# Patient Record
Sex: FEMALE | Race: WHITE | Employment: UNEMPLOYED | URBAN - METROPOLITAN AREA
[De-identification: names, ages, dates, MRNs, and addresses within clinical notes are randomized per-mention and may not be internally consistent; named-entity substitution may affect disease eponyms.]

---

## 2017-05-15 RX ORDER — INSULIN GLARGINE 100 [IU]/ML
INJECTION, SOLUTION SUBCUTANEOUS
Qty: 15 ML | Refills: 4 | Status: SHIPPED | OUTPATIENT
Start: 2017-05-15 | End: 2017-12-01 | Stop reason: SDUPTHER

## 2017-05-15 RX ORDER — CLOBETASOL PROPIONATE 0.5 MG/G
OINTMENT TOPICAL
Qty: 15 G | Refills: 0 | Status: SHIPPED | OUTPATIENT
Start: 2017-05-15 | End: 2017-06-16 | Stop reason: SDUPTHER

## 2017-06-02 ENCOUNTER — OFFICE VISIT (OUTPATIENT)
Dept: ENDOCRINOLOGY | Age: 47
End: 2017-06-02

## 2017-06-02 VITALS
RESPIRATION RATE: 18 BRPM | HEART RATE: 94 BPM | WEIGHT: 264.9 LBS | OXYGEN SATURATION: 96 % | SYSTOLIC BLOOD PRESSURE: 118 MMHG | HEIGHT: 64 IN | BODY MASS INDEX: 45.23 KG/M2 | TEMPERATURE: 98.8 F | DIASTOLIC BLOOD PRESSURE: 92 MMHG

## 2017-06-02 DIAGNOSIS — Z79.4 TYPE 2 DIABETES MELLITUS WITH HYPERGLYCEMIA, WITH LONG-TERM CURRENT USE OF INSULIN (HCC): Primary | ICD-10-CM

## 2017-06-02 DIAGNOSIS — E78.2 MIXED HYPERLIPIDEMIA: ICD-10-CM

## 2017-06-02 DIAGNOSIS — E66.01 MORBID OBESITY DUE TO EXCESS CALORIES (HCC): ICD-10-CM

## 2017-06-02 DIAGNOSIS — E11.65 TYPE 2 DIABETES MELLITUS WITH HYPERGLYCEMIA, WITH LONG-TERM CURRENT USE OF INSULIN (HCC): Primary | ICD-10-CM

## 2017-06-02 NOTE — PROGRESS NOTES
[facility]               Avis Arvizu MD        [address] 78 [phone] Fax [phone]           Patient Information  Date:6/2/2017  Name : Jeff Gil 48 y.o.     YOB: 1970         Referred by: Self referred        Chief Complaint   Patient presents with    Diabetes       History of Present Illness: Jeff Gil is a 48 y.o. female here for follow up     She is eating healthy now as she is fasting during Ramadan. Until then, her diet was not very healthy. No recent labs. She did not bring the log and she says she is very busy and has no time to check blood glucose. She is sleeping better. She is on Metformin and self stopped Lantus as she is fasting. Self stopped [facility]       She was diagnosed with type 2 diabetes several years ago  She is a hospice nurse by profession, knows about all complications and also the nutritional facts but it is just that she has difficulty following it. She has a family history of type 2 diabetes. Wt Readings from Last 3 Encounters:   06/02/17 264 lb 14.4 oz (120.2 kg)   12/21/16 264 lb (119.7 kg)   09/14/16 268 lb (121.6 kg)       BP Readings from Last 3 Encounters:   06/02/17 (!) 118/92   12/21/16 120/77   09/14/16 138/84           Past Medical History:   Diagnosis Date    Hyperlipidemia     Obesity     Type II or unspecified type diabetes mellitus without mention of complication, uncontrolled      Current Outpatient Prescriptions   Medication Sig    APPLE CIDER VINEGAR PO Take  by mouth two (2) times a day.  TURMERIC, BULK, by Does Not Apply route.  LANTUS SOLOSTAR 100 unit/mL (3 mL) pen INJECT 25 UNITS EVERY DAY AT 9PM AS DIRECTED    clobetasol (TEMOVATE) 0.05 % ointment APPLY TO AFFECTED AREA AS DIRECTED    hydrOXYzine HCl (ATARAX) 25 mg tablet TAKE 1 TABLET BY MOUTH EVERY EIGHT (8) HOURS AS NEEDED FOR ITCHING.     metFORMIN (GLUCOPHAGE) 1,000 mg tablet TAKE 1 TABLET BY MOUTH TWICE A DAY WITH MEALS    Blood-Glucose Meter (TRUE METRIX AIR GLUCOSE METER) monitoring kit Test blood glucose once daily Dx Code: E11.65    glucose blood VI test strips (TRUE METRIX GLUCOSE TEST STRIP) strip TEST BLOOD GLUCOSE ONCE DAILY DX CODE: E11.65    lancets (TRUEPLUS LANCETS) 33 gauge misc Test blood glucose once daily Dx Code: E11.65    lovastatin (MEVACOR) 20 mg tablet     Insulin Needles, Disposable, (ESVIN PEN NEEDLE) 32 gauge x 5/32\" ndle USE TO INJECT LANTUS    co-enzyme Q-10 (CO Q-10) 100 mg capsule Take 100 mg by mouth daily.  OTHER Take 4 Tabs by mouth daily. Bio Strath-     GREEN TEA LEAF EXTRACT (GREEN TEA PO) Take  by mouth daily.  GARLIC PO Take  by mouth daily.  cinnamon bark (CINNAMON) 500 mg cap Take  by mouth.  cholecalciferol, VITAMIN D3, (VITAMIN D3) 5,000 unit tab tablet Take  by mouth daily.  omega-3 fatty acids-vitamin e (FISH OIL) 1,000 mg cap Take 1 Cap by mouth. No current facility-administered medications for this visit. Allergies   Allergen Reactions    Other Medication Swelling     Antibiotics         Review of Systems:  - Constitutional Symptoms: no fevers, no chills   - Eyes: no blurry vision no double vision  - Cardiovascular: no chest pain ,no palpitations  - Respiratory: no cough no shortness of breath  - Integumentary: no rashes  - Neurological: no numbness, tingling, no  headaches  -     Physical Examination:   Blood pressure (!) 118/92, pulse 94, temperature 98.8 °F (37.1 °C), temperature source Oral, resp. rate 18, height 5' 4\" (1.626 m), weight 264 lb 14.4 oz (120.2 kg), SpO2 96 %. Estimated body mass index is 45.47 kg/(m^2) as calculated from the following:    Height as of this encounter: 5' 4\" (1.626 m). -   Weight as of this encounter: 264 lb 14.4 oz (120.2 kg).   - General: pleasant, no distress, good eye contact  - HEENT: no pallor, no periorbital edema, EOMI  - Neck: supple, no thyromegaly  - Cardiovascular: regular, normal rate, normal S1 and S2  - Respiratory: clear to auscultation bilaterally  - Gastrointestinal: soft, nontender, nondistended,  BS +  - Musculoskeletal: no edema, no foot ulcers  Diabetic feet exam ;     H/o partial or complete amputation of foot : No  H/o previous foot ulceration : No  H/o pre - ulcerative callus : No  H/o peripheral neuropathy and callus : No  H/o poor circulation  : No  Foot deformity : No  -   - Neurological:alert and oriented  - Psychiatric: normal mood and affect  - Skin: color, texture, turgor normal.       Data Reviewed:     [] Glucose records reviewed. [] See glucose records for details (to be scanned). [] A1C  [x] Reviewed labs      Lab Results   Component Value Date/Time    Hemoglobin A1c 10.3 08/31/2016 01:53 PM    Hemoglobin A1c 8.8 06/17/2015 04:00 PM    Glucose 350 08/31/2016 01:53 PM    Glucose  08/10/2015 01:03 PM    Microalb/Creat ratio (ug/mg creat.) 7.3 08/31/2016 01:53 PM    LDL, calculated 92 08/31/2016 01:53 PM    Creatinine 0.65 08/31/2016 01:53 PM      Lab Results   Component Value Date/Time    Cholesterol, total 177 08/31/2016 01:53 PM    HDL Cholesterol 56 08/31/2016 01:53 PM    LDL, calculated 92 08/31/2016 01:53 PM    Triglyceride 147 08/31/2016 01:53 PM     Lab Results   Component Value Date/Time    ALT (SGPT) 33 08/31/2016 01:53 PM    AST (SGOT) 61 08/31/2016 01:53 PM    Alk. phosphatase 85 08/31/2016 01:53 PM    Bilirubin, total <0.2 08/31/2016 01:53 PM     Lab Results   Component Value Date/Time    GFR est  08/31/2016 01:53 PM    GFR est non- 08/31/2016 01:53 PM    Creatinine 0.65 08/31/2016 01:53 PM    BUN 10 08/31/2016 01:53 PM    Sodium 134 08/31/2016 01:53 PM    Potassium 4.2 08/31/2016 01:53 PM    Chloride 95 08/31/2016 01:53 PM    CO2 25 08/31/2016 01:53 PM      Lab Results   Component Value Date/Time    TSH 2.460 08/31/2016 01:53 PM           Assessment/Plan:     1. Type 2 Diabetes Mellitus , uncontrolled.   Lab Results Component Value Date/Time    Hemoglobin A1c 10.3 08/31/2016 01:53 PM    Hemoglobin A1c (POC) 7.8 08/10/2015 01:05 PM    Need data  Lantus 26 units Continue metformin 1000 mg BID  Januvia 100 mg in AM - self stopped   Discussed the importance of checking home glucose regularly and taking all of their scheduled medications in order to have the best possible outcome. Reviewed the complications and importance of diet. Discussed about the diet, carbohydrate portion control and increase activity as tolerated. FLU annually ,Pneumovax ,aspirin daily,annual eye exam,microalbumin    2. Obesity:Body mass index is 45.47 kg/(m^2). BMI is out of normal parameters and plan is as follows: I have counseled this patient on diet and exercise regimens. 3. Hyperlipidemia: statin             There are no Patient Instructions on file for this visit. Follow-up Disposition: Not on File    Thank you for allowing me to participate in the care of this patient.     Rajan Lugo MD

## 2017-06-02 NOTE — PROGRESS NOTES
Nathanael Haynes is a 48 y.o. female here for   Chief Complaint   Patient presents with    Diabetes       Functional glucose monitor and record keeping system? - yes  Eye exam within last year? - yes Mar 2017  Foot exam within last year? - no    Lab Results   Component Value Date/Time    Hemoglobin A1c 10.3 08/31/2016 01:53 PM    Hemoglobin A1c (POC) 7.8 08/10/2015 01:05 PM       Wt Readings from Last 3 Encounters:   12/21/16 264 lb (119.7 kg)   09/14/16 268 lb (121.6 kg)   04/06/16 259 lb (117.5 kg)     Temp Readings from Last 3 Encounters:   12/21/16 97.7 °F (36.5 °C) (Oral)   09/14/16 98.3 °F (36.8 °C)   04/06/16 97.3 °F (36.3 °C) (Oral)     BP Readings from Last 3 Encounters:   12/21/16 120/77   09/14/16 138/84   04/06/16 127/74     Pulse Readings from Last 3 Encounters:   12/21/16 85   09/14/16 92   04/06/16 91

## 2017-06-02 NOTE — MR AVS SNAPSHOT
Visit Information Date & Time Provider Department Dept. Phone Encounter #  
 6/2/2017  3:45 PM Haja Gross MD [facility] Diabetes [facility] Endocr[facility]ology [phone] [id number] Follow-up Instructions Return in about 3 months (around 9/2/2017). Upcoming Health Maintenance Date Due  
 FOOT EXAM Q1 10/16/1978 EYE EXAM RETINAL OR DILATED Q1 10/16/1978 Pneumococcal 19-64 Medium Risk (1 of 1 - PPSV23) 10/16/1987 DTaP/Tdap/Td series (1 - Tdap) 10/16/1989 PAP AKA CERVICAL CYTOLOGY 10/16/1989 HEMOGLOBIN A1C Q6M 2/28/2017 INFLUENZA AGE 9 TO ADULT 8/1/2017 MICROALBUMIN Q1 8/31/2017 LIPID PANEL Q1 8/31/2017 Allergies as of 6/2/2017  Review Complete On: 6/2/2017 By: Haja Gross MD  
  
 Severity Noted Reaction Type Reactions Other Medication  12/08/2014    Swelling Antibiotics Current Immunizations  Never Reviewed No immunizations on file. Not reviewed this visit You Were Diagnosed With   
  
 Codes Comments Type 2 diabetes mellitus with hyperglycemia, with long-term current use of insulin (HCC)    -  Primary ICD-10-CM: E11.65, Z79.4 ICD-9-CM: 250.00, 790.29, V58.67 Mixed hyperlipidemia     ICD-10-CM: E78.2 ICD-9-CM: 272.2 Vitals BP Pulse Temp Resp Height(growth percentile) Weight(growth percentile) (!) 118/92 (BP 1 Location: Left arm, BP Patient Position: Sitting) 94 98.8 °F (37.1 °C) (Oral) 18 5' 4\" (1.626 m) 264 lb 14.4 oz (120.2 kg) SpO2 BMI OB Status Smoking Status 96% 45.47 kg/m2 Having regular periods Never Smoker Vitals History BMI and BSA Data Body Mass Index Body Surface Area 45.47 kg/m 2 2.33 m 2 Preferred Pharmacy Pharmacy Name Phone [id number], [address] [facility] [place] [phone] Your Updated Medication List  
  
   
This list is accurate as of: 6/2/17  4:41 PM.  Always use your most recent med list.  
  
  
  
  
 APPLE CIDER VINEGAR PO  
 Take  by mouth two (2) times a day. Blood-Glucose Meter monitoring kit Commonly known as:  TRUE METRIX AIR GLUCOSE METER Test blood glucose once daily Dx Code: E11.65  
  
 cholecalciferol (VITAMIN D3) 5,000 unit Tab tablet Commonly known as:  VITAMIN D3 Take  by mouth daily. CINNAMON 500 mg Cap Generic drug:  cinnamon bark Take  by mouth.  
  
 clobetasol 0.05 % ointment Commonly known as:  TEMOVATE  
APPLY TO AFFECTED AREA AS DIRECTED  
  
 CO Q-10 100 mg capsule Generic drug:  co-enzyme Q-10 Take 100 mg by mouth daily. FISH OIL 1,000 mg Cap Generic drug:  omega-3 fatty acids-vitamin e Take 1 Cap by mouth. GARLIC PO Take  by mouth daily. glucose blood VI test strips strip Commonly known as:  TRUE METRIX GLUCOSE TEST STRIP  
TEST BLOOD GLUCOSE ONCE DAILY DX CODE: E11.65 GREEN TEA PO Take  by mouth daily. hydrOXYzine HCl 25 mg tablet Commonly known as:  ATARAX TAKE 1 TABLET BY MOUTH EVERY EIGHT (8) HOURS AS NEEDED FOR ITCHING. Insulin Needles (Disposable) 32 gauge x 5/32\" Ndle Commonly known as:  Zenaida Pen Needle USE TO INJECT LANTUS  
  
 lancets 33 gauge Misc Commonly known as:  Pari Tang Test blood glucose once daily Dx Code: E11.65  
  
 LANTUS SOLOSTAR 100 unit/mL (3 mL) Inpn Generic drug:  insulin glargine INJECT 25 UNITS EVERY DAY AT 9PM AS DIRECTED  
  
 lovastatin 20 mg tablet Commonly known as:  MEVACOR  
  
 metFORMIN 1,000 mg tablet Commonly known as:  GLUCOPHAGE  
TAKE 1 TABLET BY MOUTH TWICE A DAY WITH MEALS  
  
 OTHER Take 4 Tabs by mouth daily. Bio Strath-  
  
 TURMERIC (BULK)  
by Does Not Apply route. Follow-up Instructions Return in about 3 months (around 9/2/2017). Introducing [facility]! Sonido Reece introduces [employer] patient portal. Now you can access parts of your medical record, email your doctor's office, and request medication refills online. 1. In your internet browser, go to [email or web address] 2. Click on the First Time User? Click Here link in the Sign In box. You will see the New Member Sign Up page. 3. Enter your [employer] Access Code exactly as it appears below. You will not need to use this code after youve completed the sign-up process. If you do not sign up before the expiration date, you must request a new code. · [employer] Access Code: [email or web address] Expires: 8/31/2017  4:41 PM 
 
4. Enter the last four digits of your Social Security Number (xxxx) and Date of Birth (mm/dd/yyyy) as indicated and click Submit. You will be taken to the next sign-up page. 5. Create a [employer] ID. This will be your [employer] login ID and cannot be changed, so think of one that is secure and easy to remember. 6. Create a [employer] password. You can change your password at any time. 7. Enter your Password Reset Question and Answer. This can be used at a later time if you forget your password. 8. Enter your e-mail address. You will receive e-mail notification when new information is available in [address] 9. Click Sign Up. You can now view and download portions of your medical record. 10. Click the Download Summary menu link to download a portable copy of your medical information. If you have questions, please visit the Frequently Asked Questions section of the [employer] website. Remember, [employer] is NOT to be used for urgent needs. For medical emergencies, dial 911. Now available from your iPhone and Android! Please provide this summary of care documentation to your next provider. Your primary care clinician is listed as Cozetta Barthel. If you have any questions after today's visit, please call [phone].

## 2017-06-15 ENCOUNTER — LAB ONLY (OUTPATIENT)
Dept: ENDOCRINOLOGY | Age: 47
End: 2017-06-15

## 2017-06-15 DIAGNOSIS — Z79.4 TYPE 2 DIABETES MELLITUS WITH HYPERGLYCEMIA, WITH LONG-TERM CURRENT USE OF INSULIN (HCC): ICD-10-CM

## 2017-06-15 DIAGNOSIS — E11.65 TYPE 2 DIABETES MELLITUS WITH HYPERGLYCEMIA, WITH LONG-TERM CURRENT USE OF INSULIN (HCC): ICD-10-CM

## 2017-06-15 DIAGNOSIS — E78.2 MIXED HYPERLIPIDEMIA: ICD-10-CM

## 2017-06-16 LAB
ALBUMIN SERPL-MCNC: 4.1 G/DL (ref 3.5–5.5)
ALBUMIN/CREAT UR: 11.7 MG/G CREAT (ref 0–30)
ALBUMIN/GLOB SERPL: 1.3 {RATIO} (ref 1.2–2.2)
ALP SERPL-CCNC: 70 IU/L (ref 39–117)
ALT SERPL-CCNC: 24 IU/L (ref 0–32)
AST SERPL-CCNC: 36 IU/L (ref 0–40)
BILIRUB SERPL-MCNC: 0.3 MG/DL (ref 0–1.2)
BUN SERPL-MCNC: 9 MG/DL (ref 6–24)
BUN/CREAT SERPL: 16 (ref 9–23)
CALCIUM SERPL-MCNC: 9.1 MG/DL (ref 8.7–10.2)
CHLORIDE SERPL-SCNC: 98 MMOL/L (ref 96–106)
CHOLEST SERPL-MCNC: 188 MG/DL (ref 100–199)
CO2 SERPL-SCNC: 22 MMOL/L (ref 18–29)
CREAT SERPL-MCNC: 0.55 MG/DL (ref 0.57–1)
CREAT UR-MCNC: 185.1 MG/DL
EST. AVERAGE GLUCOSE BLD GHB EST-MCNC: 237 MG/DL
GLOBULIN SER CALC-MCNC: 3.1 G/DL (ref 1.5–4.5)
GLUCOSE SERPL-MCNC: 214 MG/DL (ref 65–99)
HBA1C MFR BLD: 9.9 % (ref 4.8–5.6)
HDLC SERPL-MCNC: 60 MG/DL
INTERPRETATION, 910389: NORMAL
LDLC SERPL CALC-MCNC: 103 MG/DL (ref 0–99)
Lab: NORMAL
MICROALBUMIN UR-MCNC: 21.7 UG/ML
POTASSIUM SERPL-SCNC: 4.2 MMOL/L (ref 3.5–5.2)
PROT SERPL-MCNC: 7.2 G/DL (ref 6–8.5)
SODIUM SERPL-SCNC: 137 MMOL/L (ref 134–144)
TRIGL SERPL-MCNC: 126 MG/DL (ref 0–149)
VLDLC SERPL CALC-MCNC: 25 MG/DL (ref 5–40)

## 2017-06-19 RX ORDER — CLOBETASOL PROPIONATE 0.5 MG/G
OINTMENT TOPICAL
Qty: 15 G | Refills: 0 | Status: SHIPPED | OUTPATIENT
Start: 2017-06-19 | End: 2017-10-16 | Stop reason: SDUPTHER

## 2017-09-17 DIAGNOSIS — Z79.4 UNCONTROLLED TYPE 2 DIABETES MELLITUS WITH HYPERGLYCEMIA, WITH LONG-TERM CURRENT USE OF INSULIN (HCC): ICD-10-CM

## 2017-09-17 DIAGNOSIS — E11.65 UNCONTROLLED TYPE 2 DIABETES MELLITUS WITH HYPERGLYCEMIA, WITH LONG-TERM CURRENT USE OF INSULIN (HCC): ICD-10-CM

## 2017-09-17 RX ORDER — METFORMIN HYDROCHLORIDE 1000 MG/1
TABLET ORAL
Qty: 60 TAB | Refills: 8 | Status: SHIPPED | OUTPATIENT
Start: 2017-09-17 | End: 2017-12-01 | Stop reason: SDUPTHER

## 2017-10-16 DIAGNOSIS — E11.65 UNCONTROLLED TYPE 2 DIABETES MELLITUS WITH HYPERGLYCEMIA, WITH LONG-TERM CURRENT USE OF INSULIN (HCC): ICD-10-CM

## 2017-10-16 DIAGNOSIS — Z79.4 UNCONTROLLED TYPE 2 DIABETES MELLITUS WITH HYPERGLYCEMIA, WITH LONG-TERM CURRENT USE OF INSULIN (HCC): ICD-10-CM

## 2017-10-16 RX ORDER — METFORMIN HYDROCHLORIDE 1000 MG/1
TABLET ORAL
Qty: 60 TAB | Refills: 8 | Status: SHIPPED | OUTPATIENT
Start: 2017-10-16 | End: 2017-12-01 | Stop reason: SDUPTHER

## 2017-10-16 RX ORDER — CLOBETASOL PROPIONATE 0.5 MG/G
OINTMENT TOPICAL
Qty: 15 G | Refills: 0 | Status: SHIPPED | OUTPATIENT
Start: 2017-10-16 | End: 2017-12-01 | Stop reason: SDUPTHER

## 2017-12-01 ENCOUNTER — OFFICE VISIT (OUTPATIENT)
Dept: ENDOCRINOLOGY | Age: 47
End: 2017-12-01

## 2017-12-01 VITALS
HEART RATE: 85 BPM | DIASTOLIC BLOOD PRESSURE: 74 MMHG | OXYGEN SATURATION: 98 % | RESPIRATION RATE: 16 BRPM | BODY MASS INDEX: 43.5 KG/M2 | HEIGHT: 64 IN | SYSTOLIC BLOOD PRESSURE: 123 MMHG | TEMPERATURE: 98.5 F | WEIGHT: 254.8 LBS

## 2017-12-01 DIAGNOSIS — E66.01 MORBID OBESITY (HCC): ICD-10-CM

## 2017-12-01 DIAGNOSIS — E11.65 TYPE 2 DIABETES MELLITUS WITH HYPERGLYCEMIA, WITHOUT LONG-TERM CURRENT USE OF INSULIN (HCC): Primary | ICD-10-CM

## 2017-12-01 DIAGNOSIS — Z79.4 UNCONTROLLED TYPE 2 DIABETES MELLITUS WITH HYPERGLYCEMIA, WITH LONG-TERM CURRENT USE OF INSULIN (HCC): ICD-10-CM

## 2017-12-01 DIAGNOSIS — E11.65 UNCONTROLLED TYPE 2 DIABETES MELLITUS WITH HYPERGLYCEMIA, WITH LONG-TERM CURRENT USE OF INSULIN (HCC): ICD-10-CM

## 2017-12-01 DIAGNOSIS — E78.2 MIXED HYPERLIPIDEMIA: ICD-10-CM

## 2017-12-01 LAB
GLUCOSE POC: 280 MG/DL
HBA1C MFR BLD HPLC: 11.3 %

## 2017-12-01 RX ORDER — METFORMIN HYDROCHLORIDE 1000 MG/1
TABLET ORAL
Qty: 180 TAB | Refills: 3 | Status: SHIPPED | OUTPATIENT
Start: 2017-12-01 | End: 2018-10-19 | Stop reason: SDUPTHER

## 2017-12-01 RX ORDER — INSULIN GLARGINE 100 [IU]/ML
INJECTION, SOLUTION SUBCUTANEOUS
Qty: 30 ML | Refills: 3 | Status: SHIPPED | OUTPATIENT
Start: 2017-12-01 | End: 2017-12-20 | Stop reason: ALTCHOICE

## 2017-12-01 RX ORDER — HYDROXYZINE 25 MG/1
TABLET, FILM COATED ORAL
Qty: 90 TAB | Refills: 3 | Status: SHIPPED | OUTPATIENT
Start: 2017-12-01 | End: 2018-04-10 | Stop reason: SDUPTHER

## 2017-12-01 RX ORDER — CLOBETASOL PROPIONATE 0.5 MG/G
OINTMENT TOPICAL
Qty: 15 G | Refills: 0 | Status: SHIPPED | OUTPATIENT
Start: 2017-12-01 | End: 2018-01-14 | Stop reason: SDUPTHER

## 2017-12-01 NOTE — PROGRESS NOTES
João Huerta is a 50 y.o. female here for   Chief Complaint   Patient presents with    Diabetes       Functional glucose monitor and record keeping system? - yes  Eye exam within last year? - march 2017  Foot exam within last year? - last visit    1. Have you been to the ER, urgent care clinic since your last visit? Hospitalized since your last visit? -no    2. Have you seen or consulted any other health care providers outside of the [address] [place] [address] since your last visit? Include any pap smears or colon screening. -PCP      Lab Results   Component Value Date/Time    Hemoglobin A1c 9.9 06/15/2017 03:35 PM    Hemoglobin A1c (POC) 7.8 08/10/2015 01:05 PM       Wt Readings from Last 3 Encounters:   06/02/17 264 lb 14.4 oz (120.2 kg)   12/21/16 264 lb (119.7 kg)   09/14/16 268 lb (121.6 kg)     Temp Readings from Last 3 Encounters:   06/02/17 98.8 °F (37.1 °C) (Oral)   12/21/16 97.7 °F (36.5 °C) (Oral)   09/14/16 98.3 °F (36.8 °C)     BP Readings from Last 3 Encounters:   06/02/17 (!) 118/92   12/21/16 120/77   09/14/16 138/84     Pulse Readings from Last 3 Encounters:   06/02/17 94   12/21/16 85   09/14/16 92

## 2017-12-01 NOTE — MR AVS SNAPSHOT
Visit Information Date & Time Provider Department Dept. Phone Encounter #  
 12/1/2017  3:45 PM Rafael Loomis MD [facility] Diabetes [facility] Endocrinology [phone] [id number] Follow-up Instructions Return in about 4 months (around 4/1/2018). Upcoming Health Maintenance Date Due  
 FOOT EXAM Q1 10/16/1978 EYE EXAM RETINAL OR DILATED Q1 10/16/1978 Pneumococcal 19-64 Medium Risk (1 of 1 - PPSV23) 10/16/1987 DTaP/Tdap/Td series (1 - Tdap) 10/16/1989 PAP AKA CERVICAL CYTOLOGY 10/16/1989 Influenza Age 5 to Adult 8/1/2017 HEMOGLOBIN A1C Q6M 12/15/2017 MICROALBUMIN Q1 6/15/2018 LIPID PANEL Q1 6/15/2018 Allergies as of 12/1/2017  Review Complete On: 12/1/2017 By: Rafael Loomis MD  
  
 Severity Noted Reaction Type Reactions Other Medication  12/08/2014    Swelling Antibiotics Current Immunizations  Never Reviewed No immunizations on file. Not reviewed this visit You Were Diagnosed With   
  
 Codes Comments Type 2 diabetes mellitus with hyperglycemia, without long-term current use of insulin (HCC)    -  Primary ICD-10-CM: E11.65 ICD-9-CM: 250.00, 790.29 Mixed hyperlipidemia     ICD-10-CM: E78.2 ICD-9-CM: 272.2 Vitals BP Pulse Temp Resp Height(growth percentile) Weight(growth percentile) 123/74 (BP 1 Location: Left arm, BP Patient Position: Sitting) 85 98.5 °F (36.9 °C) (Oral) 16 5' 4\" (1.626 m) 254 lb 12.8 oz (115.6 kg) SpO2 BMI OB Status Smoking Status 98% 43.74 kg/m2 Having regular periods Never Smoker Vitals History BMI and BSA Data Body Mass Index Body Surface Area 43.74 kg/m 2 2.28 m 2 Preferred Pharmacy Pharmacy Name Phone [id number], [address] [phone] Your Updated Medication List  
  
   
This list is accurate as of: 12/1/17  4:31 PM.  Always use your most recent med list.  
  
  
  
  
 APPLE CIDER VINEGAR PO  
 Take  by mouth two (2) times a day. Blood-Glucose Meter monitoring kit Commonly known as:  TRUE METRIX AIR GLUCOSE METER Test blood glucose once daily Dx Code: E11.65  
  
 cholecalciferol (VITAMIN D3) 5,000 unit Tab tablet Commonly known as:  VITAMIN D3 Take  by mouth daily. CINNAMON 500 mg Cap Generic drug:  cinnamon bark Take  by mouth.  
  
 clobetasol 0.05 % ointment Commonly known as:  TEMOVATE  
APPLY TO THE AFFECTED AREAS AS DIRECTED  
  
 CO Q-10 100 mg capsule Generic drug:  co-enzyme Q-10 Take 100 mg by mouth daily. FISH OIL 1,000 mg Cap Generic drug:  omega-3 fatty acids-vitamin e Take 1 Cap by mouth. GARLIC PO Take  by mouth daily. glucose blood VI test strips strip Commonly known as:  TRUE METRIX GLUCOSE TEST STRIP  
TEST BLOOD GLUCOSE ONCE DAILY DX CODE: E11.65 GREEN TEA PO Take  by mouth daily. hydrOXYzine HCl 25 mg tablet Commonly known as:  ATARAX TAKE 1 TABLET BY MOUTH EVERY EIGHT (8) HOURS AS NEEDED FOR ITCHING. Insulin Needles (Disposable) 32 gauge x 5/32\" Ndle Commonly known as:  Zenaida Pen Needle USE TO INJECT LANTUS  
  
 lancets 33 gauge Misc Commonly known as:  Jakub Hutching Test blood glucose once daily Dx Code: E11.65  
  
 LANTUS SOLOSTAR 100 unit/mL (3 mL) Inpn Generic drug:  insulin glargine INJECT 25 UNITS EVERY DAY AT 9PM AS DIRECTED  
  
 lovastatin 20 mg tablet Commonly known as:  MEVACOR  
  
 metFORMIN 1,000 mg tablet Commonly known as:  GLUCOPHAGE  
TAKE 1 TABLET BY MOUTH TWICE A DAY WITH MEALS  
  
 OTHER Take 4 Tabs by mouth daily. Bio Strath-  
  
 TURMERIC (BULK)  
by Does Not Apply route. We Performed the Following AMB POC GLUCOSE, QUANTITATIVE, BLOOD [13593 CPT(R)] AMB POC HEMOGLOBIN A1C [39264 CPT(R)] Follow-up Instructions Return in about 4 months (around 4/1/2018). Patient Instructions Lantus 26 units at night Introducing [facility]! Jose Alfredo New introduces [facility] patient portal. Now you can access parts of your medical record, email your doctor's office, and request medication refills online. 1. In your internet browser, go to [email or web address] 2. Click on the First Time User? Click Here link in the Sign In box. You will see the New Member Sign Up page. 3. Enter your [facility] Access Code exactly as it appears below. You will not need to use this code after youve completed the sign-up process. If you do not sign up before the expiration date, you must request a new code. · [facility] Access Code: [email or web address] Expires: 3/1/2018  4:31 PM 
 
4. Enter the last four digits of your Social Security Number (xxxx) and Date of Birth (mm/dd/yyyy) as indicated and click Submit. You will be taken to the next sign-up page. 5. Create a [facility] ID. This will be your [facility] login ID and cannot be changed, so think of one that is secure and easy to remember. 6. Create a [facility] password. You can change your password at any time. 7. Enter your Password Reset Question and Answer. This can be used at a later time if you forget your password. 8. Enter your e-mail address. You will receive e-mail notification when new information is available in [address] 9. Click Sign Up. You can now view and download portions of your medical record. 10. Click the Download Summary menu link to download a portable copy of your medical information. If you have questions, please visit the Frequently Asked Questions section of the [facility] website. Remember, [facility] is NOT to be used for urgent needs. For medical emergencies, dial 911. Now available from your iPhone and Android! Please provide this summary of care documentation to your next provider. Your primary care clinician is listed as Elizabeth Dunaway. If you have any questions after today's visit, please call [phone].

## 2017-12-01 NOTE — PROGRESS NOTES
[facility]               Nidia Joseph MD        [address] 78 [phone] Fax [phone]           Patient Information  Date:12/1/2017  Name : Krista Up 50 y.o.     YOB: 1970         Referred by: Self referred        Chief Complaint   Patient presents with    Diabetes       History of Present Illness: Krista Up is a 50 y.o. female here for follow up      She did not bring the log ,not checking blood glucose. She is sleeping better. She is on Metformin and self stopped Lantus as she was feeling better . A1C is high and BG here was > 250             Self stopped [place] and [place]       She was diagnosed with type 2 diabetes several years ago  She is a hospice nurse by profession, knows about all complications and also the nutritional facts but it is just that she has difficulty following it. She has a family history of type 2 diabetes. Wt Readings from Last 3 Encounters:   12/01/17 254 lb 12.8 oz (115.6 kg)   06/02/17 264 lb 14.4 oz (120.2 kg)   12/21/16 264 lb (119.7 kg)       BP Readings from Last 3 Encounters:   12/01/17 123/74   06/02/17 (!) 118/92   12/21/16 120/77           Past Medical History:   Diagnosis Date    Hyperlipidemia     Obesity     Type II or unspecified type diabetes mellitus without mention of complication, uncontrolled      Current Outpatient Prescriptions   Medication Sig    clobetasol (TEMOVATE) 0.05 % ointment APPLY TO THE AFFECTED AREAS AS DIRECTED    metFORMIN (GLUCOPHAGE) 1,000 mg tablet TAKE 1 TABLET BY MOUTH TWICE A DAY WITH MEALS    APPLE CIDER VINEGAR PO Take  by mouth two (2) times a day.  TURMERIC, BULK, by Does Not Apply route.  hydrOXYzine HCl (ATARAX) 25 mg tablet TAKE 1 TABLET BY MOUTH EVERY EIGHT (8) HOURS AS NEEDED FOR ITCHING.     Blood-Glucose Meter (TRUE METRIX AIR GLUCOSE METER) monitoring kit Test blood glucose once daily Dx Code: E11.65    glucose blood VI test strips (TRUE METRIX GLUCOSE TEST STRIP) strip TEST BLOOD GLUCOSE ONCE DAILY DX CODE: E11.65    lancets (TRUEPLUS LANCETS) 33 gauge misc Test blood glucose once daily Dx Code: E11.65    Insulin Needles, Disposable, (ESVIN PEN NEEDLE) 32 gauge x 5/32\" ndle USE TO INJECT LANTUS    co-enzyme Q-10 (CO Q-10) 100 mg capsule Take 100 mg by mouth daily.  OTHER Take 4 Tabs by mouth daily. Bio Strath-     GREEN TEA LEAF EXTRACT (GREEN TEA PO) Take  by mouth daily.  GARLIC PO Take  by mouth daily.  cinnamon bark (CINNAMON) 500 mg cap Take  by mouth.  cholecalciferol, VITAMIN D3, (VITAMIN D3) 5,000 unit tab tablet Take  by mouth daily.  omega-3 fatty acids-vitamin e (FISH OIL) 1,000 mg cap Take 1 Cap by mouth.  LANTUS SOLOSTAR 100 unit/mL (3 mL) pen INJECT 25 UNITS EVERY DAY AT 9PM AS DIRECTED    lovastatin (MEVACOR) 20 mg tablet      No current facility-administered medications for this visit. Allergies   Allergen Reactions    Other Medication Swelling     Antibiotics         Review of Systems:  - Constitutional Symptoms: no fevers, no chills   - Eyes: no blurry vision no double vision  - Cardiovascular: no chest pain ,no palpitations  - Respiratory: no cough no shortness of breath  - Integumentary: no rashes  - Neurological: no numbness, tingling, no  headaches  -     Physical Examination:   Blood pressure 123/74, pulse 85, temperature 98.5 °F (36.9 °C), temperature source Oral, resp. rate 16, height 5' 4\" (1.626 m), weight 254 lb 12.8 oz (115.6 kg), SpO2 98 %. Estimated body mass index is 43.74 kg/(m^2) as calculated from the following:    Height as of this encounter: 5' 4\" (1.626 m). -   Weight as of this encounter: 254 lb 12.8 oz (115.6 kg).   - General: pleasant, no distress, good eye contact  - HEENT: no pallor, no periorbital edema, EOMI  - Neck: supple, no thyromegaly  - Cardiovascular: regular, normal rate, normal S1 and S2  - Respiratory: clear to auscultation bilaterally  - Gastrointestinal: soft, nontender, nondistended,  BS +  - Musculoskeletal: no edema, no foot ulcers  Diabetic feet exam ;     H/o partial or complete amputation of foot : No  H/o previous foot ulceration : No  H/o pre - ulcerative callus : No  H/o peripheral neuropathy and callus : No  H/o poor circulation  : No  Foot deformity : No  -   - Neurological:alert and oriented  - Psychiatric: normal mood and affect  - Skin: color, texture, turgor normal.       Data Reviewed:     [] Glucose records reviewed. [] See glucose records for details (to be scanned). [] A1C  [x] Reviewed labs      Lab Results   Component Value Date/Time    Hemoglobin A1c 9.9 06/15/2017 03:35 PM    Hemoglobin A1c 10.3 08/31/2016 01:53 PM    Hemoglobin A1c 8.8 06/17/2015 04:00 PM    Glucose 214 06/15/2017 03:35 PM    Glucose  12/01/2017 04:01 PM    Microalb/Creat ratio (ug/mg creat.) 11.7 06/15/2017 03:35 PM    LDL, calculated 103 06/15/2017 03:35 PM    Creatinine 0.55 06/15/2017 03:35 PM      Lab Results   Component Value Date/Time    Cholesterol, total 188 06/15/2017 03:35 PM    HDL Cholesterol 60 06/15/2017 03:35 PM    LDL, calculated 103 06/15/2017 03:35 PM    Triglyceride 126 06/15/2017 03:35 PM     Lab Results   Component Value Date/Time    ALT (SGPT) 24 06/15/2017 03:35 PM    AST (SGOT) 36 06/15/2017 03:35 PM    Alk. phosphatase 70 06/15/2017 03:35 PM    Bilirubin, total 0.3 06/15/2017 03:35 PM     Lab Results   Component Value Date/Time    GFR est  06/15/2017 03:35 PM    GFR est non- 06/15/2017 03:35 PM    Creatinine 0.55 06/15/2017 03:35 PM    BUN 9 06/15/2017 03:35 PM    Sodium 137 06/15/2017 03:35 PM    Potassium 4.2 06/15/2017 03:35 PM    Chloride 98 06/15/2017 03:35 PM    CO2 22 06/15/2017 03:35 PM      Lab Results   Component Value Date/Time    TSH 2.460 08/31/2016 01:53 PM           Assessment/Plan:     1. Type 2 Diabetes Mellitus , uncontrolled.   Lab Results   Component Value Date/Time    Hemoglobin A1c 9.9 06/15/2017 03:35 PM Hemoglobin A1c (POC) 11.3 12/01/2017 04:01 PM    Need data  Lantus 26 units Continue metformin 1000 mg BID  Januvia 100 mg in AM - self stopped   Discussed the importance of checking home glucose regularly and taking all of their scheduled medications in order to have the best possible outcome. Reviewed the complications and importance of diet. Discussed about the diet, carbohydrate portion control and increase activity as tolerated. FLU annually ,Pneumovax ,aspirin daily,annual eye exam,microalbumin    2. Obesity:Body mass index is 43.74 kg/(m^2). BMI is out of normal parameters and plan is as follows: I have counseled this patient on diet and exercise regimens. 3. Hyperlipidemia: statin             There are no Patient Instructions on file for this visit. Follow-up Disposition: Not on File    Thank you for allowing me to participate in the care of this patient.     Rhonda Jo MD

## 2017-12-02 PROBLEM — E66.01 MORBID OBESITY (HCC): Status: ACTIVE | Noted: 2017-12-02

## 2017-12-20 RX ORDER — INSULIN GLARGINE 100 [IU]/ML
INJECTION, SOLUTION SUBCUTANEOUS
Qty: 15 ML | Refills: 4 | Status: SHIPPED | OUTPATIENT
Start: 2017-12-20 | End: 2018-06-12 | Stop reason: SDUPTHER

## 2018-01-14 DIAGNOSIS — Z79.4 UNCONTROLLED TYPE 2 DIABETES MELLITUS WITH HYPERGLYCEMIA, WITH LONG-TERM CURRENT USE OF INSULIN (HCC): ICD-10-CM

## 2018-01-14 DIAGNOSIS — E11.65 UNCONTROLLED TYPE 2 DIABETES MELLITUS WITH HYPERGLYCEMIA, WITH LONG-TERM CURRENT USE OF INSULIN (HCC): ICD-10-CM

## 2018-01-14 RX ORDER — CLOBETASOL PROPIONATE 0.5 MG/G
OINTMENT TOPICAL
Qty: 15 G | Refills: 0 | Status: SHIPPED | OUTPATIENT
Start: 2018-01-14 | End: 2020-05-13 | Stop reason: SDUPTHER

## 2018-04-06 ENCOUNTER — TELEPHONE (OUTPATIENT)
Dept: ENDOCRINOLOGY | Age: 48
End: 2018-04-06

## 2018-04-06 DIAGNOSIS — E11.65 TYPE 2 DIABETES MELLITUS WITH HYPERGLYCEMIA, WITHOUT LONG-TERM CURRENT USE OF INSULIN (HCC): Primary | ICD-10-CM

## 2018-04-06 NOTE — TELEPHONE ENCOUNTER
Pt wants to check for UTI    Order placed for pt per verbal order with read back from Dr. Isabelle Parra 04/06/18

## 2018-04-07 LAB
ALBUMIN SERPL-MCNC: 4 G/DL (ref 3.5–5.5)
ALBUMIN/CREAT UR: 28.8 MG/G CREAT (ref 0–30)
ALBUMIN/GLOB SERPL: 1.3 {RATIO} (ref 1.2–2.2)
ALP SERPL-CCNC: 79 IU/L (ref 39–117)
ALT SERPL-CCNC: 12 IU/L (ref 0–32)
APPEARANCE UR: CLEAR
AST SERPL-CCNC: 21 IU/L (ref 0–40)
BACTERIA #/AREA URNS HPF: NORMAL /[HPF]
BILIRUB SERPL-MCNC: 0.2 MG/DL (ref 0–1.2)
BILIRUB UR QL STRIP: NEGATIVE
BUN SERPL-MCNC: 10 MG/DL (ref 6–24)
BUN/CREAT SERPL: 16 (ref 9–23)
CALCIUM SERPL-MCNC: 9.4 MG/DL (ref 8.7–10.2)
CASTS URNS QL MICRO: NORMAL /LPF
CHLORIDE SERPL-SCNC: 94 MMOL/L (ref 96–106)
CHOLEST SERPL-MCNC: 217 MG/DL (ref 100–199)
CO2 SERPL-SCNC: 24 MMOL/L (ref 18–29)
COLOR UR: YELLOW
CREAT SERPL-MCNC: 0.62 MG/DL (ref 0.57–1)
CREAT UR-MCNC: 39.2 MG/DL
EPI CELLS #/AREA URNS HPF: NORMAL /HPF
EST. AVERAGE GLUCOSE BLD GHB EST-MCNC: 321 MG/DL
GFR SERPLBLD CREATININE-BSD FMLA CKD-EPI: 106 ML/MIN/1.73
GFR SERPLBLD CREATININE-BSD FMLA CKD-EPI: 122 ML/MIN/1.73
GLOBULIN SER CALC-MCNC: 3 G/DL (ref 1.5–4.5)
GLUCOSE SERPL-MCNC: 337 MG/DL (ref 65–99)
GLUCOSE UR QL: ABNORMAL
HBA1C MFR BLD: 12.8 % (ref 4.8–5.6)
HDLC SERPL-MCNC: 68 MG/DL
HGB UR QL STRIP: NEGATIVE
INTERPRETATION, 910389: NORMAL
KETONES UR QL STRIP: ABNORMAL
LDLC SERPL CALC-MCNC: 109 MG/DL (ref 0–99)
LEUKOCYTE ESTERASE UR QL STRIP: NEGATIVE
Lab: NORMAL
MICRO URNS: ABNORMAL
MICRO URNS: ABNORMAL
MICROALBUMIN UR-MCNC: 11.3 UG/ML
NITRITE UR QL STRIP: NEGATIVE
PH UR STRIP: 6 [PH] (ref 5–7.5)
POTASSIUM SERPL-SCNC: 4.3 MMOL/L (ref 3.5–5.2)
PROT SERPL-MCNC: 7 G/DL (ref 6–8.5)
PROT UR QL STRIP: NEGATIVE
RBC #/AREA URNS HPF: NORMAL /HPF
SODIUM SERPL-SCNC: 133 MMOL/L (ref 134–144)
SP GR UR: >=1.03 (ref 1–1.03)
TRIGL SERPL-MCNC: 198 MG/DL (ref 0–149)
URINALYSIS REFLEX , 377201: ABNORMAL
UROBILINOGEN UR STRIP-MCNC: 0.2 MG/DL (ref 0.2–1)
VLDLC SERPL CALC-MCNC: 40 MG/DL (ref 5–40)
WBC #/AREA URNS HPF: NORMAL /HPF

## 2018-04-10 DIAGNOSIS — E11.65 UNCONTROLLED TYPE 2 DIABETES MELLITUS WITH HYPERGLYCEMIA, WITH LONG-TERM CURRENT USE OF INSULIN (HCC): ICD-10-CM

## 2018-04-10 DIAGNOSIS — Z79.4 UNCONTROLLED TYPE 2 DIABETES MELLITUS WITH HYPERGLYCEMIA, WITH LONG-TERM CURRENT USE OF INSULIN (HCC): ICD-10-CM

## 2018-04-10 RX ORDER — HYDROXYZINE 25 MG/1
TABLET, FILM COATED ORAL
Qty: 90 TAB | Refills: 3 | Status: SHIPPED | OUTPATIENT
Start: 2018-04-10 | End: 2018-05-11 | Stop reason: SDUPTHER

## 2018-04-18 DIAGNOSIS — Z79.4 UNCONTROLLED TYPE 2 DIABETES MELLITUS WITH HYPERGLYCEMIA, WITH LONG-TERM CURRENT USE OF INSULIN (HCC): ICD-10-CM

## 2018-04-18 DIAGNOSIS — E11.65 UNCONTROLLED TYPE 2 DIABETES MELLITUS WITH HYPERGLYCEMIA, WITH LONG-TERM CURRENT USE OF INSULIN (HCC): ICD-10-CM

## 2018-04-18 RX ORDER — HYDROXYZINE 25 MG/1
TABLET, FILM COATED ORAL
Qty: 90 TAB | Refills: 3 | Status: SHIPPED | OUTPATIENT
Start: 2018-04-18 | End: 2018-05-11 | Stop reason: SDUPTHER

## 2018-05-11 ENCOUNTER — OFFICE VISIT (OUTPATIENT)
Dept: ENDOCRINOLOGY | Age: 48
End: 2018-05-11

## 2018-05-11 VITALS
BODY MASS INDEX: 38.82 KG/M2 | DIASTOLIC BLOOD PRESSURE: 84 MMHG | OXYGEN SATURATION: 97 % | HEART RATE: 83 BPM | TEMPERATURE: 97.9 F | HEIGHT: 64 IN | WEIGHT: 227.4 LBS | RESPIRATION RATE: 14 BRPM | SYSTOLIC BLOOD PRESSURE: 131 MMHG

## 2018-05-11 DIAGNOSIS — E11.65 UNCONTROLLED TYPE 2 DIABETES MELLITUS WITH HYPERGLYCEMIA, UNSPECIFIED WHETHER LONG TERM INSULIN USE: ICD-10-CM

## 2018-05-11 DIAGNOSIS — E78.2 MIXED HYPERLIPIDEMIA: ICD-10-CM

## 2018-05-11 DIAGNOSIS — E11.65 UNCONTROLLED TYPE 2 DIABETES MELLITUS WITH HYPERGLYCEMIA, WITH LONG-TERM CURRENT USE OF INSULIN (HCC): ICD-10-CM

## 2018-05-11 DIAGNOSIS — Z79.4 UNCONTROLLED TYPE 2 DIABETES MELLITUS WITH HYPERGLYCEMIA, WITH LONG-TERM CURRENT USE OF INSULIN (HCC): ICD-10-CM

## 2018-05-11 DIAGNOSIS — Z79.4 TYPE 2 DIABETES MELLITUS WITH HYPERGLYCEMIA, WITH LONG-TERM CURRENT USE OF INSULIN (HCC): Primary | ICD-10-CM

## 2018-05-11 DIAGNOSIS — E66.01 MORBID OBESITY (HCC): ICD-10-CM

## 2018-05-11 DIAGNOSIS — E11.65 TYPE 2 DIABETES MELLITUS WITH HYPERGLYCEMIA, WITH LONG-TERM CURRENT USE OF INSULIN (HCC): Primary | ICD-10-CM

## 2018-05-11 RX ORDER — HYDROXYZINE 25 MG/1
TABLET, FILM COATED ORAL
Qty: 90 TAB | Refills: 3 | Status: SHIPPED | OUTPATIENT
Start: 2018-05-11 | End: 2018-10-19 | Stop reason: SDUPTHER

## 2018-05-11 RX ORDER — INSULIN PUMP SYRINGE, 3 ML
EACH MISCELLANEOUS
Qty: 1 KIT | Refills: 0 | Status: SHIPPED | OUTPATIENT
Start: 2018-05-11

## 2018-05-11 RX ORDER — LANCETS 33 GAUGE
EACH MISCELLANEOUS
Qty: 1 PACKAGE | Refills: 11 | Status: SHIPPED | OUTPATIENT
Start: 2018-05-11 | End: 2019-02-11 | Stop reason: SDUPTHER

## 2018-05-11 NOTE — PROGRESS NOTES
Mahendra Cid is a 50 y.o. female here for   Chief Complaint   Patient presents with    Diabetes    Cholesterol Problem       Functional glucose monitor and record keeping system? -   Eye exam within last year? -   Foot exam within last year? - due    1. Have you been to the ER, urgent care clinic since your last visit? Hospitalized since your last visit? -    2. Have you seen or consulted any other health care providers outside of the [address] [place] [address] since your last visit?   Include any pap smears or colon screening.-      Lab Results   Component Value Date/Time    Hemoglobin A1c 12.8 (H) 04/06/2018 01:36 PM    Hemoglobin A1c (POC) 11.3 12/01/2017 04:01 PM       Wt Readings from Last 3 Encounters:   12/01/17 254 lb 12.8 oz (115.6 kg)   06/02/17 264 lb 14.4 oz (120.2 kg)   12/21/16 264 lb (119.7 kg)     Temp Readings from Last 3 Encounters:   12/01/17 98.5 °F (36.9 °C) (Oral)   06/02/17 98.8 °F (37.1 °C) (Oral)   12/21/16 97.7 °F (36.5 °C) (Oral)     BP Readings from Last 3 Encounters:   12/01/17 123/74   06/02/17 (!) 118/92   12/21/16 120/77     Pulse Readings from Last 3 Encounters:   12/01/17 85   06/02/17 94   12/21/16 85 Statement Selected

## 2018-05-11 NOTE — LETTER
5/13/2018 3:10 PM 
 
Patient:  Roberta Nathan YOB: 1970 Date of Visit: 5/11/2018 Dear Ignacia Hudson MD 
[place] [facility] [address] VIA Facsimile: [phone] 
 : Thank you for referring Ms. Roberta Nathan to me for evaluation/treatment. Below are the relevant portions of my assessment and plan of care. If you have questions, please do not hesitate to call me. I look forward to following Ms. Romaine Waite along with you. Sincerely, Erika Pettit MD

## 2018-05-11 NOTE — MR AVS SNAPSHOT
[address] [address] 
[phone] Patient: Susan Chatterjee MRN: [id number] :1970 Visit Information Date & Time Provider Department Dept. Phone Encounter #  
 2018 10:15 AM Vicente Amaya MD [facility] [phone] [id number] Follow-up Instructions Return in about 4 months (around 2018). Upcoming Health Maintenance Date Due  
 FOOT EXAM Q1 10/16/1978 EYE EXAM RETINAL OR DILATED Q1 10/16/1978 Pneumococcal 19-64 Medium Risk (1 of 1 - PPSV23) 10/16/1987 DTaP/Tdap/Td series (1 - Tdap) 10/16/1989 PAP AKA CERVICAL CYTOLOGY 10/16/1989 Influenza Age 5 to Adult 2018 HEMOGLOBIN A1C Q6M 10/6/2018 MICROALBUMIN Q1 2019 LIPID PANEL Q1 2019 Allergies as of 2018  Review Complete On: 2018 By: Vicente Amaya MD  
  
 Severity Noted Reaction Type Reactions Other Medication  2014    Swelling Antibiotics Current Immunizations  Never Reviewed No immunizations on file. Not reviewed this visit You Were Diagnosed With   
  
 Codes Comments Type 2 diabetes mellitus with hyperglycemia, with long-term current use of insulin (HCC)    -  Primary ICD-10-CM: E11.65, Z79.4 ICD-9-CM: 250.00, 790.29, V58.67 Morbid obesity ([address]     ICD-10-CM: E66.01 
ICD-9-CM: 278.01 Mixed hyperlipidemia     ICD-10-CM: E78.2 ICD-9-CM: 272.2 Vitals BP Pulse Temp Resp Height(growth percentile) Weight(growth percentile) 131/84 (BP 1 Location: Left arm, BP Patient Position: Sitting) 83 97.9 °F (36.6 °C) (Oral) 14 5' 4\" (1.626 m) 227 lb 6.4 oz (103.1 kg) SpO2 BMI OB Status Smoking Status 97% 39.03 kg/m2 Having regular periods Never Smoker Vitals History BMI and BSA Data Body Mass Index Body Surface Area 39.03 kg/m 2 2.16 m 2 Preferred Pharmacy Pharmacy Name Phone [id number], [address] [facility] [phone] Your Updated Medication List  
  
   
This list is accurate as of 5/11/18 10:21 AM.  Always use your most recent med list.  
  
  
  
  
 APPLE CIDER VINEGAR PO Take  by mouth two (2) times a day. BASAGLAR KWIKPEN U-100 INSULIN 100 unit/mL (3 mL) Inpn Generic drug:  insulin glargine INJECT 25 UNITS EVERY DAY AT 9PM AS DIRECTED Blood-Glucose Meter monitoring kit Commonly known as:  TRUE METRIX AIR GLUCOSE METER Test blood glucose once daily Dx Code: E11.65  
  
 cholecalciferol (VITAMIN D3) 5,000 unit Tab tablet Commonly known as:  VITAMIN D3 Take  by mouth daily. CINNAMON 500 mg Cap Generic drug:  cinnamon bark Take  by mouth.  
  
 clobetasol 0.05 % ointment Commonly known as:  TEMOVATE  
APPLY TO THE AFFECTED AREAS AS DIRECTED  
  
 CO Q-10 100 mg capsule Generic drug:  co-enzyme Q-10 Take 100 mg by mouth daily. FISH OIL 1,000 mg Cap Generic drug:  omega-3 fatty acids-vitamin e Take 1 Cap by mouth. GARLIC PO Take  by mouth daily. GREEN TEA PO Take  by mouth daily. hydrOXYzine HCl 25 mg tablet Commonly known as:  ATARAX TAKE 1 TABLET BY MOUTH EVERY EIGHT (8) HOURS AS NEEDED FOR ITCHING. Insulin Needles (Disposable) 32 gauge x 5/32\" Ndle Commonly known as:  [place] Pen Needle USE TO INJECT LANTUS  
  
 lancets 33 gauge Misc Commonly known as:  Rashard Meiers Test blood glucose once daily Dx Code: E11.65  
  
 lovastatin 20 mg tablet Commonly known as:  MEVACOR  
  
 metFORMIN 1,000 mg tablet Commonly known as:  GLUCOPHAGE  
TAKE 1 TABLET BY MOUTH TWICE A DAY WITH MEALS  
  
 OTHER Take 4 Tabs by mouth daily. Bio Strath-  
  
 TRUE METRIX GLUCOSE TEST STRIP strip Generic drug:  glucose blood VI test strips TEST BLOOD GLUCOSE ONCE DAILY TURMERIC (BULK)  
by Does Not Apply route. Follow-up Instructions Return in about 4 months (around 9/11/2018). Patient Instructions Please remember to bring your meter and/or log to every visit. Also, be sure to have a dilated diabetic eye exam done annually. Refills -Please call your pharmacy and have them send us a refill request. 
Results - Allow up to a week for lab results to be processed and reviewed. Phone calls - Allow up to 24 hours for non-urgent calls to be returned. Prior Authorization - May take up to 2 weeks to process depending on your insurance. Forms - FMLA, DMV, Patient Assistance, etc. will take up to 2 weeks to process. Cancellations - Please notify the office in advance if you cannot keep your appointment. Samples - Will only be dispensed at visits as supplies are limited. If you are having a medical emergency, call 911. Introducing [facility] SERVICES! Nancy Strauss introduces [employer] patient portal. Now you can access parts of your medical record, email your doctor's office, and request medication refills online. 1. In your internet browser, go to [email or web address] 2. Click on the First Time User? Click Here link in the Sign In box. You will see the New Member Sign Up page. 3. Enter your [employer] Access Code exactly as it appears below. You will not need to use this code after youve completed the sign-up process. If you do not sign up before the expiration date, you must request a new code. · [employer] Access Code: [email or web address] Expires: 8/9/2018 10:21 AM 
 
4. Enter the last four digits of your Social Security Number (xxxx) and Date of Birth (mm/dd/yyyy) as indicated and click Submit. You will be taken to the next sign-up page. 5. Create a [employer] ID. This will be your [employer] login ID and cannot be changed, so think of one that is secure and easy to remember. 6. Create a [facility] password. You can change your password at any time. 7. Enter your Password Reset Question and Answer. This can be used at a later time if you forget your password. 8. Enter your e-mail address. You will receive e-mail notification when new information is available in [address] 9. Click Sign Up. You can now view and download portions of your medical record. 10. Click the Download Summary menu link to download a portable copy of your medical information. If you have questions, please visit the Frequently Asked Questions section of the [employer] website. Remember, [employer] is NOT to be used for urgent needs. For medical emergencies, dial 911. Now available from your iPhone and Android! Please provide this summary of care documentation to your next provider. Your primary care clinician is listed as Leatrice Cushing. If you have any questions after today's visit, please call [phone].

## 2018-05-11 NOTE — PROGRESS NOTES
[facility]               Dony Quan MD        [address] 78 [phone] Fax [phone]           Patient Information  Date:5/11/2018  Name : Khalida Michelle 50 y.o.     YOB: 1970         Referred by: Self referred        Chief Complaint   Patient presents with    Diabetes    Cholesterol Problem       History of Present Illness: Khalida Michelle is a 50 y.o. female here for follow up    She has not been checking the blood glucose . she is sleeping better. She is on Metformin and self stopped Lantus as she was feeling better . She was asked to resume insulin during last visit which she has not done. She has lost weight which could be due to uncontrolled diabetes mellitus. She has cut down the starches. 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 She has a family history of type 2 diabetes. Wt Readings from Last 3 Encounters:   05/11/18 227 lb 6.4 oz (103.1 kg)   12/01/17 254 lb 12.8 oz (115.6 kg)   06/02/17 264 lb 14.4 oz (120.2 kg)       BP Readings from Last 3 Encounters:   05/11/18 131/84   12/01/17 123/74   06/02/17 (!) 118/92           Past Medical History:   Diagnosis Date    Hyperlipidemia     Obesity     Type II or unspecified type diabetes mellitus without mention of complication, uncontrolled      Current Outpatient Prescriptions   Medication Sig    hydrOXYzine HCl (ATARAX) 25 mg tablet TAKE 1 TABLET BY MOUTH EVERY EIGHT (8) HOURS AS NEEDED FOR ITCHING.     TRUE METRIX GLUCOSE TEST STRIP strip TEST BLOOD GLUCOSE ONCE DAILY    clobetasol (TEMOVATE) 0.05 % ointment APPLY TO THE AFFECTED AREAS AS DIRECTED    BASAGLAR KWIKPEN 100 unit/mL (3 mL) inpn INJECT 25 UNITS EVERY DAY AT 9PM AS DIRECTED    metFORMIN (GLUCOPHAGE) 1,000 mg tablet TAKE 1 TABLET BY MOUTH TWICE A DAY WITH MEALS    APPLE CIDER VINEGAR PO Take  by mouth two (2) times a day.  TURMERIC, BULK, by Does Not Apply route.  Blood-Glucose Meter (TRUE METRIX AIR GLUCOSE METER) monitoring kit Test blood glucose once daily Dx Code: E11.65    lancets (TRUEPLUS LANCETS) 33 gauge misc Test blood glucose once daily Dx Code: E11.65    Insulin Needles, Disposable, (ESVIN PEN NEEDLE) 32 gauge x 5/32\" ndle USE TO INJECT LANTUS    co-enzyme Q-10 (CO Q-10) 100 mg capsule Take 100 mg by mouth daily.  OTHER Take 4 Tabs by mouth daily. Bio Strath-     GREEN TEA LEAF EXTRACT (GREEN TEA PO) Take  by mouth daily.  GARLIC PO Take  by mouth daily.  cinnamon bark (CINNAMON) 500 mg cap Take  by mouth.  cholecalciferol, VITAMIN D3, (VITAMIN D3) 5,000 unit tab tablet Take  by mouth daily.  omega-3 fatty acids-vitamin e (FISH OIL) 1,000 mg cap Take 1 Cap by mouth.  lovastatin (MEVACOR) 20 mg tablet      No current facility-administered medications for this visit. Allergies   Allergen Reactions    Other Medication Swelling     Antibiotics         Review of Systems:  - Constitutional Symptoms: no fevers, no chills   - Eyes: no blurry vision no double vision  - Cardiovascular: no chest pain ,no palpitations  - Respiratory: no cough no shortness of breath  - Integumentary: no rashes  - Neurological: no numbness, tingling, no  headaches  -     Physical Examination:   Blood pressure 131/84, pulse 83, temperature 97.9 °F (36.6 °C), temperature source Oral, resp. rate 14, height 5' 4\" (1.626 m), weight 227 lb 6.4 oz (103.1 kg), SpO2 97 %. Estimated body mass index is 39.03 kg/(m^2) as calculated from the following:    Height as of this encounter: 5' 4\" (1.626 m). -   Weight as of this encounter: 227 lb 6.4 oz (103.1 kg).   - General: pleasant, no distress, good eye contact  - HEENT: no pallor, no periorbital edema, EOMI  - Neck: supple, no thyromegaly  - Cardiovascular: regular, normal rate, normal S1 and S2  - Respiratory: clear to auscultation bilaterally  - Gastrointestinal: soft, nontender, nondistended,  BS +  - Musculoskeletal: no edema, no foot ulcers  Diabetic feet exam ; May 2018    H/o partial or complete amputation of foot : No  H/o previous foot ulceration : No  H/o pre - ulcerative callus : No  H/o peripheral neuropathy and callus : No  H/o poor circulation  : No  Foot deformity : No  -   - Neurological:alert and oriented  - Psychiatric: normal mood and affect  - Skin: color, texture, turgor normal.       Data Reviewed:     [] Glucose records reviewed. [] See glucose records for details (to be scanned). [] A1C  [x] Reviewed labs      Lab Results   Component Value Date/Time    Hemoglobin A1c 12.8 (H) 04/06/2018 01:36 PM    Hemoglobin A1c 9.9 (H) 06/15/2017 03:35 PM    Hemoglobin A1c 10.3 (H) 08/31/2016 01:53 PM    Glucose 337 (H) 04/06/2018 01:36 PM    Glucose  12/01/2017 04:01 PM    Microalb/Creat ratio (ug/mg creat.) 28.8 04/06/2018 01:36 PM    LDL, calculated 109 (H) 04/06/2018 01:36 PM    Creatinine 0.62 04/06/2018 01:36 PM      Lab Results   Component Value Date/Time    Cholesterol, total 217 (H) 04/06/2018 01:36 PM    HDL Cholesterol 68 04/06/2018 01:36 PM    LDL, calculated 109 (H) 04/06/2018 01:36 PM    Triglyceride 198 (H) 04/06/2018 01:36 PM     Lab Results   Component Value Date/Time    ALT (SGPT) 12 04/06/2018 01:36 PM    AST (SGOT) 21 04/06/2018 01:36 PM    Alk.  phosphatase 79 04/06/2018 01:36 PM    Bilirubin, total 0.2 04/06/2018 01:36 PM     Lab Results   Component Value Date/Time    GFR est  04/06/2018 01:36 PM    GFR est non- 04/06/2018 01:36 PM    Creatinine 0.62 04/06/2018 01:36 PM    BUN 10 04/06/2018 01:36 PM    Sodium 133 (L) 04/06/2018 01:36 PM    Potassium 4.3 04/06/2018 01:36 PM    Chloride 94 (L) 04/06/2018 01:36 PM    CO2 24 04/06/2018 01:36 PM      Lab Results   Component Value Date/Time TSH 2.460 08/31/2016 01:53 PM           Assessment/Plan:     1. Type 2 Diabetes Mellitus , uncontrolled. Lab Results   Component Value Date/Time    Hemoglobin A1c 12.8 (H) 04/06/2018 01:36 PM    Hemoglobin A1c (POC) 11.3 12/01/2017 04:01 PM     Poorly controlled diabetes due to noncompliance with the medications  She has no blurred vision polyuria, hence assuming that the blood glucose is better. Checked the blood glucose in the office and it was 290 which were showed to the patient agreed to take the insulin she is out going out of [place] 2 [place] and hence can not come for the follow-up appointment. Lantus 26 units Continue metformin 1000 mg BID  Januvia 100 mg in AM - self stopped   Discussed the importance of checking home glucose regularly and taking all of their scheduled medications in order to have the best possible outcome. Reviewed the complications and importance of diet. Discussed about the diet, carbohydrate portion control and increase activity as tolerated. FLU annually ,Pneumovax ,aspirin daily,annual eye exam,microalbumin    2. Obesity:Body mass index is 39.03 kg/(m^2). BMI is out of normal parameters and plan is as follows: I have counseled this patient on diet and exercise regimens. 3. Hyperlipidemia: statin       Counseled extensively, discussed the long-term complications, she understands the risk she was offered sooner appointment to monitor the blood glucose control however due to out of country plans she cannot come. She is at a very high risk for complications including blindness, renal failure, strokes and CAD      Follow-up Disposition: Not on File    Thank you for allowing me to participate in the care of this patient.     Sammie Cormier MD    Patient verbalized understanding

## 2018-06-12 RX ORDER — INSULIN GLARGINE 100 [IU]/ML
INJECTION, SOLUTION SUBCUTANEOUS
Qty: 15 ML | Refills: 4 | Status: SHIPPED | OUTPATIENT
Start: 2018-06-12 | End: 2019-02-11 | Stop reason: SDUPTHER

## 2018-10-19 DIAGNOSIS — E11.65 UNCONTROLLED TYPE 2 DIABETES MELLITUS WITH HYPERGLYCEMIA (HCC): ICD-10-CM

## 2018-10-19 DIAGNOSIS — E11.65 UNCONTROLLED TYPE 2 DIABETES MELLITUS WITH HYPERGLYCEMIA, WITH LONG-TERM CURRENT USE OF INSULIN (HCC): ICD-10-CM

## 2018-10-19 DIAGNOSIS — Z79.4 UNCONTROLLED TYPE 2 DIABETES MELLITUS WITH HYPERGLYCEMIA, WITH LONG-TERM CURRENT USE OF INSULIN (HCC): ICD-10-CM

## 2018-10-19 RX ORDER — HYDROXYZINE 25 MG/1
TABLET, FILM COATED ORAL
Qty: 90 TAB | Refills: 3 | Status: SHIPPED | OUTPATIENT
Start: 2018-10-19 | End: 2018-12-19 | Stop reason: SDUPTHER

## 2018-10-19 RX ORDER — METFORMIN HYDROCHLORIDE 1000 MG/1
TABLET ORAL
Qty: 180 TAB | Refills: 3 | Status: SHIPPED | OUTPATIENT
Start: 2018-10-19 | End: 2019-02-11 | Stop reason: SDUPTHER

## 2018-12-19 DIAGNOSIS — Z79.4 UNCONTROLLED TYPE 2 DIABETES MELLITUS WITH HYPERGLYCEMIA, WITH LONG-TERM CURRENT USE OF INSULIN (HCC): ICD-10-CM

## 2018-12-19 DIAGNOSIS — E11.65 UNCONTROLLED TYPE 2 DIABETES MELLITUS WITH HYPERGLYCEMIA (HCC): ICD-10-CM

## 2018-12-19 DIAGNOSIS — E11.65 UNCONTROLLED TYPE 2 DIABETES MELLITUS WITH HYPERGLYCEMIA, WITH LONG-TERM CURRENT USE OF INSULIN (HCC): ICD-10-CM

## 2018-12-20 RX ORDER — HYDROXYZINE 25 MG/1
TABLET, FILM COATED ORAL
Qty: 90 TAB | Refills: 3 | Status: SHIPPED | OUTPATIENT
Start: 2018-12-20 | End: 2020-01-13 | Stop reason: SDUPTHER

## 2019-02-11 ENCOUNTER — OFFICE VISIT (OUTPATIENT)
Dept: ENDOCRINOLOGY | Age: 49
End: 2019-02-11

## 2019-02-11 VITALS
BODY MASS INDEX: 39.78 KG/M2 | TEMPERATURE: 96.4 F | SYSTOLIC BLOOD PRESSURE: 127 MMHG | HEIGHT: 64 IN | WEIGHT: 233 LBS | OXYGEN SATURATION: 100 % | RESPIRATION RATE: 14 BRPM | DIASTOLIC BLOOD PRESSURE: 74 MMHG | HEART RATE: 89 BPM

## 2019-02-11 DIAGNOSIS — Z79.4 TYPE 2 DIABETES MELLITUS WITH HYPERGLYCEMIA, WITH LONG-TERM CURRENT USE OF INSULIN (HCC): Primary | ICD-10-CM

## 2019-02-11 DIAGNOSIS — E78.2 MIXED HYPERLIPIDEMIA: ICD-10-CM

## 2019-02-11 DIAGNOSIS — Z79.4 TYPE 2 DIABETES MELLITUS WITH HYPERGLYCEMIA, WITH LONG-TERM CURRENT USE OF INSULIN (HCC): ICD-10-CM

## 2019-02-11 DIAGNOSIS — E11.65 TYPE 2 DIABETES MELLITUS WITH HYPERGLYCEMIA, WITH LONG-TERM CURRENT USE OF INSULIN (HCC): ICD-10-CM

## 2019-02-11 DIAGNOSIS — Z79.4 UNCONTROLLED TYPE 2 DIABETES MELLITUS WITH HYPERGLYCEMIA, WITH LONG-TERM CURRENT USE OF INSULIN (HCC): ICD-10-CM

## 2019-02-11 DIAGNOSIS — E11.65 UNCONTROLLED TYPE 2 DIABETES MELLITUS WITH HYPERGLYCEMIA, WITH LONG-TERM CURRENT USE OF INSULIN (HCC): ICD-10-CM

## 2019-02-11 DIAGNOSIS — E11.65 TYPE 2 DIABETES MELLITUS WITH HYPERGLYCEMIA, WITH LONG-TERM CURRENT USE OF INSULIN (HCC): Primary | ICD-10-CM

## 2019-02-11 RX ORDER — PEN NEEDLE, DIABETIC 31 GX3/16"
NEEDLE, DISPOSABLE MISCELLANEOUS
Qty: 100 PEN NEEDLE | Refills: 3 | Status: SHIPPED | OUTPATIENT
Start: 2019-02-11 | End: 2022-04-22 | Stop reason: SDUPTHER

## 2019-02-11 RX ORDER — METFORMIN HYDROCHLORIDE 1000 MG/1
TABLET ORAL
Qty: 180 TAB | Refills: 3 | Status: SHIPPED | OUTPATIENT
Start: 2019-02-11 | End: 2020-08-17 | Stop reason: SDUPTHER

## 2019-02-11 RX ORDER — INSULIN GLARGINE 100 [IU]/ML
INJECTION, SOLUTION SUBCUTANEOUS
Qty: 30 ML | Refills: 3 | Status: SHIPPED | OUTPATIENT
Start: 2019-02-11 | End: 2019-05-03 | Stop reason: ALTCHOICE

## 2019-02-11 RX ORDER — LANCETS 33 GAUGE
EACH MISCELLANEOUS
Qty: 100 PACKAGE | Refills: 3 | Status: SHIPPED | OUTPATIENT
Start: 2019-02-11 | End: 2022-04-22 | Stop reason: SDUPTHER

## 2019-02-11 RX ORDER — LOVASTATIN 20 MG/1
40 TABLET ORAL DAILY
Qty: 180 TAB | Refills: 3 | Status: SHIPPED | OUTPATIENT
Start: 2019-02-11 | End: 2020-08-15

## 2019-02-11 NOTE — PROGRESS NOTES
Susu Cardoza is a 46 y.o. female here for Chief Complaint Patient presents with  Diabetes Functional glucose monitor and record keeping system? -yes Eye exam within last year? - due Foot exam within last year? - on file 1. Have you been to the ER, urgent care clinic since your last visit? Hospitalized since your last visit? -no 
 
2. Have you seen or consulted any other health care providers outside of the [address] [place] [address] since your last visit?   Include any pap smears or colon screening.-no

## 2019-02-11 NOTE — PROGRESS NOTES
[facility] DIABETES AND ENDOCRINOLOGY Prasanna Isabel MD 
 
    [address] [phone] Fax [phone] Patient Information Date:2/11/2019 Name : Zuleyka Peres 46 y.o.    
YOB: 1970 Referred by: Self referred Chief Complaint Patient presents with  Diabetes History of Present Illness: Zuleyka Peres is a 46 y.o. female here for follow up She has not been checking the blood glucose . she is sleeping better. She is on Metformin and self stopped Lantus . I have asked her to resume Lantus several times, she is going by the symptoms and since she feels better she is not taking insulin. She is taking only metformin She is not checking the blood glucose She has been gaining weight She was diagnosed with type 2 diabetes several years ago She is a hospice nurse by profession, knows about all complications and also the nutritional facts but it is just that she has difficulty following it. She has a family history of type 2 diabetes. Wt Readings from Last 3 Encounters:  
02/11/19 233 lb (105.7 kg) 05/11/18 227 lb 6.4 oz (103.1 kg) 12/01/17 254 lb 12.8 oz (115.6 kg) BP Readings from Last 3 Encounters:  
02/11/19 127/74  
05/11/18 131/84  
12/01/17 123/74 Past Medical History:  
Diagnosis Date  Hyperlipidemia  Obesity  Type II or unspecified type diabetes mellitus without mention of complication, uncontrolled Current Outpatient Medications Medication Sig  
 hydrOXYzine HCl (ATARAX) 25 mg tablet TAKE 1 TABLET BY MOUTH EVERY night  metFORMIN (GLUCOPHAGE) 1,000 mg tablet TAKE 1 TABLET BY MOUTH TWICE A DAY WITH MEALS  
 BASAGLAR KWIKPEN U-100 INSULIN 100 unit/mL (3 mL) inpn INJECT 25 UNITS EVERY DAY AT 9PM AS DIRECTED  Blood-Glucose Meter (TRUE METRIX AIR GLUCOSE METER) monitoring kit Test blood glucose once daily Dx Code: E11.65  
  lancets (TRUEPLUS LANCETS) 33 gauge misc Test blood glucose once daily Dx Code: E11.65  
 glucose blood VI test strips (TRUE METRIX GLUCOSE TEST STRIP) strip TEST BLOOD GLUCOSE ONCE DAILY Dx Code: E11.65  
 clobetasol (TEMOVATE) 0.05 % ointment APPLY TO THE AFFECTED AREAS AS DIRECTED  APPLE CIDER VINEGAR PO Take  by mouth two (2) times a day.  TURMERIC, BULK, by Does Not Apply route.  Insulin Needles, Disposable, (ESVIN PEN NEEDLE) 32 gauge x 5/32\" ndle USE TO INJECT LANTUS  co-enzyme Q-10 (CO Q-10) 100 mg capsule Take 100 mg by mouth daily.  OTHER Take 4 Tabs by mouth daily. Bio Strath-   
 GREEN TEA LEAF EXTRACT (GREEN TEA PO) Take  by mouth daily.  GARLIC PO Take  by mouth daily.  cinnamon bark (CINNAMON) 500 mg cap Take  by mouth.  cholecalciferol, VITAMIN D3, (VITAMIN D3) 5,000 unit tab tablet Take  by mouth daily.  omega-3 fatty acids-vitamin e (FISH OIL) 1,000 mg cap Take 1 Cap by mouth.  lovastatin (MEVACOR) 20 mg tablet No current facility-administered medications for this visit. Allergies Allergen Reactions  Other Medication Swelling Antibiotics Review of Systems: 
- Constitutional Symptoms: no fevers, no chills - Eyes: no blurry vision no double vision - Cardiovascular: no chest pain ,no palpitations - Respiratory: no cough no shortness of breath - Integumentary: no rashes - Neurological: no numbness, tingling, no  headaches - Physical Examination: 
 Blood pressure 127/74, pulse 89, temperature 96.4 °F (35.8 °C), temperature source Oral, resp. rate 14, height 5' 4\" (1.626 m), weight 233 lb (105.7 kg), SpO2 100 %. Estimated body mass index is 39.99 kg/m² as calculated from the following: 
  Height as of this encounter: 5' 4\" (1.626 m). -   Weight as of this encounter: 233 lb (105.7 kg). - General: pleasant, no distress, good eye contact 
- HEENT: no pallor, no periorbital edema, EOMI 
- Neck: supple, no thyromegaly - Cardiovascular: regular, normal rate, normal S1 and S2 
- Respiratory: clear to auscultation bilaterally - Gastrointestinal: soft, nontender, nondistended,  BS + 
- Musculoskeletal: no edema, no foot ulcers Diabetic feet exam ; May 2018 H/o partial or complete amputation of foot : No 
H/o previous foot ulceration : No 
H/o pre - ulcerative callus : No 
H/o peripheral neuropathy and callus : No 
H/o poor circulation  : No 
Foot deformity : No 
-  
- Neurological:alert and oriented - Psychiatric: normal mood and affect 
- Skin: color, texture, turgor normal.  
 
 
Data Reviewed:  
 
[] Glucose records reviewed. [] See glucose records for details (to be scanned). [] A1C [x] Reviewed labs Lab Results Component Value Date/Time Hemoglobin A1c 13.0 (H) 02/04/2019 03:15 PM  
 Hemoglobin A1c 12.8 (H) 04/06/2018 01:36 PM  
 Hemoglobin A1c 9.9 (H) 06/15/2017 03:35 PM  
 Glucose 366 (H) 02/04/2019 03:15 PM  
 Glucose  12/01/2017 04:01 PM  
 Microalb/Creat ratio (ug/mg creat.) 54.5 (H) 02/04/2019 03:15 PM  
 LDL, calculated 100 (H) 02/04/2019 03:15 PM  
 Creatinine 0.65 02/04/2019 03:15 PM  
  
Lab Results Component Value Date/Time Cholesterol, total 208 (H) 02/04/2019 03:15 PM  
 HDL Cholesterol 76 02/04/2019 03:15 PM  
 LDL, calculated 100 (H) 02/04/2019 03:15 PM  
 Triglyceride 161 (H) 02/04/2019 03:15 PM  
 
Lab Results Component Value Date/Time ALT (SGPT) 17 02/04/2019 03:15 PM  
 AST (SGOT) 27 02/04/2019 03:15 PM  
 Alk. phosphatase 88 02/04/2019 03:15 PM  
 Bilirubin, total <0.2 02/04/2019 03:15 PM  
 
Lab Results Component Value Date/Time GFR est  02/04/2019 03:15 PM  
 GFR est non- 02/04/2019 03:15 PM  
 Creatinine 0.65 02/04/2019 03:15 PM  
 BUN 7 02/04/2019 03:15 PM  
 Sodium 137 02/04/2019 03:15 PM  
 Potassium 4.3 02/04/2019 03:15 PM  
 Chloride 94 (L) 02/04/2019 03:15 PM  
 CO2 23 02/04/2019 03:15 PM  
  
Lab Results Component Value Date/Time TSH 2.460 08/31/2016 01:53 PM  
  
  
 
Assessment/Plan: 1. Type 2 Diabetes Mellitus , uncontrolled. Lab Results Component Value Date/Time Hemoglobin A1c 13.0 (H) 02/04/2019 03:15 PM  
 Hemoglobin A1c (POC) 11.3 12/01/2017 04:01 PM  
Severe hyperglycemia, blood glucose more than 350 Poorly controlled diabetes due to noncompliance with the medications Counseled multiple times in the past ,again long-term comp occasions discussed Lantus 26 units Continue metformin 1000 mg BID Januvia 100 mg in AM - self stopped Discussed the importance of checking home glucose regularly and taking all of their scheduled medications in order to have the best possible outcome. Reviewed the complications and importance of diet. Discussed about the diet, carbohydrate portion control and increase activity as tolerated. FLU annually ,Pneumovax ,aspirin daily,annual eye exam,microalbumin 2. Obesity:Body mass index is 39.99 kg/m². BMI is out of normal parameters and plan is as follows: I have counseled this patient on diet and exercise regimens. 3. Hyperlipidemia: statin Counseled extensively, discussed the long-term complications, she understands the risk she was offered sooner appointment to monitor the blood glucose control however [place] come. She is at a very high risk for complications including blindness, renal failure, strokes and CAD Follow-up Disposition: Not on File Thank you for allowing me to participate in the care of this patient. Adeline Jiménez MD 
 
Patient verbalized understanding

## 2019-02-14 DIAGNOSIS — L29.9 ITCHING: Primary | ICD-10-CM

## 2019-02-14 RX ORDER — NYSTATIN 100000 U/G
OINTMENT TOPICAL 2 TIMES DAILY
Qty: 15 G | Refills: 0 | Status: CANCELLED | OUTPATIENT
Start: 2019-02-14

## 2019-02-14 NOTE — TELEPHONE ENCOUNTER
Patient says Nyestatin 60 gram ointment should have been sent to [facility]. Patient says ointment is used for itching and Dr. Madalyn Arriaga agreed to order.

## 2019-02-15 NOTE — TELEPHONE ENCOUNTER
Informed pt that Dr Kavon Mokn does not remember discussing with pt. Pt states it is for a yeast infection. Asked pt to be evaluated by PCP as she may need a pap smear to determine other causes. Pt verbalized understanding.

## 2019-02-15 NOTE — TELEPHONE ENCOUNTER
I dont remember discussing about the cream - is it for yeast infection ? If she is having yeast infection need to be seen by PCP for eval to look for other causes and pap smears .     If it for generalized itching cetrizine 10 mg OTC , again these need to be addressed by PCP

## 2019-05-02 DIAGNOSIS — Z79.4 TYPE 2 DIABETES MELLITUS WITH HYPERGLYCEMIA, WITH LONG-TERM CURRENT USE OF INSULIN (HCC): Primary | ICD-10-CM

## 2019-05-02 DIAGNOSIS — E11.65 TYPE 2 DIABETES MELLITUS WITH HYPERGLYCEMIA, WITH LONG-TERM CURRENT USE OF INSULIN (HCC): Primary | ICD-10-CM

## 2019-05-03 RX ORDER — INSULIN GLARGINE 100 [IU]/ML
INJECTION, SOLUTION SUBCUTANEOUS
Qty: 30 ML | Refills: 3 | Status: SHIPPED | OUTPATIENT
Start: 2019-05-03 | End: 2019-05-24 | Stop reason: SDUPTHER

## 2019-05-19 DIAGNOSIS — E11.65 UNCONTROLLED TYPE 2 DIABETES MELLITUS WITH HYPERGLYCEMIA, WITH LONG-TERM CURRENT USE OF INSULIN (HCC): ICD-10-CM

## 2019-05-19 DIAGNOSIS — E11.65 UNCONTROLLED TYPE 2 DIABETES MELLITUS WITH HYPERGLYCEMIA (HCC): ICD-10-CM

## 2019-05-19 DIAGNOSIS — Z79.4 UNCONTROLLED TYPE 2 DIABETES MELLITUS WITH HYPERGLYCEMIA, WITH LONG-TERM CURRENT USE OF INSULIN (HCC): ICD-10-CM

## 2019-05-21 NOTE — PROGRESS NOTES
Ariel Lanza is a 46 y.o. female here for   Chief Complaint   Patient presents with    Diabetes    Diabetic Foot Exam       Functional glucose monitor and record keeping system? - yes  Eye exam within last year? -  Going soon  Foot exam within last year? - due    1. Have you been to the ER, urgent care clinic since your last visit? Hospitalized since your last visit? -[facility] for allergic reaction a month ago    2. Have you seen or consulted any other health care providers outside of the [address] [place] [address] since your last visit?   Include any pap smears or colon screening.-no

## 2019-05-22 ENCOUNTER — OFFICE VISIT (OUTPATIENT)
Dept: ENDOCRINOLOGY | Age: 49
End: 2019-05-22

## 2019-05-22 VITALS
SYSTOLIC BLOOD PRESSURE: 124 MMHG | HEIGHT: 64 IN | BODY MASS INDEX: 38.58 KG/M2 | OXYGEN SATURATION: 98 % | WEIGHT: 226 LBS | HEART RATE: 106 BPM | RESPIRATION RATE: 14 BRPM | DIASTOLIC BLOOD PRESSURE: 76 MMHG | TEMPERATURE: 97 F

## 2019-05-22 DIAGNOSIS — E78.2 MIXED HYPERLIPIDEMIA: ICD-10-CM

## 2019-05-22 DIAGNOSIS — E11.65 TYPE 2 DIABETES MELLITUS WITH HYPERGLYCEMIA, WITH LONG-TERM CURRENT USE OF INSULIN (HCC): Primary | ICD-10-CM

## 2019-05-22 DIAGNOSIS — E66.9 NON MORBID OBESITY: ICD-10-CM

## 2019-05-22 DIAGNOSIS — Z79.4 TYPE 2 DIABETES MELLITUS WITH HYPERGLYCEMIA, WITH LONG-TERM CURRENT USE OF INSULIN (HCC): Primary | ICD-10-CM

## 2019-05-22 DIAGNOSIS — E11.21 TYPE 2 DIABETES WITH NEPHROPATHY (HCC): ICD-10-CM

## 2019-05-22 NOTE — PROGRESS NOTES
[facility]               Austin Covington MD        [address] [phone] Fax [phone]           Patient Information  Date:5/22/2019  Name : Collette Spar 46 y.o.     YOB: 1970         Referred by: Self referred        Chief Complaint   Patient presents with    Diabetes    Diabetic Foot Exam       History of Present Illness: Collette Spar is a 46 y.o. female here for follow up  May 2019  She did not bring the meter  Reportedly taking both Lantus and metformin. She has lost weight, checking blood glucose at home, mostly fasting, less than 130. Need labs. Prior history February 2019  She has not been checking the blood glucose . she is sleeping better. She is on Metformin and self stopped Lantus . I have asked her to resume Lantus several times, she is going by the symptoms and since she feels better she is not taking insulin. 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 She has a family history of type 2 diabetes.         Wt Readings from Last 3 Encounters:   05/22/19 226 lb (102.5 kg)   02/11/19 233 lb (105.7 kg)   05/11/18 227 lb 6.4 oz (103.1 kg)       BP Readings from Last 3 Encounters:   05/22/19 124/76   02/11/19 127/74   05/11/18 131/84           Past Medical History:   Diagnosis Date    Hyperlipidemia     Obesity     Type II or unspecified type diabetes mellitus without mention of complication, uncontrolled      Current Outpatient Medications   Medication Sig    glucose blood VI test strips (TRUE METRIX GLUCOSE TEST STRIP) strip TEST ONCE A DAY    insulin glargine (LANTUS SOLOSTAR U-100 INSULIN) 100 unit/mL (3 mL) inpn INJECT 25 UNITS EVERY DAY AT 9PM AS DIRECTED. Stop Basaglar    glucose blood VI test strips (TRUE METRIX GLUCOSE TEST STRIP) strip TEST BLOOD GLUCOSE ONCE DAILY Dx Code: E11.65    metFORMIN (GLUCOPHAGE) 1,000 mg tablet TAKE 1 TABLET BY MOUTH TWICE A DAY WITH MEALS    lovastatin (MEVACOR) 20 mg tablet Take 2 Tabs by mouth daily.  lancets (TRUEPLUS LANCETS) 33 gauge misc Test blood glucose once daily Dx Code: E11.65    Insulin Needles, Disposable, (ESVIN PEN NEEDLE) 32 gauge x 5/32\" ndle USE TO INJECT LANTUS    hydrOXYzine HCl (ATARAX) 25 mg tablet TAKE 1 TABLET BY MOUTH EVERY night    Blood-Glucose Meter (TRUE METRIX AIR GLUCOSE METER) monitoring kit Test blood glucose once daily Dx Code: E11.65    clobetasol (TEMOVATE) 0.05 % ointment APPLY TO THE AFFECTED AREAS AS DIRECTED    APPLE CIDER VINEGAR PO Take  by mouth two (2) times a day.  TURMERIC, BULK, by Does Not Apply route.  co-enzyme Q-10 (CO Q-10) 100 mg capsule Take 100 mg by mouth daily.  OTHER Take 4 Tabs by mouth daily. Bio Strath-     GREEN TEA LEAF EXTRACT (GREEN TEA PO) Take  by mouth daily.  cholecalciferol, VITAMIN D3, (VITAMIN D3) 5,000 unit tab tablet Take  by mouth daily.  GARLIC PO Take  by mouth daily.  cinnamon bark (CINNAMON) 500 mg cap Take  by mouth.  omega-3 fatty acids-vitamin e (FISH OIL) 1,000 mg cap Take 1 Cap by mouth. No current facility-administered medications for this visit. Allergies   Allergen Reactions    Other Medication Swelling     Antibiotics         Review of Systems:  - Constitutional Symptoms: no fevers, no chills   - Eyes: no blurry vision no double vision  - Cardiovascular: no chest pain ,no palpitations  - Respiratory: no cough no shortness of breath  - Integumentary: no rashes  - Neurological: no numbness, tingling, no  headaches  -     Physical Examination:   Blood pressure 124/76, pulse (!) 106, temperature 97 °F (36.1 °C), temperature source Oral, resp.  rate 14, height 5' 4\" (1.626 m), weight 226 lb (102.5 kg), SpO2 98 %. Estimated body mass index is 38.79 kg/m² as calculated from the following:    Height as of this encounter: 5' 4\" (1.626 m). -   Weight as of this encounter: 226 lb (102.5 kg). - General: pleasant, no distress, good eye contact  - HEENT: no pallor, no periorbital edema, EOMI  - Neck: supple, no thyromegaly  - Cardiovascular: regular, normal rate, normal S1 and S2  - Respiratory: clear to auscultation bilaterally  - Gastrointestinal: soft, nontender, nondistended,  BS +  - Musculoskeletal: no edema, no foot ulcers  Diabetic feet exam ; May 2019    H/o partial or complete amputation of foot : No  H/o previous foot ulceration : No  H/o pre - ulcerative callus : No  H/o peripheral neuropathy and callus : No  H/o poor circulation  : No  Foot deformity : No  -   - Neurological:alert and oriented  - Psychiatric: normal mood and affect  - Skin: color, texture, turgor normal.       Data Reviewed:     [] Glucose records reviewed. [] See glucose records for details (to be scanned). [] A1C  [x] Reviewed labs      Lab Results   Component Value Date/Time    Hemoglobin A1c 13.0 (H) 02/04/2019 03:15 PM    Hemoglobin A1c 12.8 (H) 04/06/2018 01:36 PM    Hemoglobin A1c 9.9 (H) 06/15/2017 03:35 PM    Glucose 366 (H) 02/04/2019 03:15 PM    Glucose  12/01/2017 04:01 PM    Microalb/Creat ratio (ug/mg creat.) 54.5 (H) 02/04/2019 03:15 PM    LDL, calculated 100 (H) 02/04/2019 03:15 PM    Creatinine 0.65 02/04/2019 03:15 PM      Lab Results   Component Value Date/Time    Cholesterol, total 208 (H) 02/04/2019 03:15 PM    HDL Cholesterol 76 02/04/2019 03:15 PM    LDL, calculated 100 (H) 02/04/2019 03:15 PM    Triglyceride 161 (H) 02/04/2019 03:15 PM     Lab Results   Component Value Date/Time    ALT (SGPT) 17 02/04/2019 03:15 PM    AST (SGOT) 27 02/04/2019 03:15 PM    Alk.  phosphatase 88 02/04/2019 03:15 PM    Bilirubin, total <0.2 02/04/2019 03:15 PM     Lab Results   Component Value Date/Time    GFR est AA 120 02/04/2019 03:15 PM    GFR est non- 02/04/2019 03:15 PM    Creatinine 0.65 02/04/2019 03:15 PM    BUN 7 02/04/2019 03:15 PM    Sodium 137 02/04/2019 03:15 PM    Potassium 4.3 02/04/2019 03:15 PM    Chloride 94 (L) 02/04/2019 03:15 PM    CO2 23 02/04/2019 03:15 PM      Lab Results   Component Value Date/Time    TSH 2.460 08/31/2016 01:53 PM           Assessment/Plan:     1. Type 2 Diabetes Mellitus , uncontrolled. Lab Results   Component Value Date/Time    Hemoglobin A1c 13.0 (H) 02/04/2019 03:15 PM    Hemoglobin A1c (POC) 11.3 12/01/2017 04:01 PM   Need to check labs  Reportedly blood glucose is improved    Poorly controlled diabetes due to noncompliance with the medications  Counseled multiple times  Lantus 26 units Continue metformin 1000 mg BID  Januvia 100 mg in AM - self stopped   Discussed the importance of checking home glucose regularly and taking all of their scheduled medications in order to have the best possible outcome. Reviewed the complications and importance of diet. Discussed about the diet, carbohydrate portion control and increase activity as tolerated. FLU annually ,Pneumovax ,aspirin daily,annual eye exam,microalbumin    2. Obesity:Body mass index is 38.79 kg/m². BMI is out of normal parameters and plan is as follows: I have counseled this patient on diet and exercise regimens. 3. Hyperlipidemia: statin       Counseled extensively, discussed the long-term complications, she understands the risk she was offered sooner appointment to monitor the blood glucose control however due to out of country plans she cannot come. She is at a very high risk for complications including blindness, renal failure, strokes and CAD          Thank you for allowing me to participate in the care of this patient.     Paul Rushing MD    Patient verbalized understanding

## 2019-05-22 NOTE — LETTER
5/22/19 Patient: Jerel Aguirre YOB: 1970 Date of Visit: 5/22/2019 Amy Munson MD 
[address] [place] [address]. [address] [facility] [address] VIA Facsimile: [phone] Dear Amy uMnson MD, Thank you for referring Ms. Jerel Aguirre to [address] [place] [address] for evaluation. My notes for this consultation are attached. If you have questions, please do not hesitate to call me. I look forward to following your patient along with you. Sincerely, Sherren Fischer, MD

## 2019-05-23 LAB
EST. AVERAGE GLUCOSE BLD GHB EST-MCNC: 315 MG/DL
HBA1C MFR BLD: 12.6 % (ref 4.8–5.6)

## 2019-05-24 ENCOUNTER — TELEPHONE (OUTPATIENT)
Dept: ENDOCRINOLOGY | Age: 49
End: 2019-05-24

## 2019-05-24 DIAGNOSIS — E11.65 TYPE 2 DIABETES MELLITUS WITH HYPERGLYCEMIA, WITH LONG-TERM CURRENT USE OF INSULIN (HCC): ICD-10-CM

## 2019-05-24 DIAGNOSIS — Z79.4 TYPE 2 DIABETES MELLITUS WITH HYPERGLYCEMIA, WITH LONG-TERM CURRENT USE OF INSULIN (HCC): ICD-10-CM

## 2019-05-24 RX ORDER — INSULIN GLARGINE 100 [IU]/ML
INJECTION, SOLUTION SUBCUTANEOUS
Qty: 40 ML | Refills: 3 | Status: SHIPPED | OUTPATIENT
Start: 2019-05-24 | End: 2022-04-22 | Stop reason: SDUPTHER

## 2019-05-24 NOTE — TELEPHONE ENCOUNTER
Attempted to call. Unsuccessful. Left [facility] for Eguenie Arora to give us a call back at the office. A callback number was left.

## 2019-05-24 NOTE — TELEPHONE ENCOUNTER
Per Dr. Boubacar Ramos, informed pt of result note, as noted above. Pt verbalized understanding with no further questions or concerns at this time.

## 2019-05-24 NOTE — TELEPHONE ENCOUNTER
----- Message from Tayler Rm MD sent at 5/23/2019  5:17 PM EDT -----  Her sugars are still high based on A1C of 12.6  , Increase insulin to 35 units

## 2020-01-09 ENCOUNTER — TELEPHONE (OUTPATIENT)
Dept: ENDOCRINOLOGY | Age: 50
End: 2020-01-09

## 2020-01-09 DIAGNOSIS — Z79.4 UNCONTROLLED TYPE 2 DIABETES MELLITUS WITH HYPERGLYCEMIA, WITH LONG-TERM CURRENT USE OF INSULIN (HCC): Primary | ICD-10-CM

## 2020-01-09 DIAGNOSIS — E78.2 MIXED HYPERLIPIDEMIA: ICD-10-CM

## 2020-01-09 DIAGNOSIS — E11.65 UNCONTROLLED TYPE 2 DIABETES MELLITUS WITH HYPERGLYCEMIA, WITH LONG-TERM CURRENT USE OF INSULIN (HCC): Primary | ICD-10-CM

## 2020-01-13 DIAGNOSIS — E11.65 UNCONTROLLED TYPE 2 DIABETES MELLITUS WITH HYPERGLYCEMIA (HCC): ICD-10-CM

## 2020-01-13 DIAGNOSIS — E11.65 UNCONTROLLED TYPE 2 DIABETES MELLITUS WITH HYPERGLYCEMIA, WITH LONG-TERM CURRENT USE OF INSULIN (HCC): ICD-10-CM

## 2020-01-13 DIAGNOSIS — Z79.4 UNCONTROLLED TYPE 2 DIABETES MELLITUS WITH HYPERGLYCEMIA, WITH LONG-TERM CURRENT USE OF INSULIN (HCC): ICD-10-CM

## 2020-01-13 RX ORDER — HYDROXYZINE 25 MG/1
TABLET, FILM COATED ORAL
Qty: 90 TAB | Refills: 3 | Status: SHIPPED | OUTPATIENT
Start: 2020-01-13 | End: 2022-04-25 | Stop reason: SDUPTHER

## 2020-01-13 NOTE — TELEPHONE ENCOUNTER
PCP: Pablo Rouse MD    Last appt: 5/22/2019  Future Appointments   Date Time Provider Nayan Bey   1/20/2020  2:45 PM Little Turner MD CDE [address]       Requested Prescriptions     Pending Prescriptions Disp Refills    hydrOXYzine HCl (ATARAX) 25 mg tablet 90 Tab 3     Sig: TAKE 1 TABLET BY MOUTH EVERY night

## 2020-05-13 ENCOUNTER — VIRTUAL VISIT (OUTPATIENT)
Dept: ENDOCRINOLOGY | Age: 50
End: 2020-05-13

## 2020-05-13 DIAGNOSIS — E11.65 TYPE 2 DIABETES MELLITUS WITH HYPERGLYCEMIA, WITH LONG-TERM CURRENT USE OF INSULIN (HCC): Primary | ICD-10-CM

## 2020-05-13 DIAGNOSIS — Z79.4 TYPE 2 DIABETES MELLITUS WITH HYPERGLYCEMIA, WITH LONG-TERM CURRENT USE OF INSULIN (HCC): Primary | ICD-10-CM

## 2020-05-13 DIAGNOSIS — E66.9 NON MORBID OBESITY: ICD-10-CM

## 2020-05-13 DIAGNOSIS — E11.65 TYPE 2 DIABETES MELLITUS WITH HYPERGLYCEMIA, WITH LONG-TERM CURRENT USE OF INSULIN (HCC): ICD-10-CM

## 2020-05-13 DIAGNOSIS — Z79.4 TYPE 2 DIABETES MELLITUS WITH HYPERGLYCEMIA, WITH LONG-TERM CURRENT USE OF INSULIN (HCC): ICD-10-CM

## 2020-05-13 DIAGNOSIS — E78.2 MIXED HYPERLIPIDEMIA: ICD-10-CM

## 2020-05-13 DIAGNOSIS — Z79.4 UNCONTROLLED TYPE 2 DIABETES MELLITUS WITH HYPERGLYCEMIA, WITH LONG-TERM CURRENT USE OF INSULIN (HCC): ICD-10-CM

## 2020-05-13 DIAGNOSIS — E11.65 UNCONTROLLED TYPE 2 DIABETES MELLITUS WITH HYPERGLYCEMIA, WITH LONG-TERM CURRENT USE OF INSULIN (HCC): ICD-10-CM

## 2020-05-13 RX ORDER — GABAPENTIN 100 MG/1
100 CAPSULE ORAL 3 TIMES DAILY
Qty: 270 CAP | Refills: 3 | Status: SHIPPED | OUTPATIENT
Start: 2020-05-13 | End: 2022-04-22

## 2020-05-13 RX ORDER — BISMUTH SUBSALICYLATE 262 MG
1 TABLET,CHEWABLE ORAL DAILY
COMMUNITY

## 2020-05-13 RX ORDER — CLOBETASOL PROPIONATE 0.5 MG/G
OINTMENT TOPICAL
Qty: 15 G | Refills: 3 | Status: SHIPPED | OUTPATIENT
Start: 2020-05-13

## 2020-05-13 NOTE — PROGRESS NOTES
Rosario Granda is a 44 y.o. female here for   Chief Complaint   Patient presents with    Diabetes     LV 5/2019     Pt consented to virtual visit. 1. Have you been to the ER, urgent care clinic since your last visit? Hospitalized since your last visit? -[facility] ER in Jan for skin allergy    2. Have you seen or consulted any other health care providers outside of the [address] [place] [address] since your last visit?   Include any pap smears or colon screening.-no    Order placed for pt per verbal order with read back from Dr. Saintclair Griffith 05/13/20

## 2020-05-13 NOTE — PROGRESS NOTES
Mehrdad Rogel MD      Patient Information  Date:5/13/2020  Name : Trae Estevez 44 y.o.     YOB: 1970         Referred by: Self referred        Chief Complaint   Patient presents with    Diabetes     LV 5/2019   Pursuant to the emergency declaration under the [facility] [place] [address] [facility] authority and the [employer] Act, this Virtual  Visit was conducted, with patient's consent, to reduce the patient's risk of exposure to COVID-19 . Patient  is aware that this is a billable encounter and is responsible for copays/deductibles       Services were provided through a video synchronous discussion virtually to substitute for in-person clinic visit. Place of service: Provider : Office  Patient: Home    History of Present Illness: Trae Estevez is a 44 y.o. female here for follow up  Last seen almost a year ago  Not checking the blood glucose  Last A1c was very high  Complains of more pain in the feet, requesting gabapentin  Working nights now  She has lost weight, has some polyuria  Taking the medication consistently  No fever, cough, chest pain          Prior history February 2019  She has not been checking the blood glucose . she is sleeping better. She is on Metformin and self stopped Lantus . I have asked her to resume Lantus several times, she is going by the symptoms and since she feels better she is not taking insulin. 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 She has a family history of type 2 diabetes.         Wt Readings from Last 3 Encounters:   05/22/19 226 lb (102.5 kg)   02/11/19 233 lb (105.7 kg)   05/11/18 227 lb 6.4 oz (103.1 kg)       BP Readings from Last 3 Encounters:   05/22/19 124/76   02/11/19 127/74   05/11/18 131/84           Past Medical History:   Diagnosis Date    Hyperlipidemia     Obesity     Type II or unspecified type diabetes mellitus without mention of complication, uncontrolled      Current Outpatient Medications   Medication Sig    multivitamin (ONE A DAY) tablet Take 1 Tab by mouth daily.  hydrOXYzine HCl (ATARAX) 25 mg tablet TAKE 1 TABLET BY MOUTH EVERY night    insulin glargine (LANTUS SOLOSTAR U-100 INSULIN) 100 unit/mL (3 mL) inpn INJECT 35 UNITS EVERY DAY AT 9PM AS DIRECTED. Stop Basaglar    glucose blood VI test strips (TRUE METRIX GLUCOSE TEST STRIP) strip TEST ONCE A DAY    glucose blood VI test strips (TRUE METRIX GLUCOSE TEST STRIP) strip TEST BLOOD GLUCOSE ONCE DAILY Dx Code: E11.65    metFORMIN (GLUCOPHAGE) 1,000 mg tablet TAKE 1 TABLET BY MOUTH TWICE A DAY WITH MEALS    lovastatin (MEVACOR) 20 mg tablet Take 2 Tabs by mouth daily.  lancets (TRUEPLUS LANCETS) 33 gauge misc Test blood glucose once daily Dx Code: E11.65    Insulin Needles, Disposable, (ESVIN PEN NEEDLE) 32 gauge x 5/32\" ndle USE TO INJECT LANTUS    Blood-Glucose Meter (TRUE METRIX AIR GLUCOSE METER) monitoring kit Test blood glucose once daily Dx Code: E11.65    clobetasol (TEMOVATE) 0.05 % ointment APPLY TO THE AFFECTED AREAS AS DIRECTED    APPLE CIDER VINEGAR PO Take  by mouth three (3) times daily.  OTHER Take 4 Tabs by mouth daily. Bio Strath-     cholecalciferol, VITAMIN D3, (VITAMIN D3) 5,000 unit tab tablet Take  by mouth daily.  TURMERIC, BULK, by Does Not Apply route.  co-enzyme Q-10 (CO Q-10) 100 mg capsule Take 100 mg by mouth daily.  GREEN TEA LEAF EXTRACT (GREEN TEA PO) Take  by mouth daily.  GARLIC PO Take  by mouth daily.  cinnamon bark (CINNAMON) 500 mg cap Take  by mouth.     omega-3 fatty acids-vitamin e (FISH OIL) 1,000 mg cap Take 1 Cap by mouth.     No current facility-administered medications for this visit. Allergies   Allergen Reactions    Other Medication Swelling     Antibiotics         Review of Systems:  - Per HPI    Physical Examination:   There were no vitals taken for this visit. Estimated body mass index is 38.79 kg/m² as calculated from the following:    Height as of 5/22/19: 5' 4\" (1.626 m). -   Weight as of 5/22/19: 226 lb (102.5 kg). - General: pleasant, no distress, good eye contact  - HEENT: no exophthalmos, no periorbital edema, EOMI  - Neck: No visible thyromegaly  - RS: Normal respiratory effort  - Musculoskeletal: no tremors  - Neurological: alert and oriented  - Psychiatric: normal mood and affect  - Skin: Normal color  Diabetic feet exam ; May 2019    H/o partial or complete amputation of foot : No  H/o previous foot ulceration : No  H/o pre - ulcerative callus : No  H/o peripheral neuropathy and callus : No  H/o poor circulation  : No  Foot deformity : No  -           Lab Results   Component Value Date/Time    Hemoglobin A1c 12.6 (H) 05/22/2019 11:34 AM    Hemoglobin A1c 13.0 (H) 02/04/2019 03:15 PM    Hemoglobin A1c 12.8 (H) 04/06/2018 01:36 PM    Glucose 366 (H) 02/04/2019 03:15 PM    Glucose  12/01/2017 04:01 PM    Microalb/Creat ratio (ug/mg creat.) 54.5 (H) 02/04/2019 03:15 PM    LDL, calculated 100 (H) 02/04/2019 03:15 PM    Creatinine 0.65 02/04/2019 03:15 PM      Lab Results   Component Value Date/Time    Cholesterol, total 208 (H) 02/04/2019 03:15 PM    HDL Cholesterol 76 02/04/2019 03:15 PM    LDL, calculated 100 (H) 02/04/2019 03:15 PM    Triglyceride 161 (H) 02/04/2019 03:15 PM     Lab Results   Component Value Date/Time    ALT (SGPT) 17 02/04/2019 03:15 PM    AST (SGOT) 27 02/04/2019 03:15 PM    Alk.  phosphatase 88 02/04/2019 03:15 PM    Bilirubin, total <0.2 02/04/2019 03:15 PM     Lab Results   Component Value Date/Time    GFR est  02/04/2019 03:15 PM    GFR est non- 02/04/2019 03:15 PM    Creatinine 0.65 02/04/2019 03:15 PM    BUN 7 02/04/2019 03:15 PM    Sodium 137 02/04/2019 03:15 PM    Potassium 4.3 02/04/2019 03:15 PM    Chloride 94 (L) 02/04/2019 03:15 PM    CO2 23 02/04/2019 03:15 PM      Lab Results   Component Value Date/Time    TSH 2.460 08/31/2016 01:53 PM           Assessment/Plan:     1. Type 2 Diabetes Mellitus , uncontrolled. Lab Results   Component Value Date/Time    Hemoglobin A1c 12.6 (H) 05/22/2019 11:34 AM    Hemoglobin A1c (POC) 11.3 12/01/2017 04:01 PM     Prior history of poorly controlled diabetes mellitus  Noncompliant with checking the blood glucose  Not sure the weight loss is due to hypperglycemia and glycosuria, need to check the labs    Counseled again about the complications  Lantus 26 units Continue metformin 1000 mg BID  Januvia 100 mg in AM - self stopped   Discussed the importance of checking home glucose regularly and taking all of their scheduled medications in order to have the best possible outcome. Reviewed the complications and importance of diet. Discussed about the diet, carbohydrate portion control and increase activity as tolerated. FLU annually ,Pneumovax ,aspirin daily,annual eye exam,microalbumin    2. Obesity:There is no height or weight on file to calculate BMI. BMI is out of normal parameters and plan is as follows: I have counseled this patient on diet and exercise regimens. 3. Hyperlipidemia: statin     4. Peripheral neuropathy: Stressed importance of good glycemic control  Gabapentin, side effects discussed    Counseled extensively, discussed the long-term complications, she understands the risk. She is at a very high risk for complications including blindness, renal failure, strokes and CAD          Thank you for allowing me to participate in the care of this patient.     Yaw Patrick MD    Patient verbalized understanding      Voice-recognition software was used to generate this report, which may result in some phonetic-based errors in the grammar and contents. Even though attempts were made to correct all the mistakes, some may have been missed and remained in the body of the report.

## 2020-06-01 ENCOUNTER — TELEPHONE (OUTPATIENT)
Dept: ENDOCRINOLOGY | Age: 50
End: 2020-06-01

## 2020-06-01 NOTE — TELEPHONE ENCOUNTER
----- Message from Dick Sevilla sent at 5/29/2020 10:33 AM EDT -----  Regarding: Dr. Debra Carson first and last name:  pt    Reason for call:  Requesting to speak with the nurse    Callback required yes/no and why:  yes    Best contact number(s):  [phone]    Details to clarify the request:  Pt still waiting on hard copy of lab orders.      Dick Sevilla

## 2020-06-01 NOTE — TELEPHONE ENCOUNTER
Pt states she has not received orders in the mail. Verified PO Box and explained orders were mailed. Explained once mail leaves office its sent to the hospital for mailing and I am not sure of processing time. Pt verbalized understanding and states if she does not receive by Friday she will contact office.

## 2020-08-14 DIAGNOSIS — Z79.4 TYPE 2 DIABETES MELLITUS WITH HYPERGLYCEMIA, WITH LONG-TERM CURRENT USE OF INSULIN (HCC): ICD-10-CM

## 2020-08-14 DIAGNOSIS — E78.2 MIXED HYPERLIPIDEMIA: ICD-10-CM

## 2020-08-14 DIAGNOSIS — E11.65 TYPE 2 DIABETES MELLITUS WITH HYPERGLYCEMIA, WITH LONG-TERM CURRENT USE OF INSULIN (HCC): ICD-10-CM

## 2020-08-15 RX ORDER — LOVASTATIN 20 MG/1
TABLET ORAL
Qty: 60 TAB | Refills: 11 | Status: SHIPPED | OUTPATIENT
Start: 2020-08-15 | End: 2022-02-27

## 2020-08-17 DIAGNOSIS — Z79.4 TYPE 2 DIABETES MELLITUS WITH HYPERGLYCEMIA, WITH LONG-TERM CURRENT USE OF INSULIN (HCC): ICD-10-CM

## 2020-08-17 DIAGNOSIS — E11.65 TYPE 2 DIABETES MELLITUS WITH HYPERGLYCEMIA, WITH LONG-TERM CURRENT USE OF INSULIN (HCC): ICD-10-CM

## 2020-08-17 RX ORDER — METFORMIN HYDROCHLORIDE 1000 MG/1
TABLET ORAL
Qty: 180 TAB | Refills: 3 | Status: SHIPPED | OUTPATIENT
Start: 2020-08-17 | End: 2021-11-24

## 2020-09-01 DIAGNOSIS — E11.65 TYPE 2 DIABETES MELLITUS WITH HYPERGLYCEMIA, WITH LONG-TERM CURRENT USE OF INSULIN (HCC): ICD-10-CM

## 2020-09-01 DIAGNOSIS — E78.2 MIXED HYPERLIPIDEMIA: ICD-10-CM

## 2020-09-01 DIAGNOSIS — Z79.4 TYPE 2 DIABETES MELLITUS WITH HYPERGLYCEMIA, WITH LONG-TERM CURRENT USE OF INSULIN (HCC): ICD-10-CM

## 2021-12-08 LAB — CREATININE, EXTERNAL: 0.68

## 2022-02-27 DIAGNOSIS — Z79.4 TYPE 2 DIABETES MELLITUS WITH HYPERGLYCEMIA, WITH LONG-TERM CURRENT USE OF INSULIN (HCC): ICD-10-CM

## 2022-02-27 DIAGNOSIS — E78.2 MIXED HYPERLIPIDEMIA: ICD-10-CM

## 2022-02-27 DIAGNOSIS — E11.65 TYPE 2 DIABETES MELLITUS WITH HYPERGLYCEMIA, WITH LONG-TERM CURRENT USE OF INSULIN (HCC): ICD-10-CM

## 2022-02-27 RX ORDER — LOVASTATIN 20 MG/1
TABLET ORAL
Qty: 180 TABLET | Refills: 3 | Status: SHIPPED | OUTPATIENT
Start: 2022-02-27 | End: 2022-04-23 | Stop reason: SDUPTHER

## 2022-03-18 PROBLEM — E11.21 TYPE 2 DIABETES WITH NEPHROPATHY (HCC): Status: ACTIVE | Noted: 2019-05-22

## 2022-03-19 PROBLEM — E66.01 MORBID OBESITY (HCC): Status: ACTIVE | Noted: 2017-12-02

## 2022-04-22 ENCOUNTER — OFFICE VISIT (OUTPATIENT)
Dept: ENDOCRINOLOGY | Age: 52
End: 2022-04-22
Payer: MEDICAID

## 2022-04-22 VITALS
TEMPERATURE: 97.6 F | HEIGHT: 64 IN | HEART RATE: 96 BPM | RESPIRATION RATE: 18 BRPM | WEIGHT: 208 LBS | OXYGEN SATURATION: 100 % | DIASTOLIC BLOOD PRESSURE: 81 MMHG | SYSTOLIC BLOOD PRESSURE: 115 MMHG | BODY MASS INDEX: 35.51 KG/M2

## 2022-04-22 DIAGNOSIS — E11.65 TYPE 2 DIABETES MELLITUS WITH HYPERGLYCEMIA, WITH LONG-TERM CURRENT USE OF INSULIN (HCC): Primary | ICD-10-CM

## 2022-04-22 DIAGNOSIS — Z79.4 UNCONTROLLED TYPE 2 DIABETES MELLITUS WITH HYPERGLYCEMIA, WITH LONG-TERM CURRENT USE OF INSULIN (HCC): ICD-10-CM

## 2022-04-22 DIAGNOSIS — E11.65 TYPE 2 DIABETES MELLITUS WITH HYPERGLYCEMIA, WITH LONG-TERM CURRENT USE OF INSULIN (HCC): ICD-10-CM

## 2022-04-22 DIAGNOSIS — Z79.4 TYPE 2 DIABETES MELLITUS WITH HYPERGLYCEMIA, WITH LONG-TERM CURRENT USE OF INSULIN (HCC): Primary | ICD-10-CM

## 2022-04-22 DIAGNOSIS — E11.65 UNCONTROLLED TYPE 2 DIABETES MELLITUS WITH HYPERGLYCEMIA, WITH LONG-TERM CURRENT USE OF INSULIN (HCC): ICD-10-CM

## 2022-04-22 DIAGNOSIS — Z79.4 TYPE 2 DIABETES MELLITUS WITH HYPERGLYCEMIA, WITH LONG-TERM CURRENT USE OF INSULIN (HCC): ICD-10-CM

## 2022-04-22 DIAGNOSIS — E78.2 MIXED HYPERLIPIDEMIA: ICD-10-CM

## 2022-04-22 DIAGNOSIS — E11.65 UNCONTROLLED TYPE 2 DIABETES MELLITUS WITH HYPERGLYCEMIA (HCC): ICD-10-CM

## 2022-04-22 PROCEDURE — 99215 OFFICE O/P EST HI 40 MIN: CPT | Performed by: INTERNAL MEDICINE

## 2022-04-22 PROCEDURE — 3046F HEMOGLOBIN A1C LEVEL >9.0%: CPT | Performed by: INTERNAL MEDICINE

## 2022-04-22 RX ORDER — HYDROXYZINE 25 MG/1
TABLET, FILM COATED ORAL
Qty: 90 TABLET | Refills: 3 | OUTPATIENT
Start: 2022-04-22

## 2022-04-22 RX ORDER — LOVASTATIN 20 MG/1
40 TABLET ORAL DAILY
Qty: 180 TABLET | Refills: 3 | Status: CANCELLED | OUTPATIENT
Start: 2022-04-22

## 2022-04-22 RX ORDER — CLOBETASOL PROPIONATE 0.5 MG/G
OINTMENT TOPICAL
Qty: 15 G | Refills: 3 | OUTPATIENT
Start: 2022-04-22

## 2022-04-22 NOTE — PROGRESS NOTES
[facility]               Kesha Leal MD      Patient Information  Date:4/23/2022  Name : Diana Catherine 46 y.o.     YOB: 1970         Referred by: Self referred        Chief Complaint   Patient presents with    Diabetes     LV 2020       History of Present Illness: Diana Catherine is a 46 y.o. female here for follow up  Last visit was 2 years ago in 2020  Had abscess due to severe hyperglycemia, s/p surgery  On metformin, she discontinued insulin  Reportedly blood glucose 90s, she has changed the diet was on Lantus before  Has neuropathy  No recent labs  No blood glucose log or meter          Prior history February 2019  She has not been checking the blood glucose . she is sleeping better. She is on Metformin and self stopped Lantus . I have asked her to resume Lantus several times, she is going by the symptoms and since she feels better she is not taking insulin. 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 She has a family history of type 2 diabetes. Wt Readings from Last 3 Encounters:   04/22/22 208 lb (94.3 kg)   05/22/19 226 lb (102.5 kg)   02/11/19 233 lb (105.7 kg)       BP Readings from Last 3 Encounters:   04/22/22 115/81   05/22/19 124/76   02/11/19 127/74           Past Medical History:   Diagnosis Date    Hyperlipidemia     Obesity     Type II or unspecified type diabetes mellitus without mention of complication, uncontrolled      Current Outpatient Medications   Medication Sig    metFORMIN (GLUCOPHAGE) 1,000 mg tablet TAKE 1 TABLET BY MOUTH TWICE A DAY WITH MEALS    multivitamin (ONE A DAY) tablet Take 1 Tab by mouth daily.     clobetasoL (TEMOVATE) 0.05 % ointment APPLY TO THE AFFECTED AREAS AS DIRECTED    hydrOXYzine HCl (ATARAX) 25 mg tablet TAKE 1 TABLET BY MOUTH EVERY night    insulin glargine (LANTUS SOLOSTAR U-100 INSULIN) 100 unit/mL (3 mL) inpn INJECT 35 UNITS EVERY DAY AT 9PM AS DIRECTED. Stop Basaglar    glucose blood VI test strips (TRUE METRIX GLUCOSE TEST STRIP) strip TEST ONCE A DAY    glucose blood VI test strips (TRUE METRIX GLUCOSE TEST STRIP) strip TEST BLOOD GLUCOSE ONCE DAILY Dx Code: E11.65    lancets (TRUEPLUS LANCETS) 33 gauge misc Test blood glucose once daily Dx Code: E11.65    Insulin Needles, Disposable, (ESVIN PEN NEEDLE) 32 gauge x 5/32\" ndle USE TO INJECT LANTUS    Blood-Glucose Meter (TRUE METRIX AIR GLUCOSE METER) monitoring kit Test blood glucose once daily Dx Code: E11.65    OTHER Take 4 Tabs by mouth daily. Bio Strath-     cholecalciferol, VITAMIN D3, (VITAMIN D3) 5,000 unit tab tablet Take  by mouth daily.  lovastatin (MEVACOR) 20 mg tablet TAKE 2 TABLETS BY MOUTH EVERY DAY (Patient not taking: Reported on 4/22/2022)    TURMERIC, BULK, by Does Not Apply route. (Patient not taking: Reported on 4/22/2022)     No current facility-administered medications for this visit. Allergies   Allergen Reactions    Other Medication Swelling     Antibiotics         Review of Systems:  - Per HPI    Physical Examination:   Blood pressure 115/81, pulse 96, temperature 97.6 °F (36.4 °C), temperature source Temporal, resp. rate 18, height 5' 4\" (1.626 m), weight 208 lb (94.3 kg), SpO2 100 %. Estimated body mass index is 35.7 kg/m² as calculated from the following:    Height as of this encounter: 5' 4\" (1.626 m). -   Weight as of this encounter: 208 lb (94.3 kg).   - General: pleasant, no distress, good eye contact  - HEENT: no exophthalmos, no periorbital edema, EOMI  - Neck: No thyromegaly  -   - RS: Normal respiratory effort  - Musculoskeletal: no tremors  - Neurological: alert and oriented  - Psychiatric: normal mood and affect  - Skin: Normal color  Diabetic feet exam ; May 2019    H/o partial or complete amputation of foot : No  H/o previous foot ulceration : No  H/o pre - ulcerative callus : No  H/o peripheral neuropathy and callus : No  H/o poor circulation  : No  Foot deformity : No  -           Lab Results   Component Value Date/Time    Hemoglobin A1c 10.0 (H) 04/22/2022 10:20 AM    Hemoglobin A1c 12.6 (H) 05/22/2019 11:34 AM    Hemoglobin A1c 13.0 (H) 02/04/2019 03:15 PM    Glucose 361 (H) 04/22/2022 10:20 AM    Glucose  12/01/2017 04:01 PM    Microalb/Creat ratio (ug/mg creat.) 54.5 (H) 02/04/2019 03:15 PM    LDL,Direct 147 (H) 04/22/2022 10:20 AM    LDL, calculated 100 (H) 02/04/2019 03:15 PM    Creatinine 0.82 04/22/2022 10:20 AM      Lab Results   Component Value Date/Time    Cholesterol, total 208 (H) 02/04/2019 03:15 PM    HDL Cholesterol 76 02/04/2019 03:15 PM    LDL,Direct 147 (H) 04/22/2022 10:20 AM    LDL, calculated 100 (H) 02/04/2019 03:15 PM    Triglyceride 161 (H) 02/04/2019 03:15 PM     Lab Results   Component Value Date/Time    ALT (SGPT) 17 02/04/2019 03:15 PM    Alk. phosphatase 88 02/04/2019 03:15 PM    Bilirubin, total <0.2 02/04/2019 03:15 PM     Lab Results   Component Value Date/Time    GFR est  02/04/2019 03:15 PM    GFR est non- 02/04/2019 03:15 PM    Creatinine 0.82 04/22/2022 10:20 AM    BUN 12 04/22/2022 10:20 AM    Sodium 134 04/22/2022 10:20 AM    Potassium 5.3 (H) 04/22/2022 10:20 AM    Chloride 94 (L) 04/22/2022 10:20 AM    CO2 23 04/22/2022 10:20 AM      Lab Results   Component Value Date/Time    TSH 2.460 08/31/2016 01:53 PM           Assessment/Plan:     1.  Type 2 Diabetes Mellitus ,  Lab Results   Component Value Date/Time    Hemoglobin A1c 10.0 (H) 04/22/2022 10:20 AM    Hemoglobin A1c (POC) 11.3 12/01/2017 04:01 PM     Uncontrolled diabetes mellitus, severe hyperglycemia  Questionable adherence to medications  She is only on metformin now  Resume insulin  Risk of hypoglycemia is high without monitoring    Counseled again about the complications   metformin 1000 mg BID  Januvia 100 mg in AM - self stopped   Discussed the importance of checking home glucose regularly and taking all of their scheduled medications in order to have the best possible outcome. Reviewed the complications and importance of diet. Discussed about the diet, carbohydrate portion control and increase activity as tolerated. FLU annually ,Pneumovax ,aspirin daily,annual eye exam,microalbumin    2. Obesity:Body mass index is 35.7 kg/m². BMI is out of normal parameters and plan is as follows: I have counseled this patient on diet and exercise regimens. 3. Hyperlipidemia: statin     4. Peripheral neuropathy: Stressed importance of good glycemic control  Gabapentin, side effects discussed    Counseled extensively, discussed the long-term complications, she understands the risk. She is at a very high risk for complications including blindness, renal failure, strokes and CAD      Follow-up and Dispositions    · Return in about 3 months (around 7/22/2022) for labs before next visit and follow up. Thank you for allowing me to participate in the care of this patient. Alondra Sanabria MD    Patient verbalized understanding      Voice-recognition software was used to generate this report, which may result in some phonetic-based errors in the grammar and contents. Even though attempts were made to correct all the mistakes, some may have been missed and remained in the body of the report.

## 2022-04-22 NOTE — PROGRESS NOTES
Dg Middleton is a 46 y.o. female here for   Chief Complaint   Patient presents with    Diabetes     LV 2020       1. Have you been to the ER, urgent care clinic since your last visit? Hospitalized since your last visit? -[facility] for infection in Jan 2022    2. Have you seen or consulted any other health care providers outside of the [address] [place] [address] since your last visit?   Include any pap smears or colon screening.-no

## 2022-04-23 LAB
BUN SERPL-MCNC: 12 MG/DL (ref 6–24)
BUN/CREAT SERPL: 15 (ref 9–23)
CALCIUM SERPL-MCNC: 10.3 MG/DL (ref 8.7–10.2)
CHLORIDE SERPL-SCNC: 94 MMOL/L (ref 96–106)
CO2 SERPL-SCNC: 23 MMOL/L (ref 20–29)
CREAT SERPL-MCNC: 0.82 MG/DL (ref 0.57–1)
EGFR: 85 ML/MIN/1.73
EST. AVERAGE GLUCOSE BLD GHB EST-MCNC: 240 MG/DL
GLUCOSE SERPL-MCNC: 361 MG/DL (ref 65–99)
HBA1C MFR BLD: 10 % (ref 4.8–5.6)
LDLC SERPL DIRECT ASSAY-MCNC: 147 MG/DL (ref 0–99)
POTASSIUM SERPL-SCNC: 5.3 MMOL/L (ref 3.5–5.2)
SODIUM SERPL-SCNC: 134 MMOL/L (ref 134–144)

## 2022-04-23 RX ORDER — LOVASTATIN 20 MG/1
40 TABLET ORAL DAILY
Qty: 180 TABLET | Refills: 3 | Status: SHIPPED | OUTPATIENT
Start: 2022-04-23 | End: 2022-04-25 | Stop reason: SDUPTHER

## 2022-04-25 ENCOUNTER — TELEPHONE (OUTPATIENT)
Dept: ENDOCRINOLOGY | Age: 52
End: 2022-04-25

## 2022-04-25 DIAGNOSIS — E11.65 UNCONTROLLED TYPE 2 DIABETES MELLITUS WITH HYPERGLYCEMIA (HCC): ICD-10-CM

## 2022-04-25 DIAGNOSIS — E11.65 UNCONTROLLED TYPE 2 DIABETES MELLITUS WITH HYPERGLYCEMIA, WITH LONG-TERM CURRENT USE OF INSULIN (HCC): ICD-10-CM

## 2022-04-25 DIAGNOSIS — E11.65 TYPE 2 DIABETES MELLITUS WITH HYPERGLYCEMIA, WITH LONG-TERM CURRENT USE OF INSULIN (HCC): ICD-10-CM

## 2022-04-25 DIAGNOSIS — Z79.4 TYPE 2 DIABETES MELLITUS WITH HYPERGLYCEMIA, WITH LONG-TERM CURRENT USE OF INSULIN (HCC): ICD-10-CM

## 2022-04-25 DIAGNOSIS — Z79.4 UNCONTROLLED TYPE 2 DIABETES MELLITUS WITH HYPERGLYCEMIA, WITH LONG-TERM CURRENT USE OF INSULIN (HCC): ICD-10-CM

## 2022-04-25 DIAGNOSIS — E78.2 MIXED HYPERLIPIDEMIA: ICD-10-CM

## 2022-04-25 RX ORDER — LANCETS 33 GAUGE
EACH MISCELLANEOUS
Qty: 100 LANCET | Refills: 3 | Status: SHIPPED | OUTPATIENT
Start: 2022-04-25

## 2022-04-25 RX ORDER — INSULIN GLARGINE 100 [IU]/ML
INJECTION, SOLUTION SUBCUTANEOUS
Qty: 40 ML | Refills: 3 | Status: SHIPPED | OUTPATIENT
Start: 2022-04-25 | End: 2022-04-25 | Stop reason: SDUPTHER

## 2022-04-25 RX ORDER — METFORMIN HYDROCHLORIDE 1000 MG/1
1000 TABLET ORAL 2 TIMES DAILY WITH MEALS
Qty: 180 TABLET | Refills: 0 | Status: SHIPPED | OUTPATIENT
Start: 2022-04-25 | End: 2022-04-25 | Stop reason: SDUPTHER

## 2022-04-25 RX ORDER — HYDROXYZINE 25 MG/1
TABLET, FILM COATED ORAL
Qty: 90 TABLET | Refills: 3 | Status: SHIPPED | OUTPATIENT
Start: 2022-04-25

## 2022-04-25 RX ORDER — METFORMIN HYDROCHLORIDE 1000 MG/1
1000 TABLET ORAL 2 TIMES DAILY WITH MEALS
Qty: 180 TABLET | Refills: 1 | Status: SHIPPED | OUTPATIENT
Start: 2022-04-25

## 2022-04-25 RX ORDER — PEN NEEDLE, DIABETIC 31 GX3/16"
NEEDLE, DISPOSABLE MISCELLANEOUS
Qty: 100 PEN NEEDLE | Refills: 3 | Status: SHIPPED | OUTPATIENT
Start: 2022-04-25

## 2022-04-25 RX ORDER — INSULIN GLARGINE 100 [IU]/ML
INJECTION, SOLUTION SUBCUTANEOUS
Qty: 40 ML | Refills: 3 | Status: SHIPPED | OUTPATIENT
Start: 2022-04-25

## 2022-04-25 RX ORDER — LOVASTATIN 20 MG/1
40 TABLET ORAL DAILY
Qty: 180 TABLET | Refills: 3 | Status: SHIPPED | OUTPATIENT
Start: 2022-04-25

## 2022-04-25 RX ORDER — CALCIUM CITRATE/VITAMIN D3 200MG-6.25
TABLET ORAL
Qty: 100 STRIP | Refills: 3 | Status: SHIPPED | OUTPATIENT
Start: 2022-04-25

## 2022-04-25 NOTE — TELEPHONE ENCOUNTER
Returned patient call and advised her of Dr. Tyler Fothergill message. Patient verbalized understanding and asked could she get refills on medications.

## 2022-04-25 NOTE — PROGRESS NOTES
Glucose is very high, A1c is 10, glucose was 361 in the office, metformin alone will not help. Resume lantus  Potassium is high . Choose lower potassium foods. High-potassium foods include bananas, oranges, potatoes, spinach and tomatoes. Examples of low-potassium foods include apples, cabbage, carrots, green beans, grapes and strawberries.       Cholesterol is high

## 2022-04-26 ENCOUNTER — TELEPHONE (OUTPATIENT)
Dept: PRIMARY CARE CLINIC | Age: 52
End: 2022-04-26

## 2022-04-26 NOTE — TELEPHONE ENCOUNTER
Gustavo Agrawal [facility] Nurse  Subject: Message to Provider     QUESTIONS   Information for Provider? Patient is requesting ANY CANCELATIONS of ANY   APPTS for her to be called. Patient states she needs to be seen right away   but also stated it was not urgent.   ---------------------------------------------------------------------------   --------------   CALL BACK INFO   What is the best way for the office to contact you? OK to leave message on   voicemail   Preferred Call Back Phone Number? [phone]   ---------------------------------------------------------------------------   --------------   SCRIPT ANSWERS   Relationship to Patient?  Self

## 2022-06-24 ENCOUNTER — HOSPITAL ENCOUNTER (EMERGENCY)
Age: 52
Discharge: HOME | End: 2022-06-24
Attending: EMERGENCY MEDICINE
Payer: MEDICAID

## 2022-06-24 VITALS
SYSTOLIC BLOOD PRESSURE: 117 MMHG | HEIGHT: 64 IN | TEMPERATURE: 97.8 F | BODY MASS INDEX: 36.06 KG/M2 | WEIGHT: 211.2 LBS | DIASTOLIC BLOOD PRESSURE: 76 MMHG | HEART RATE: 86 BPM | OXYGEN SATURATION: 100 % | RESPIRATION RATE: 16 BRPM

## 2022-06-24 DIAGNOSIS — R21 RASH AND OTHER NONSPECIFIC SKIN ERUPTION: Primary | ICD-10-CM

## 2022-06-24 PROCEDURE — 99283 EMERGENCY DEPT VISIT LOW MDM: CPT

## 2022-06-24 RX ORDER — MAG HYDROX/ALUMINUM HYD/SIMETH 200-200-20
SUSPENSION, ORAL (FINAL DOSE FORM) ORAL 2 TIMES DAILY
Qty: 30 G | Refills: 0 | Status: SHIPPED | OUTPATIENT
Start: 2022-06-24

## 2022-06-24 NOTE — ED PROVIDER NOTES
HPI   26-year-old female with a past medical history of poorly controlled diabetes and hyperlipidemia presents to the emergency department due to a rash. Patient developed a rash on her back about 3 weeks ago that started with using an Aveeno soap. She has since stopped using that soap but the rash is continued. She says it is very itchy, especially at night. She has been taking Benadryl which is not helping. She says she has already seen a dermatologist who told her they did not know what the rash was and she was prescribed hydroxyzine which did not help. She says the rash is now going to her arms and up onto the bottom of her scalp. She denies fevers or chills. She denies any oral or vaginal lesions. She denies starting any new medications. She denies any weeping from her wounds. Past Medical History:   Diagnosis Date    Hyperlipidemia     Obesity     Type II or unspecified type diabetes mellitus without mention of complication, uncontrolled        History reviewed. No pertinent surgical history. History reviewed. No pertinent family history. Social History     Socioeconomic History    Marital status:      Spouse name: Not on file    Number of children: Not on file    Years of education: Not on file    Highest education level: Not on file   Occupational History    Not on file   Tobacco Use    Smoking status: Never Smoker    Smokeless tobacco: Never Used   Substance and Sexual Activity    Alcohol use: No    Drug use: Never    Sexual activity: Not on file   Other Topics Concern    Not on file   Social History Narrative    Not on file     Social Determinants of Health     Financial Resource Strain:     Difficulty of Paying Living Expenses: Not on file   Food Insecurity:     Worried About Running Out of Food in the Last Year: Not on file    Chelsie of Food in the Last Year: Not on file   Transportation Needs:     Lack of Transportation (Medical):  Not on file    Lack of Transportation (Non-Medical): Not on file   Physical Activity:     Days of Exercise per Week: Not on file    Minutes of Exercise per Session: Not on file   Stress:     Feeling of Stress : Not on file   Social Connections:     Frequency of Communication with Friends and Family: Not on file    Frequency of Social Gatherings with Friends and Family: Not on file    Attends [employer] Services: Not on file    Active Member of [address] [place] [address] or Organizations: Not on file    Attends Club or Organization Meetings: Not on file    Marital Status: Not on file   Intimate Partner Violence:     Fear of Current or Ex-Partner: Not on file    Emotionally Abused: Not on file    Physically Abused: Not on file    Sexually Abused: Not on file   Housing Stability:     Unable to Pay for Housing in the Last Year: Not on file    Number of [place] in the Last Year: Not on file    Unstable Housing in the Last Year: Not on file         ALLERGIES: Other medication and Sulfa (sulfonamide antibiotics)    Review of Systems  All systems reviewed were negative unless otherwise documented in the HPI. Vitals:    06/24/22 1120 06/24/22 1124 06/24/22 1124   BP:  117/76    Pulse: 86     Resp: 16     Temp: 97.8 °F (36.6 °C)     SpO2: 100%  100%   Weight: 95.8 kg (211 lb 3.2 oz)     Height: 5' 4\" (1.626 m)              Physical Exam  Constitutional:       Comments: Resting comfortably no acute distress   HENT:      Mouth/Throat:      Comments: Moist mucous membranes without any intraoral lesions  Eyes:      General: No scleral icterus. Extraocular Movements: Extraocular movements intact. Neck:      Comments: Trachea midline  Cardiovascular:      Comments: Regular rate and rhythm  Pulmonary:      Effort: Pulmonary effort is normal. No respiratory distress. Musculoskeletal:         General: No deformity. Normal range of motion. Cervical back: Normal range of motion.    Skin:     Comments: Patient has a hyperpigmented nonraised rash on the back and upper extremities. There is some surrounding excoriation but no skin breakdown. No vesicular lesions. No papular lesions. No urticaria. No warmth. Neurological:      Comments: Awake and alert. GCS 15          MDM   61-year-old female who presents with the above chief complaint. Vital signs are stable. She has a hyperpigmented flat rash on her back that is scattered and also involves the upper extremities. There is no erythema. There is no obvious skin breakdown. No vesicular lesions. No evidence of cellulitis. It is unclear to me what the cause of this rash is. It may be tinea versicolor. It does not look like contact dermatitis. Does not appear to be urticaria. Low suspicion for drug reaction. I am going to prescribe the patient some hydrocortisone cream and I have instructed her to get a second opinion from dermatology. Patient understanding and she was discharged in stable condition.     Procedures

## 2022-06-24 NOTE — DISCHARGE INSTRUCTIONS
Return to the emergency department with any worsening symptoms or new concerns. In the meantime you can try the topical steroid and call the number provided to get a second opinion about your rash from dermatology.

## 2022-07-20 ENCOUNTER — HOSPITAL ENCOUNTER (EMERGENCY)
Age: 52
Discharge: HOME | End: 2022-07-20
Attending: STUDENT IN AN ORGANIZED HEALTH CARE EDUCATION/TRAINING PROGRAM
Payer: MEDICAID

## 2022-07-20 VITALS
SYSTOLIC BLOOD PRESSURE: 114 MMHG | HEART RATE: 98 BPM | TEMPERATURE: 97.8 F | OXYGEN SATURATION: 98 % | DIASTOLIC BLOOD PRESSURE: 72 MMHG | RESPIRATION RATE: 18 BRPM

## 2022-07-20 DIAGNOSIS — L03.119 CELLULITIS AND ABSCESS OF LEG, EXCEPT FOOT: ICD-10-CM

## 2022-07-20 DIAGNOSIS — R21 RASH AND OTHER NONSPECIFIC SKIN ERUPTION: Primary | ICD-10-CM

## 2022-07-20 DIAGNOSIS — L02.419 CELLULITIS AND ABSCESS OF LEG, EXCEPT FOOT: ICD-10-CM

## 2022-07-20 PROCEDURE — 99283 EMERGENCY DEPT VISIT LOW MDM: CPT

## 2022-07-20 RX ORDER — CEPHALEXIN 500 MG/1
500 CAPSULE ORAL 4 TIMES DAILY
Qty: 20 CAPSULE | Refills: 0 | Status: SHIPPED | OUTPATIENT
Start: 2022-07-20 | End: 2022-07-25

## 2022-07-20 RX ORDER — HYDROXYZINE 50 MG/1
50 TABLET, FILM COATED ORAL
Qty: 20 TABLET | Refills: 0 | Status: SHIPPED | OUTPATIENT
Start: 2022-07-20 | End: 2022-07-30

## 2022-07-20 NOTE — DISCHARGE INSTRUCTIONS
You presented to ED with rash and body for the last several months. He did have 1 area that appears to have a draining lesion consistent with cellulitic infection. Will prescribe Keflex for 5 days. Follow-up with her dermatologist tomorrow as planned. Information on molluscum contagiosum is in your discharge paperwork. This is a possible diagnosis but by no means definitive. Symptoms change or worsen return to the ED. Follow-up with your PCP.

## 2022-07-20 NOTE — ED NOTES
Pt given discharge instructions, patient education, 2 prescriptions, and follow up information with dermatologist. Pt verbalizes understanding. All questions answered. Patient discharged to home in private vehicle, ambulatory. Pt A&Ox4, RA, pain controlled.

## 2022-07-20 NOTE — ED PROVIDER NOTES
Patient is a 57-year-old female presented ED with chronic rash that is present her legs back and neck for several months. 1 on her left leg is open and draining. Patient is concerned about cellulitis. Patient's rash appears to have proceeded monkey outbreak in [facility]. Doubt monkey pox. Patient denies fevers chills or signs of systemic infection. Patient's rash is pruritic. Patient's been treating it with Merrem. Patient has follow-up with primary care doctor as well as dermatology tomorrow. The history is provided by the patient. Rash   This is a chronic problem. The current episode started more than 1 week ago. The problem has been gradually worsening. The problem is associated with nothing. There has been no fever. The rash is present on the trunk, scalp, left upper leg and back. The pain is at a severity of 0/10. The patient is experiencing no pain. The pain has been Constant since onset. Associated symptoms include blisters and itching. Treatments tried: Tumeric. The treatment provided no relief. Past Medical History:   Diagnosis Date    Hyperlipidemia     Obesity     Type II or unspecified type diabetes mellitus without mention of complication, uncontrolled        History reviewed. No pertinent surgical history. History reviewed. No pertinent family history.     Social History     Socioeconomic History    Marital status:      Spouse name: Not on file    Number of children: Not on file    Years of education: Not on file    Highest education level: Not on file   Occupational History    Not on file   Tobacco Use    Smoking status: Never    Smokeless tobacco: Never   Substance and Sexual Activity    Alcohol use: No    Drug use: Never    Sexual activity: Not on file   Other Topics Concern    Not on file   Social History Narrative    Not on file     Social Determinants of Health     Financial Resource Strain: Not on file   Food Insecurity: Not on file   Transportation Needs: Not on file Physical Activity: Not on file   Stress: Not on file   Social Connections: Not on file   Intimate Partner Violence: Not on file   Housing Stability: Not on file         ALLERGIES: Other medication and Sulfa (sulfonamide antibiotics)    Review of Systems   Constitutional: Negative. HENT: Negative. Eyes: Negative. Respiratory: Negative. Cardiovascular: Negative. Gastrointestinal: Negative. Endocrine: Negative. Genitourinary: Negative. Musculoskeletal: Negative. Skin:  Positive for itching and rash. Allergic/Immunologic: Negative. Neurological: Negative. Hematological: Negative. Psychiatric/Behavioral: Negative. Vitals:    07/20/22 1002 07/20/22 1005 07/20/22 1006   BP:  114/72    Pulse:   98   Resp:   18   SpO2: 98% 98%             Physical Exam  Vitals and nursing note reviewed. Constitutional:       General: She is not in acute distress. Appearance: Normal appearance. HENT:      Head: Normocephalic and atraumatic. Right Ear: External ear normal.      Left Ear: External ear normal.      Nose: Nose normal.   Eyes:      Extraocular Movements: Extraocular movements intact. Conjunctiva/sclera: Conjunctivae normal.   Cardiovascular:      Rate and Rhythm: Normal rate. Pulses: Normal pulses. Radial pulses are 2+ on the right side and 2+ on the left side. Heart sounds: Normal heart sounds. Pulmonary:      Effort: Pulmonary effort is normal.      Breath sounds: Normal breath sounds. Abdominal:      General: Abdomen is flat. There is no distension. Tenderness: There is no abdominal tenderness. Musculoskeletal:         General: No deformity or signs of injury. Normal range of motion. Cervical back: Normal range of motion and neck supple. No tenderness. Skin:     General: Skin is warm and dry. Capillary Refill: Capillary refill takes less than 2 seconds. Findings: Rash present.       Comments: Patient has rash on bilateral legs, lower back and head. See image below. Additionally patient has draining lesion on left leg consistent with draining abscess with possible cellulitic changes   Neurological:      General: No focal deficit present. Mental Status: She is alert and oriented to person, place, and time. Psychiatric:         Attention and Perception: Attention normal.         Mood and Affect: Mood normal.         Behavior: Behavior normal.          MDM         MEDICATIONS GIVEN:  Medications - No data to display    Differential diagnosis: Rash, abscess, cellulitis, molluscum contagiosum, monkey pox    MDM: Patient is a 57-year-old female presenting to the ED with rash for several months. Rash is pruritic. Patient is concerned area of draining lesion on her left leg. Mild cellulitic changes. We will treat with antibiotics. Doubt monkey box based on duration of rash and time course in [facility]. Molluscum is possible. Patient is following up with dermatology tomorrow for definitive diagnosis and treatment. No further emergency medical invention needed at this time. Hydroxyzine prescribed for pruritus    Key discharge instructions and summary of care: You presented to ED with rash and body for the last several months. He did have 1 area that appears to have a draining lesion consistent with cellulitic infection. Will prescribe Keflex for 5 days. Follow-up with her dermatologist tomorrow as planned. Information on molluscum contagiosum is in your discharge paperwork. This is a possible diagnosis but by no means definitive. Symptoms change or worsen return to the ED. Follow-up with your PCP. Patient is stable for discharge. All available radiology and laboratory results have been reviewed with patient and/or available family.   Patient and/or family verbally conveyed their understanding and agreement of the patient's signs, symptoms, diagnosis, treatment and prognosis and additionally agree to follow-up as recommended in the discharge instructions or to return to the Emergency Department should their condition change or worsen prior to their follow-up appointment. All questions have been answered and patient and/or available family express understanding. IMPRESSION:  1. Rash and other nonspecific skin eruption    2. Cellulitis and abscess of leg, except foot        DISPOSITION: Discharged    Byron Hughes MD    Procedures

## 2022-07-21 ENCOUNTER — OFFICE VISIT (OUTPATIENT)
Dept: PRIMARY CARE CLINIC | Age: 52
End: 2022-07-21
Payer: MEDICAID

## 2022-07-21 VITALS
HEART RATE: 80 BPM | OXYGEN SATURATION: 98 % | SYSTOLIC BLOOD PRESSURE: 126 MMHG | HEIGHT: 64 IN | TEMPERATURE: 97.6 F | DIASTOLIC BLOOD PRESSURE: 81 MMHG | WEIGHT: 220.4 LBS | BODY MASS INDEX: 37.63 KG/M2 | RESPIRATION RATE: 20 BRPM

## 2022-07-21 DIAGNOSIS — R21 RASH AND NONSPECIFIC SKIN ERUPTION: Primary | ICD-10-CM

## 2022-07-21 DIAGNOSIS — E78.2 MIXED HYPERLIPIDEMIA: ICD-10-CM

## 2022-07-21 DIAGNOSIS — E66.9 OBESITY (BMI 30-39.9): ICD-10-CM

## 2022-07-21 DIAGNOSIS — E11.21 TYPE 2 DIABETES WITH NEPHROPATHY (HCC): ICD-10-CM

## 2022-07-21 PROCEDURE — 3046F HEMOGLOBIN A1C LEVEL >9.0%: CPT | Performed by: FAMILY MEDICINE

## 2022-07-21 PROCEDURE — 99203 OFFICE O/P NEW LOW 30 MIN: CPT | Performed by: FAMILY MEDICINE

## 2022-07-21 NOTE — PROGRESS NOTES
1. \"Have you been to the ER, urgent care clinic since your last visit? Hospitalized since your last visit? \"  Yes, ER visit for rash/cellulitis    2. \"Have you seen or consulted any other health care providers outside of the [address] [place] [address] since your last visit? \" No     3. For patients aged 39-70: Has the patient had a colonoscopy / FIT/ Cologuard? No      If the patient is female:    4. For patients aged 41-77: Has the patient had a mammogram within the past 2 years? No      5. For patients aged 21-65: Has the patient had a pap smear?  No  Visit Vitals  /81 (BP 1 Location: Left upper arm, BP Patient Position: Sitting, BP Cuff Size: Large adult)   Pulse 80   Temp 97.6 °F (36.4 °C) (Temporal)   Resp 20   Ht 5' 4\" (1.626 m)   Wt 220 lb 6.4 oz (100 kg)   LMP 07/19/2018   SpO2 98%   BMI 37.83 kg/m²     Chief Complaint   Patient presents with    Establish Care

## 2022-07-21 NOTE — PROGRESS NOTES
HPI     Chief Complaint:   Chief Complaint   Patient presents with    [address] is an 46 y.o. female. Patient does not have current PCP. Medical history significant for:   Patient Active Problem List   Diagnosis Code    Type II or unspecified type diabetes mellitus without mention of complication, uncontrolled [id number]    Hyperlipidemia E78.5    Non morbid obesity E66.9    Type 2 diabetes mellitus with hyperglycemia, with long-term current use of insulin (HCC) E11.65, Z79.4    Morbid obesity ([facility]) E66.01    Type 2 diabetes with nephropathy ([facility] [address] E11.21       Concerns for today's visit:    T2DM: not controlled. follows with Dr. Derrick Johnson but has not been compliant with follow up. Seen April 2022, but prior to this, last visit was 2020. She was on insulin but self-discontinued per notes. She currently is taking her medications as directed and reports she is motivated to get back on track. Lab Results   Component Value Date/Time    Hemoglobin A1c 10.0 (H) 04/22/2022 10:20 AM    Hemoglobin A1c (POC) 11.3 12/01/2017 04:01 PM      Dyslipidemia: no recent lipid panel. On lovastatin. No myalgias. Lab Results   Component Value Date/Time    Cholesterol, total 208 (H) 02/04/2019 03:15 PM    HDL Cholesterol 76 02/04/2019 03:15 PM    LDL,Direct 147 (H) 04/22/2022 10:20 AM    LDL, calculated 100 (H) 02/04/2019 03:15 PM    VLDL, calculated 32 02/04/2019 03:15 PM    Triglyceride 161 (H) 02/04/2019 03:15 PM      Recurrent rash: occurring periodically for years. It is a papular rash that varies in location, now located on posterior thorax. Also gets it on thighs. It itches \"severely\". She saw Dermatology and was told \"they don't know what it is\". She denies fever or joint pain. She has an appt with new Dermatologist this afternoon. Allergies - reviewed:    Allergies   Allergen Reactions    Other Medication Swelling     Antibiotics    Sulfa (Sulfonamide Antibiotics) Rash       Past Medical History - reviewed:  Past Medical History:   Diagnosis Date    Hyperlipidemia     Obesity     Type II or unspecified type diabetes mellitus without mention of complication, uncontrolled        Past Surgical History - reviewed:  History reviewed. No pertinent surgical history. Immunizations - reviewed: There is no immunization history on file for this patient. Review of Systems   Review of Systems   Cardiovascular:  Negative for chest pain and palpitations. Musculoskeletal:  Negative for joint pain and myalgias. Skin:  Positive for itching and rash. Endo/Heme/Allergies:  Negative for polydipsia. Does not bruise/bleed easily. Reviewed PmHx, FmHx, SocHx as well as meds and allergies, updated and dated in the chart. Objective     Visit Vitals  /81 (BP 1 Location: Left upper arm, BP Patient Position: Sitting, BP Cuff Size: Large adult)   Pulse 80   Temp 97.6 °F (36.4 °C) (Temporal)   Resp 20   Ht 5' 4\" (1.626 m)   Wt 220 lb 6.4 oz (100 kg)   LMP 07/19/2018   SpO2 98%   BMI 37.83 kg/m²       Physical Exam  Vitals and nursing note reviewed. Constitutional:       General: She is not in acute distress. Cardiovascular:      Rate and Rhythm: Normal rate and regular rhythm. Pulmonary:      Effort: Pulmonary effort is normal. No respiratory distress. Breath sounds: Normal breath sounds. Skin:     Comments: Multiple hyperpigmented papules without erythema or drainage. No umbilication noted but multiple appear to have been open/scabbed. These are distributed on upper back and left lateral thigh. No vesicles. Neurological:      Mental Status: She is alert. Assessment and Plan     Diagnoses and all orders for this visit:    1. Rash and nonspecific skin eruption  Comments:  Unclear etiology. Labs as noted and will await further work up as patient has Dermatology appt this afternoon. She will have them fwd notes to me.    Orders:  -     CBC WITH AUTOMATED DIFF  -     SED RATE (ESR)    2. Type 2 diabetes with nephropathy (HCC)  Comments: Following with Endocrinology. we discussed importance of med compliance and follow up. She declines dietician. 3. Obesity (BMI 30-39. 9)  Comments:  Healthy lifestyle discused and encouraged. Increse activity and moderate intensity exercise. 4. Mixed hyperlipidemia  Comments:  Continue statin. Encouraged heart healthy diet. Follow-up and Dispositions    Return in about 3 months (around 11/2/2022) for DM, HLD. I discussed the aforementioned diagnoses with the patient as well as the plan of care. All questions were answered and an AVS was provided.        Dane Anthony MD  [facility]  [id number], [facility], [address] [place] [address]

## 2022-07-22 LAB
BASOPHILS # BLD AUTO: 0 X10E3/UL (ref 0–0.2)
BASOPHILS NFR BLD AUTO: 0 %
EOSINOPHIL # BLD AUTO: 0.3 X10E3/UL (ref 0–0.4)
EOSINOPHIL NFR BLD AUTO: 3 %
ERYTHROCYTE [DISTWIDTH] IN BLOOD BY AUTOMATED COUNT: 11.6 % (ref 11.7–15.4)
ERYTHROCYTE [SEDIMENTATION RATE] IN BLOOD BY WESTERGREN METHOD: 15 MM/HR (ref 0–40)
HCT VFR BLD AUTO: 38 % (ref 34–46.6)
HGB BLD-MCNC: 12.4 G/DL (ref 11.1–15.9)
IMM GRANULOCYTES # BLD AUTO: 0 X10E3/UL (ref 0–0.1)
IMM GRANULOCYTES NFR BLD AUTO: 0 %
LYMPHOCYTES # BLD AUTO: 3 X10E3/UL (ref 0.7–3.1)
LYMPHOCYTES NFR BLD AUTO: 32 %
MCH RBC QN AUTO: 29.5 PG (ref 26.6–33)
MCHC RBC AUTO-ENTMCNC: 32.6 G/DL (ref 31.5–35.7)
MCV RBC AUTO: 90 FL (ref 79–97)
MONOCYTES # BLD AUTO: 0.6 X10E3/UL (ref 0.1–0.9)
MONOCYTES NFR BLD AUTO: 7 %
NEUTROPHILS # BLD AUTO: 5.3 X10E3/UL (ref 1.4–7)
NEUTROPHILS NFR BLD AUTO: 58 %
PLATELET # BLD AUTO: 359 X10E3/UL (ref 150–450)
RBC # BLD AUTO: 4.21 X10E6/UL (ref 3.77–5.28)
WBC # BLD AUTO: 9.2 X10E3/UL (ref 3.4–10.8)

## 2022-07-22 NOTE — PROGRESS NOTES
Sur[employer] message sent:    [facility] [place],    Your labs were within acceptable limits. Inflammatory marker was normal.    How was your dermatology appointment yesterday? Thank you for the privilege to participate in your healthcare and let me know should you have any questions.      Dr. Brady Pleitez

## 2022-10-29 LAB
BUN SERPL-MCNC: 11 MG/DL (ref 6–24)
BUN/CREAT SERPL: 15 (ref 9–23)
CALCIUM SERPL-MCNC: 9.6 MG/DL (ref 8.7–10.2)
CHLORIDE SERPL-SCNC: 100 MMOL/L (ref 96–106)
CO2 SERPL-SCNC: 24 MMOL/L (ref 20–29)
CREAT SERPL-MCNC: 0.75 MG/DL (ref 0.57–1)
EGFR: 95 ML/MIN/1.73
EST. AVERAGE GLUCOSE BLD GHB EST-MCNC: 235 MG/DL
GLUCOSE SERPL-MCNC: 196 MG/DL (ref 70–99)
HBA1C MFR BLD: 9.8 % (ref 4.8–5.6)
POTASSIUM SERPL-SCNC: 4.9 MMOL/L (ref 3.5–5.2)
SODIUM SERPL-SCNC: 138 MMOL/L (ref 134–144)

## 2022-12-21 DIAGNOSIS — E11.65 UNCONTROLLED TYPE 2 DIABETES MELLITUS WITH HYPERGLYCEMIA, WITH LONG-TERM CURRENT USE OF INSULIN (HCC): ICD-10-CM

## 2022-12-21 DIAGNOSIS — E11.65 UNCONTROLLED TYPE 2 DIABETES MELLITUS WITH HYPERGLYCEMIA (HCC): ICD-10-CM

## 2022-12-21 DIAGNOSIS — Z79.4 UNCONTROLLED TYPE 2 DIABETES MELLITUS WITH HYPERGLYCEMIA, WITH LONG-TERM CURRENT USE OF INSULIN (HCC): ICD-10-CM

## 2022-12-21 RX ORDER — HYDROXYZINE 25 MG/1
TABLET, FILM COATED ORAL
Qty: 90 TABLET | Refills: 3 | OUTPATIENT
Start: 2022-12-21

## 2023-02-08 DIAGNOSIS — Z79.4 TYPE 2 DIABETES MELLITUS WITH HYPERGLYCEMIA, WITH LONG-TERM CURRENT USE OF INSULIN (HCC): ICD-10-CM

## 2023-02-08 DIAGNOSIS — Z79.4 UNCONTROLLED TYPE 2 DIABETES MELLITUS WITH HYPERGLYCEMIA, WITH LONG-TERM CURRENT USE OF INSULIN (HCC): ICD-10-CM

## 2023-02-08 DIAGNOSIS — E11.65 UNCONTROLLED TYPE 2 DIABETES MELLITUS WITH HYPERGLYCEMIA, WITH LONG-TERM CURRENT USE OF INSULIN (HCC): ICD-10-CM

## 2023-02-08 DIAGNOSIS — E11.65 UNCONTROLLED TYPE 2 DIABETES MELLITUS WITH HYPERGLYCEMIA (HCC): ICD-10-CM

## 2023-02-08 DIAGNOSIS — E78.2 MIXED HYPERLIPIDEMIA: ICD-10-CM

## 2023-02-08 DIAGNOSIS — E11.65 TYPE 2 DIABETES MELLITUS WITH HYPERGLYCEMIA, WITH LONG-TERM CURRENT USE OF INSULIN (HCC): ICD-10-CM

## 2023-02-08 RX ORDER — METFORMIN HYDROCHLORIDE 1000 MG/1
TABLET ORAL
Qty: 60 TABLET | Refills: 2 | Status: SHIPPED | OUTPATIENT
Start: 2023-02-08 | End: 2023-02-09 | Stop reason: SDUPTHER

## 2023-02-09 ENCOUNTER — OFFICE VISIT (OUTPATIENT)
Dept: ENDOCRINOLOGY | Age: 53
End: 2023-02-09
Payer: MEDICAID

## 2023-02-09 VITALS
WEIGHT: 221 LBS | SYSTOLIC BLOOD PRESSURE: 151 MMHG | RESPIRATION RATE: 18 BRPM | HEART RATE: 86 BPM | HEIGHT: 64 IN | TEMPERATURE: 98.2 F | BODY MASS INDEX: 37.73 KG/M2 | DIASTOLIC BLOOD PRESSURE: 85 MMHG | OXYGEN SATURATION: 98 %

## 2023-02-09 DIAGNOSIS — E11.65 UNCONTROLLED TYPE 2 DIABETES MELLITUS WITH HYPERGLYCEMIA, WITH LONG-TERM CURRENT USE OF INSULIN (HCC): ICD-10-CM

## 2023-02-09 DIAGNOSIS — Z79.4 UNCONTROLLED TYPE 2 DIABETES MELLITUS WITH HYPERGLYCEMIA, WITH LONG-TERM CURRENT USE OF INSULIN (HCC): ICD-10-CM

## 2023-02-09 DIAGNOSIS — Z79.4 TYPE 2 DIABETES MELLITUS WITH HYPERGLYCEMIA, WITH LONG-TERM CURRENT USE OF INSULIN (HCC): Primary | ICD-10-CM

## 2023-02-09 DIAGNOSIS — E11.65 UNCONTROLLED TYPE 2 DIABETES MELLITUS WITH HYPERGLYCEMIA (HCC): ICD-10-CM

## 2023-02-09 DIAGNOSIS — E11.65 TYPE 2 DIABETES MELLITUS WITH HYPERGLYCEMIA, WITH LONG-TERM CURRENT USE OF INSULIN (HCC): Primary | ICD-10-CM

## 2023-02-09 DIAGNOSIS — E78.2 MIXED HYPERLIPIDEMIA: ICD-10-CM

## 2023-02-09 PROCEDURE — 99215 OFFICE O/P EST HI 40 MIN: CPT | Performed by: INTERNAL MEDICINE

## 2023-02-09 RX ORDER — BLOOD-GLUCOSE METER
EACH MISCELLANEOUS
Qty: 1 KIT | Refills: 0 | Status: SHIPPED | OUTPATIENT
Start: 2023-02-09

## 2023-02-09 RX ORDER — METFORMIN HYDROCHLORIDE 1000 MG/1
1000 TABLET ORAL 2 TIMES DAILY WITH MEALS
Qty: 60 TABLET | Refills: 11 | Status: SHIPPED | OUTPATIENT
Start: 2023-02-09

## 2023-02-09 RX ORDER — LANCETS 33 GAUGE
EACH MISCELLANEOUS
Qty: 100 LANCET | Refills: 11 | Status: SHIPPED | OUTPATIENT
Start: 2023-02-09

## 2023-02-09 RX ORDER — CALCIUM CITRATE/VITAMIN D3 200MG-6.25
TABLET ORAL
Qty: 100 STRIP | Refills: 11 | Status: SHIPPED | OUTPATIENT
Start: 2023-02-09

## 2023-02-09 RX ORDER — INSULIN GLARGINE 100 [IU]/ML
INJECTION, SOLUTION SUBCUTANEOUS
Qty: 20 ML | Refills: 11 | Status: SHIPPED | OUTPATIENT
Start: 2023-02-09

## 2023-02-09 NOTE — PROGRESS NOTES
Nayana Toney MD    Patient Information  Date:2/9/2023  Name : Kristina Green 45 y.o.     YOB: 1970         Referred by: Self referred    Chief Complaint   Patient presents with    Diabetes       History of Present Illness: Kristina Green is a 45 y.o. female here for follow up    2/9/23  Not checking BG - meter broke  Last A1c from October 2022 close to 10, history poorly controlled diabetes  COVID 4 weeks ago  Taking insulin the last 2 months, in October she was not on insulin. Started walking, has reduced her work hours  Seen podiatry  No FHX of thyroid or pancreatic CA  Wants Ozempic  Eye exam - appt next week    Prior history  Last visit was 2 years ago in 2020  Had abscess due to severe hyperglycemia, s/p surgery  On metformin, she discontinued insulin  Reportedly blood glucose 90s, she has changed the diet was on Lantus before  Has neuropathy  No recent labs  No blood glucose log or meter          Prior history February 2019  She has not been checking the blood glucose . she is sleeping better. She is on Metformin and self stopped Lantus . I have asked her to resume Lantus several times, she is going by the symptoms and since she feels better she is not taking insulin. 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 She has a family history of type 2 diabetes.         Wt Readings from Last 3 Encounters:   07/21/22 220 lb 6.4 oz (100 kg)   06/24/22 211 lb 3.2 oz (95.8 kg)   04/22/22 208 lb (94.3 kg)       BP Readings from Last 3 Encounters:   07/21/22 126/81   07/20/22 114/72   06/24/22 117/76           Past Medical History:   Diagnosis Date    Hyperlipidemia     Obesity     Type II or unspecified type diabetes mellitus without mention of complication, uncontrolled      Current Outpatient Medications   Medication Sig    metFORMIN (GLUCOPHAGE) 1,000 mg tablet TAKE 1 TABLET BY MOUTH TWICE A DAY WITH MEALS    hydrocortisone (HYCORT) 1 % ointment Apply  to affected area two (2) times a day. use thin layer (Patient not taking: No sig reported)    lancets (TRUEplus Lancets) 33 gauge misc Test blood glucose once daily Dx Code: E11.65    Insulin Needles, Disposable, (Zenaida Pen Needle) 32 gauge x 5/32\" ndle USE TO INJECT LANTUS    glucose blood VI test strips (True Metrix Glucose Test Strip) strip TEST BLOOD GLUCOSE ONCE DAILY Dx Code: E11.65    lovastatin (MEVACOR) 20 mg tablet Take 2 Tablets by mouth daily. (Patient not taking: No sig reported)    insulin glargine (Lantus Solostar U-100 Insulin) 100 unit/mL (3 mL) inpn INJECT 35 UNITS EVERY DAY AT 9PM AS DIRECTED. Stop Basaglar    hydrOXYzine HCL (ATARAX) 25 mg tablet TAKE 1 TABLET BY MOUTH EVERY night (Patient not taking: No sig reported)    multivitamin (ONE A DAY) tablet Take 1 Tab by mouth daily. clobetasoL (TEMOVATE) 0.05 % ointment APPLY TO THE AFFECTED AREAS AS DIRECTED (Patient not taking: No sig reported)    glucose blood VI test strips (TRUE METRIX GLUCOSE TEST STRIP) strip TEST ONCE A DAY    Blood-Glucose Meter (TRUE METRIX AIR GLUCOSE METER) monitoring kit Test blood glucose once daily Dx Code: E11.65    TURMERIC, BULK, by Does Not Apply route. OTHER Take 4 Tabs by mouth daily. Bio Strath-     cholecalciferol, VITAMIN D3, (VITAMIN D3) 5,000 unit tab tablet Take  by mouth daily. No current facility-administered medications for this visit. Allergies   Allergen Reactions    Other Medication Swelling     Antibiotics    Sulfa (Sulfonamide Antibiotics) Rash         Review of Systems:  Per HPI    Physical Examination:   Last menstrual period 07/19/2018.  Estimated body mass index is 37.83 kg/m² as calculated from the following: Height as of 7/21/22: 5' 4\" (1.626 m). Weight as of 7/21/22: 220 lb 6.4 oz (100 kg). General: pleasant, no distress, good eye contact  HEENT: no exophthalmos, no periorbital edema, EOMI  Neck: No thyromegaly    RS: Normal respiratory effort  Musculoskeletal: no tremors  Neurological: alert and oriented  Psychiatric: normal mood and affect  Skin: Normal color  Diabetic feet exam ; May 2019    H/o partial or complete amputation of foot : No  H/o previous foot ulceration : No  H/o pre - ulcerative callus : No  H/o peripheral neuropathy and callus : No  H/o poor circulation  : No  Foot deformity : No            Lab Results   Component Value Date/Time    Hemoglobin A1c 9.8 (H) 10/28/2022 12:00 AM    Hemoglobin A1c 10.0 (H) 04/22/2022 10:20 AM    Hemoglobin A1c 12.6 (H) 05/22/2019 11:34 AM    Glucose 196 (H) 10/28/2022 12:00 AM    Glucose  12/01/2017 04:01 PM    Microalb/Creat ratio (ug/mg creat.) 54.5 (H) 02/04/2019 03:15 PM    LDL,Direct 147 (H) 04/22/2022 10:20 AM    LDL, calculated 100 (H) 02/04/2019 03:15 PM    Creatinine 0.75 10/28/2022 12:00 AM      Lab Results   Component Value Date/Time    Cholesterol, total 208 (H) 02/04/2019 03:15 PM    HDL Cholesterol 76 02/04/2019 03:15 PM    LDL,Direct 147 (H) 04/22/2022 10:20 AM    LDL, calculated 100 (H) 02/04/2019 03:15 PM    Triglyceride 161 (H) 02/04/2019 03:15 PM     Lab Results   Component Value Date/Time    ALT (SGPT) 17 02/04/2019 03:15 PM    Alk.  phosphatase 88 02/04/2019 03:15 PM    Bilirubin, total <0.2 02/04/2019 03:15 PM     Lab Results   Component Value Date/Time    GFR est  02/04/2019 03:15 PM    GFR est non- 02/04/2019 03:15 PM    Creatinine 0.75 10/28/2022 12:00 AM    BUN 11 10/28/2022 12:00 AM    Sodium 138 10/28/2022 12:00 AM    Potassium 4.9 10/28/2022 12:00 AM    Chloride 100 10/28/2022 12:00 AM    CO2 24 10/28/2022 12:00 AM      Lab Results   Component Value Date/Time    TSH 2.460 08/31/2016 01:53 PM           Assessment/Plan: 1. Type 2 Diabetes Mellitus ,  Lab Results   Component Value Date/Time    Hemoglobin A1c 9.8 (H) 10/28/2022 12:00 AM    Hemoglobin A1c (POC) 11.3 12/01/2017 04:01 PM     Uncontrolled diabetes mellitus, patient wants A1c to be postponed  Stressed adherence to the medications  Metformin  Lantus  Trulicity, side effects discussed  Meter sent to pharmacy, stressed the importance of checking the blood glucose. Risk of hypoglycemia is high without monitoring    Counseled again about the complications    Januvia 100 mg in AM - self stopped   Discussed the importance of checking home glucose regularly and taking all of their scheduled medications in order to have the best possible outcome. Reviewed the complications and importance of diet. Discussed about the diet, carbohydrate portion control and increase activity as tolerated. FLU annually ,Pneumovax ,aspirin daily,annual eye exam,microalbumin    2. Obesity:There is no height or weight on file to calculate BMI. BMI is out of normal parameters and plan is as follows: I have counseled this patient on diet and exercise regimens. 3. Hyperlipidemia: statin     4. Peripheral neuropathy: Stressed importance of good glycemic control  Gabapentin, side effects discussed    Counseled extensively, discussed the long-term complications, she understands the risk. She is at a very high risk for complications including blindness, renal failure, strokes and CAD        Spent > 40 minutes on the day of the visit reviewing chart, examining, ordering/reviewing labs, counseling, discussing therapeutics and documentation in the medical record    Thank you for allowing me to participate in the care of this patient. Arian Panda MD    Patient verbalized understanding      Voice-recognition software was used to generate this report, which may result in some phonetic-based errors in the grammar and contents.   Even though attempts were made to correct all the mistakes, some may have been missed and remained in the body of the report.

## 2023-02-09 NOTE — PROGRESS NOTES
Mahesh Matt is a 45 y.o. female here for   Chief Complaint   Patient presents with    Diabetes       1. Have you been to the ER or an urgent care clinic since your last visit?  - Susie - suspected COVID     2. Have you been hospitalized since your last visit? - yes [facility] w/ COVID    3. Have you seen or consulted any other health care providers outside of the [address] [place] [address] since your last visit?   Include any pap smears or colon screening.- no

## 2023-02-09 NOTE — PATIENT INSTRUCTIONS
Start Trulicity 1.64 mg weekly    If BG are high >200 then continue 36 units of Lantus.  If BG are improving decrease by 5 units at a time    If BG are at goal consistently take 15 units of Lantus

## 2023-02-27 ENCOUNTER — TELEPHONE (OUTPATIENT)
Dept: ENDOCRINOLOGY | Age: 53
End: 2023-02-27

## 2023-02-27 NOTE — TELEPHONE ENCOUNTER
Spoke with pt who informed me that the initial meter she received was faulty. She returned the meter and was instructed to go to the [facility] to  [facility]. [facility] not [facility] the new [facility]. I advised her to call her insurance to obtain another meter.

## 2023-03-07 DIAGNOSIS — E11.65 UNCONTROLLED TYPE 2 DIABETES MELLITUS WITH HYPERGLYCEMIA (HCC): ICD-10-CM

## 2023-03-07 DIAGNOSIS — Z79.4 UNCONTROLLED TYPE 2 DIABETES MELLITUS WITH HYPERGLYCEMIA, WITH LONG-TERM CURRENT USE OF INSULIN (HCC): ICD-10-CM

## 2023-03-07 DIAGNOSIS — E11.65 UNCONTROLLED TYPE 2 DIABETES MELLITUS WITH HYPERGLYCEMIA, WITH LONG-TERM CURRENT USE OF INSULIN (HCC): ICD-10-CM

## 2023-03-08 RX ORDER — HYDROXYZINE 25 MG/1
TABLET, FILM COATED ORAL
Qty: 90 TABLET | Refills: 3 | OUTPATIENT
Start: 2023-03-08

## 2023-04-22 DIAGNOSIS — Z79.4 TYPE 2 DIABETES MELLITUS WITH HYPERGLYCEMIA, WITH LONG-TERM CURRENT USE OF INSULIN (HCC): Primary | ICD-10-CM

## 2023-04-22 DIAGNOSIS — E78.2 MIXED HYPERLIPIDEMIA: ICD-10-CM

## 2023-04-22 DIAGNOSIS — E11.65 TYPE 2 DIABETES MELLITUS WITH HYPERGLYCEMIA, WITH LONG-TERM CURRENT USE OF INSULIN (HCC): Primary | ICD-10-CM

## 2023-04-23 DIAGNOSIS — Z79.4 TYPE 2 DIABETES MELLITUS WITH HYPERGLYCEMIA, WITH LONG-TERM CURRENT USE OF INSULIN (HCC): Primary | ICD-10-CM

## 2023-04-23 DIAGNOSIS — E78.2 MIXED HYPERLIPIDEMIA: ICD-10-CM

## 2023-04-23 DIAGNOSIS — E11.65 TYPE 2 DIABETES MELLITUS WITH HYPERGLYCEMIA, WITH LONG-TERM CURRENT USE OF INSULIN (HCC): Primary | ICD-10-CM

## 2023-04-24 DIAGNOSIS — Z79.4 TYPE 2 DIABETES MELLITUS WITH HYPERGLYCEMIA, WITH LONG-TERM CURRENT USE OF INSULIN (HCC): Primary | ICD-10-CM

## 2023-04-24 DIAGNOSIS — E11.65 TYPE 2 DIABETES MELLITUS WITH HYPERGLYCEMIA, WITH LONG-TERM CURRENT USE OF INSULIN (HCC): Primary | ICD-10-CM

## 2023-04-24 DIAGNOSIS — E78.2 MIXED HYPERLIPIDEMIA: ICD-10-CM

## 2023-05-18 DIAGNOSIS — E11.65 TYPE 2 DIABETES MELLITUS WITH HYPERGLYCEMIA, WITH LONG-TERM CURRENT USE OF INSULIN (HCC): Primary | ICD-10-CM

## 2023-05-18 DIAGNOSIS — Z79.4 TYPE 2 DIABETES MELLITUS WITH HYPERGLYCEMIA, WITH LONG-TERM CURRENT USE OF INSULIN (HCC): Primary | ICD-10-CM

## 2023-05-18 RX ORDER — HYDROXYZINE HYDROCHLORIDE 25 MG/1
TABLET, FILM COATED ORAL
Qty: 30 TABLET | Refills: 3 | OUTPATIENT
Start: 2023-05-18

## 2023-06-07 DIAGNOSIS — Z79.4 TYPE 2 DIABETES MELLITUS WITH HYPERGLYCEMIA, WITH LONG-TERM CURRENT USE OF INSULIN (HCC): Primary | ICD-10-CM

## 2023-06-07 DIAGNOSIS — E78.2 MIXED HYPERLIPIDEMIA: ICD-10-CM

## 2023-06-07 DIAGNOSIS — E11.65 TYPE 2 DIABETES MELLITUS WITH HYPERGLYCEMIA, WITH LONG-TERM CURRENT USE OF INSULIN (HCC): Primary | ICD-10-CM

## 2023-06-09 RX ORDER — ROSUVASTATIN CALCIUM 20 MG/1
20 TABLET, COATED ORAL NIGHTLY
Qty: 30 TABLET | Refills: 3 | Status: SHIPPED | OUTPATIENT
Start: 2023-06-09

## 2023-06-09 RX ORDER — ROSUVASTATIN CALCIUM 20 MG/1
20 TABLET, COATED ORAL NIGHTLY
Qty: 30 TABLET | Refills: 3 | OUTPATIENT
Start: 2023-06-09

## 2023-06-29 DIAGNOSIS — Z79.4 TYPE 2 DIABETES MELLITUS WITH HYPERGLYCEMIA, WITH LONG-TERM CURRENT USE OF INSULIN (HCC): Primary | ICD-10-CM

## 2023-06-29 DIAGNOSIS — E11.65 TYPE 2 DIABETES MELLITUS WITH HYPERGLYCEMIA, WITH LONG-TERM CURRENT USE OF INSULIN (HCC): Primary | ICD-10-CM

## 2023-06-30 RX ORDER — HYDROXYZINE HYDROCHLORIDE 25 MG/1
25 TABLET, FILM COATED ORAL
Qty: 30 TABLET | Refills: 0 | OUTPATIENT
Start: 2023-06-30

## 2023-07-07 LAB — HBA1C MFR BLD HPLC: 7 %

## 2023-08-01 RX ORDER — HYDROXYZINE HYDROCHLORIDE 25 MG/1
TABLET, FILM COATED ORAL
Qty: 30 TABLET | Refills: 35 | OUTPATIENT
Start: 2023-08-01

## 2023-08-17 ENCOUNTER — OFFICE VISIT (OUTPATIENT)
Dept: PRIMARY CARE CLINIC | Facility: CLINIC | Age: 53
End: 2023-08-17
Payer: MEDICAID

## 2023-08-17 VITALS
RESPIRATION RATE: 20 BRPM | HEART RATE: 93 BPM | HEIGHT: 64 IN | OXYGEN SATURATION: 99 % | SYSTOLIC BLOOD PRESSURE: 105 MMHG | WEIGHT: 200 LBS | TEMPERATURE: 98.6 F | BODY MASS INDEX: 34.15 KG/M2 | DIASTOLIC BLOOD PRESSURE: 76 MMHG

## 2023-08-17 DIAGNOSIS — E66.09 CLASS 1 OBESITY DUE TO EXCESS CALORIES WITH SERIOUS COMORBIDITY AND BODY MASS INDEX (BMI) OF 34.0 TO 34.9 IN ADULT: ICD-10-CM

## 2023-08-17 DIAGNOSIS — S10.86XA TICK BITE OF OTHER PART OF NECK, INITIAL ENCOUNTER: ICD-10-CM

## 2023-08-17 DIAGNOSIS — E11.610 DIABETIC CHARCOT FOOT (HCC): ICD-10-CM

## 2023-08-17 DIAGNOSIS — Z11.59 ENCOUNTER FOR HEPATITIS C SCREENING TEST FOR LOW RISK PATIENT: ICD-10-CM
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
## 2023-08-17 DIAGNOSIS — E78.5 HYPERLIPIDEMIA, UNSPECIFIED HYPERLIPIDEMIA TYPE: ICD-10-CM

## 2023-08-17 DIAGNOSIS — Z79.4 TYPE 2 DIABETES MELLITUS WITH HYPERGLYCEMIA, WITH LONG-TERM CURRENT USE OF INSULIN (HCC): Primary | ICD-10-CM

## 2023-08-17 DIAGNOSIS — E11.65 TYPE 2 DIABETES MELLITUS WITH HYPERGLYCEMIA, WITH LONG-TERM CURRENT USE OF INSULIN (HCC): Primary | ICD-10-CM

## 2023-08-17 DIAGNOSIS — G47.00 INSOMNIA, UNSPECIFIED TYPE: ICD-10-CM

## 2023-08-17 PROBLEM — E66.811 CLASS 1 OBESITY DUE TO EXCESS CALORIES WITH SERIOUS COMORBIDITY AND BODY MASS INDEX (BMI) OF 34.0 TO 34.9 IN ADULT: Status: ACTIVE | Noted: 2023-08-17

## 2023-08-17 PROBLEM — E11.21 TYPE 2 DIABETES WITH NEPHROPATHY (HCC): Status: RESOLVED | Noted: 2019-05-22 | Resolved: 2023-08-17

## 2023-08-17 PROCEDURE — 99214 OFFICE O/P EST MOD 30 MIN: CPT | Performed by: FAMILY MEDICINE

## 2023-08-17 RX ORDER — LANOLIN ALCOHOL/MO/W.PET/CERES
1000 CREAM (GRAM) TOPICAL DAILY
COMMUNITY

## 2023-08-17 RX ORDER — HYDROXYZINE HYDROCHLORIDE 25 MG/1
25 TABLET, FILM COATED ORAL NIGHTLY
Qty: 30 TABLET | Refills: 3 | Status: SHIPPED | OUTPATIENT
Start: 2023-08-17

## 2023-08-17 RX ORDER — DOXYCYCLINE HYCLATE 100 MG
100 TABLET ORAL 2 TIMES DAILY
Qty: 20 TABLET | Refills: 0 | Status: SHIPPED | OUTPATIENT
Start: 2023-08-17 | End: 2023-08-27

## 2023-08-17 SDOH — ECONOMIC STABILITY: HOUSING INSECURITY
IN THE LAST 12 MONTHS, WAS THERE A TIME WHEN YOU DID NOT HAVE A STEADY PLACE TO SLEEP OR SLEPT IN A SHELTER (INCLUDING NOW)?: NO

## 2023-08-17 SDOH — ECONOMIC STABILITY: FOOD INSECURITY: WITHIN THE PAST 12 MONTHS, YOU WORRIED THAT YOUR FOOD WOULD RUN OUT BEFORE YOU GOT MONEY TO BUY MORE.: NEVER TRUE

## 2023-08-17 SDOH — ECONOMIC STABILITY: INCOME INSECURITY: HOW HARD IS IT FOR YOU TO PAY FOR THE VERY BASICS LIKE FOOD, HOUSING, MEDICAL CARE, AND HEATING?: NOT HARD AT ALL

## 2023-08-17 SDOH — ECONOMIC STABILITY: FOOD INSECURITY: WITHIN THE PAST 12 MONTHS, THE FOOD YOU BOUGHT JUST DIDN'T LAST AND YOU DIDN'T HAVE MONEY TO GET MORE.: NEVER TRUE

## 2023-08-18 LAB
ALBUMIN SERPL-MCNC: 4.8 G/DL (ref 3.8–4.9)
ALBUMIN/GLOB SERPL: 1.4 {RATIO} (ref 1.2–2.2)
ALP SERPL-CCNC: 99 IU/L (ref 44–121)
ALT SERPL-CCNC: 8 IU/L (ref 0–32)
AST SERPL-CCNC: 20 IU/L (ref 0–40)
BILIRUB SERPL-MCNC: 0.3 MG/DL (ref 0–1.2)
BUN SERPL-MCNC: 16 MG/DL (ref 6–24)
BUN/CREAT SERPL: 21 (ref 9–23)
CALCIUM SERPL-MCNC: 10.3 MG/DL (ref 8.7–10.2)
CHLORIDE SERPL-SCNC: 98 MMOL/L (ref 96–106)
CHOLEST SERPL-MCNC: 224 MG/DL (ref 100–199)
CO2 SERPL-SCNC: 21 MMOL/L (ref 20–29)
CREAT SERPL-MCNC: 0.76 MG/DL (ref 0.57–1)
EGFRCR SERPLBLD CKD-EPI 2021: 93 ML/MIN/1.73
ERYTHROCYTE [DISTWIDTH] IN BLOOD BY AUTOMATED COUNT: 12.6 % (ref 11.7–15.4)
GLOBULIN SER CALC-MCNC: 3.5 G/DL (ref 1.5–4.5)
GLUCOSE SERPL-MCNC: 161 MG/DL (ref 70–99)
HBA1C MFR BLD: 9.4 % (ref 4.8–5.6)
HCT VFR BLD AUTO: 43.7 % (ref 34–46.6)
HCV IGG SERPL QL IA: NON REACTIVE
HDLC SERPL-MCNC: 72 MG/DL
HGB BLD-MCNC: 14.4 G/DL (ref 11.1–15.9)
LDLC SERPL CALC-MCNC: 119 MG/DL (ref 0–99)
MCH RBC QN AUTO: 28.5 PG (ref 26.6–33)
MCHC RBC AUTO-ENTMCNC: 33 G/DL (ref 31.5–35.7)
MCV RBC AUTO: 87 FL (ref 79–97)
PLATELET # BLD AUTO: 314 X10E3/UL (ref 150–450)
POTASSIUM SERPL-SCNC: 4.5 MMOL/L (ref 3.5–5.2)
PROT SERPL-MCNC: 8.3 G/DL (ref 6–8.5)
RBC # BLD AUTO: 5.05 X10E6/UL (ref 3.77–5.28)
SODIUM SERPL-SCNC: 139 MMOL/L (ref 134–144)
TRIGL SERPL-MCNC: 192 MG/DL (ref 0–149)
VLDLC SERPL CALC-MCNC: 33 MG/DL (ref 5–40)
WBC # BLD AUTO: 9.7 X10E3/UL (ref 3.4–10.8)

## 2023-08-28 ENCOUNTER — TELEPHONE (OUTPATIENT)
Dept: PRIMARY CARE CLINIC | Facility: CLINIC | Age: 53
End: 2023-08-28

## 2023-08-28 NOTE — TELEPHONE ENCOUNTER
----- Message from Niyamarielena Kee sent at 8/28/2023  1:20 PM EDT -----  Subject: Message to Provider    QUESTIONS  Information for Provider? Henna Krishna called from [address] in   regards to pt continuity of care. She needs codes that will be used for   follow up visits w/ Dr. Jelena Felton from 10/1/23 - 12/31/23. Unable to reach   office please follow up [phone]  ---------------------------------------------------------------------------  --------------  CALL BACK INFO  [phone]; OK to leave message on voicemail  ---------------------------------------------------------------------------  --------------  SCRIPT ANSWERS  Relationship to Patient?  Self

## 2023-08-29 ENCOUNTER — NURSE ONLY (OUTPATIENT)
Age: 53
End: 2023-08-29

## 2023-08-29 DIAGNOSIS — E11.65 TYPE 2 DIABETES MELLITUS WITH HYPERGLYCEMIA, WITH LONG-TERM CURRENT USE OF INSULIN (HCC): ICD-10-CM

## 2023-08-29 DIAGNOSIS — E78.2 MIXED HYPERLIPIDEMIA: ICD-10-CM

## 2023-08-29 DIAGNOSIS — Z79.4 TYPE 2 DIABETES MELLITUS WITH HYPERGLYCEMIA, WITH LONG-TERM CURRENT USE OF INSULIN (HCC): ICD-10-CM

## 2023-08-30 LAB
ANION GAP SERPL CALC-SCNC: 8 MMOL/L (ref 5–15)
BUN SERPL-MCNC: 14 MG/DL (ref 6–20)
BUN/CREAT SERPL: 19 (ref 12–20)
CALCIUM SERPL-MCNC: 9.5 MG/DL (ref 8.5–10.1)
CHLORIDE SERPL-SCNC: 102 MMOL/L (ref 97–108)
CO2 SERPL-SCNC: 26 MMOL/L (ref 21–32)
COMMENT:: NORMAL
CREAT SERPL-MCNC: 0.74 MG/DL (ref 0.55–1.02)
CREAT UR-MCNC: 16.9 MG/DL
EST. AVERAGE GLUCOSE BLD GHB EST-MCNC: 226 MG/DL
GLUCOSE SERPL-MCNC: 161 MG/DL (ref 65–100)
HBA1C MFR BLD: 9.5 % (ref 4–5.6)
LDLC SERPL DIRECT ASSAY-MCNC: 132 MG/DL (ref 0–100)
MICROALBUMIN UR-MCNC: 2.96 MG/DL
MICROALBUMIN/CREAT UR-RTO: 175 MG/G (ref 0–30)
POTASSIUM SERPL-SCNC: 5 MMOL/L (ref 3.5–5.1)
SODIUM SERPL-SCNC: 136 MMOL/L (ref 136–145)
SPECIMEN HOLD: NORMAL

## 2023-09-05 ENCOUNTER — OFFICE VISIT (OUTPATIENT)
Age: 53
End: 2023-09-05
Payer: MEDICAID

## 2023-09-05 VITALS
DIASTOLIC BLOOD PRESSURE: 81 MMHG | HEART RATE: 91 BPM | BODY MASS INDEX: 35.15 KG/M2 | OXYGEN SATURATION: 98 % | WEIGHT: 205.9 LBS | RESPIRATION RATE: 17 BRPM | HEIGHT: 64 IN | SYSTOLIC BLOOD PRESSURE: 135 MMHG | TEMPERATURE: 98.1 F

## 2023-09-05 DIAGNOSIS — Z79.4 TYPE 2 DIABETES MELLITUS WITH HYPERGLYCEMIA, WITH LONG-TERM CURRENT USE OF INSULIN (HCC): Primary | ICD-10-CM

## 2023-09-05 DIAGNOSIS — S10.86XA TICK BITE OF OTHER PART OF NECK, INITIAL ENCOUNTER: ICD-10-CM

## 2023-09-05 DIAGNOSIS — E78.2 MIXED HYPERLIPIDEMIA: ICD-10-CM

## 2023-09-05 DIAGNOSIS — E11.65 TYPE 2 DIABETES MELLITUS WITH HYPERGLYCEMIA, WITH LONG-TERM CURRENT USE OF INSULIN (HCC): Primary | ICD-10-CM
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
## 2023-09-05 DIAGNOSIS — E11.65 TYPE 2 DIABETES MELLITUS WITH HYPERGLYCEMIA, WITHOUT LONG-TERM CURRENT USE OF INSULIN (HCC): Primary | ICD-10-CM

## 2023-09-05 DIAGNOSIS — M14.671 CHARCOT'S JOINT OF RIGHT FOOT: ICD-10-CM

## 2023-09-05 PROCEDURE — 3046F HEMOGLOBIN A1C LEVEL >9.0%: CPT | Performed by: INTERNAL MEDICINE

## 2023-09-05 PROCEDURE — 99215 OFFICE O/P EST HI 40 MIN: CPT | Performed by: INTERNAL MEDICINE

## 2023-09-05 RX ORDER — DULAGLUTIDE 0.75 MG/.5ML
0.75 INJECTION, SOLUTION SUBCUTANEOUS WEEKLY
Qty: 2 ML | Refills: 5 | Status: SHIPPED | OUTPATIENT
Start: 2023-09-05

## 2023-09-05 NOTE — PATIENT INSTRUCTIONS
Trulicity 1.77 mg weekly     If you cannot tolerate trulicity then start insulin Lantus 20 units at night

## 2023-09-07 RX ORDER — DOXYCYCLINE HYCLATE 100 MG
TABLET ORAL
Qty: 20 TABLET | Refills: 0 | OUTPATIENT
Start: 2023-09-07

## 2023-09-10 PROBLEM — M14.671 CHARCOT'S JOINT OF RIGHT FOOT: Status: ACTIVE | Noted: 2023-09-10

## 2023-09-12 NOTE — TELEPHONE ENCOUNTER
Spoke to [address] [place] [address] and gave them the potential CPT codes for Ms. Steven Dumont.    Codes Given:  [id number]

## 2023-10-09 RX ORDER — DUPILUMAB 300 MG/2ML
INJECTION, SOLUTION SUBCUTANEOUS
COMMUNITY
Start: 2023-04-15

## 2023-10-09 RX ORDER — MOMETASONE FUROATE 1 MG/G
CREAM TOPICAL
COMMUNITY
Start: 2023-08-01

## 2023-10-09 RX ORDER — NYSTATIN 100000 U/G
CREAM TOPICAL
COMMUNITY
Start: 2023-03-20 | End: 2023-10-10

## 2023-10-09 RX ORDER — NYSTATIN 100000 U/G
CREAM TOPICAL
COMMUNITY
Start: 2023-08-17

## 2023-10-09 RX ORDER — MULTIVITAMIN
1 TABLET ORAL DAILY
COMMUNITY

## 2023-10-10 ENCOUNTER — OFFICE VISIT (OUTPATIENT)
Dept: PRIMARY CARE CLINIC | Facility: CLINIC | Age: 53
End: 2023-10-10

## 2023-10-10 VITALS
HEIGHT: 64 IN | DIASTOLIC BLOOD PRESSURE: 77 MMHG | TEMPERATURE: 97.5 F | HEART RATE: 90 BPM | SYSTOLIC BLOOD PRESSURE: 148 MMHG | WEIGHT: 200 LBS | BODY MASS INDEX: 34.15 KG/M2 | OXYGEN SATURATION: 99 % | RESPIRATION RATE: 16 BRPM

## 2023-10-10 DIAGNOSIS — E11.65 TYPE 2 DIABETES MELLITUS WITH HYPERGLYCEMIA, WITH LONG-TERM CURRENT USE OF INSULIN (HCC): Chronic | ICD-10-CM

## 2023-10-10 DIAGNOSIS — L30.9 DERMATITIS: Primary | ICD-10-CM

## 2023-10-10 DIAGNOSIS — Z79.4 TYPE 2 DIABETES MELLITUS WITH HYPERGLYCEMIA, WITH LONG-TERM CURRENT USE OF INSULIN (HCC): Chronic | ICD-10-CM

## 2023-10-10 DIAGNOSIS — L03.211 CELLULITIS OF FACE: ICD-10-CM

## 2023-10-10 RX ORDER — CLOBETASOL PROPIONATE 0.5 MG/G
OINTMENT TOPICAL
Qty: 30 G | Refills: 0 | Status: SHIPPED | OUTPATIENT
Start: 2023-10-10

## 2023-10-10 RX ORDER — DOXYCYCLINE HYCLATE 100 MG
100 TABLET ORAL 2 TIMES DAILY
Qty: 14 TABLET | Refills: 0 | Status: SHIPPED | OUTPATIENT
Start: 2023-10-10 | End: 2023-10-17

## 2023-10-10 ASSESSMENT — PATIENT HEALTH QUESTIONNAIRE - PHQ9
2. FEELING DOWN, DEPRESSED OR HOPELESS: 0
1. LITTLE INTEREST OR PLEASURE IN DOING THINGS: 0
SUM OF ALL RESPONSES TO PHQ QUESTIONS 1-9: 0
SUM OF ALL RESPONSES TO PHQ9 QUESTIONS 1 & 2: 0

## 2023-10-24 DIAGNOSIS — L30.9 DERMATITIS: ICD-10-CM

## 2023-10-26 RX ORDER — CLOBETASOL PROPIONATE 0.5 MG/G
OINTMENT TOPICAL
Qty: 30 G | Refills: 0 | Status: SHIPPED | OUTPATIENT
Start: 2023-10-26

## 2023-11-09 ENCOUNTER — TELEPHONE (OUTPATIENT)
Dept: PRIMARY CARE CLINIC | Facility: CLINIC | Age: 53
End: 2023-11-09

## 2023-11-09 DIAGNOSIS — G47.00 INSOMNIA, UNSPECIFIED TYPE: ICD-10-CM

## 2023-11-09 RX ORDER — HYDROXYZINE HYDROCHLORIDE 25 MG/1
25 TABLET, FILM COATED ORAL NIGHTLY
Qty: 30 TABLET | Refills: 1 | Status: SHIPPED | OUTPATIENT
Start: 2023-11-09

## 2023-11-09 NOTE — TELEPHONE ENCOUNTER
Shira Owens is requesting a refill on hydroxine .      Patient's last appointment was 10/10/2023  Next visit is scheduled for Visit date not found   Please send medication to:   [facility]/pharmacy #[id number]  [address] [place] [address] Drive [address] [place] [address]  Phone: [phone] Fax: [phone]

## 2023-12-02 DIAGNOSIS — L03.211 CELLULITIS OF FACE: ICD-10-CM

## 2023-12-10 DIAGNOSIS — L30.9 DERMATITIS: ICD-10-CM

## 2023-12-11 RX ORDER — CLOBETASOL PROPIONATE 0.5 MG/G
OINTMENT TOPICAL
Qty: 30 G | Refills: 0 | Status: SHIPPED | OUTPATIENT
Start: 2023-12-11

## 2023-12-27 ENCOUNTER — NURSE TRIAGE (OUTPATIENT)
Dept: OTHER | Facility: CLINIC | Age: 53
End: 2023-12-27

## 2023-12-27 NOTE — TELEPHONE ENCOUNTER
[facility]    Received call from Ari Correia at [facility] with [employer]. Subjective: Caller states \"I have Charcot foot - I have pain, numbness tingling and weakness in my right  foot\" left foot is starting to hurt now too  Podiatrist performed surgery 2 weeks ago. Foot had a black spot with drainage    Current Symptoms:     Onset: 2 years ago;     Associated Symptoms: draining pus    Pain Severity: 9/10; sharp; constant    Temperature: 103 last night     What has been tried: Tylenol    LMP: NA Pregnant: NA    Recommended disposition: Go to ED/UCC Now (Or to Office with PCP Approval)    Care advice provided, patient verbalizes understanding; denies any other questions or concerns; instructed to call back for any new or worsening symptoms. Patient/Caller agrees with recommended disposition; writer provided warm transfer to [facility] at [facility] for appointment scheduling    Attention Provider: Thank you for allowing me to participate in the care of your patient. The patient was connected to triage in response to information provided to the ECC/PSC. Please do not respond through this encounter as the response is not directed to a shared pool. Reason for Disposition   Swollen foot and fever    Protocols used:  Foot Pain-ADULT-[place]

## 2023-12-28 ENCOUNTER — OFFICE VISIT (OUTPATIENT)
Facility: CLINIC | Age: 53
End: 2023-12-28
Payer: MEDICAID

## 2023-12-28 VITALS
DIASTOLIC BLOOD PRESSURE: 89 MMHG | TEMPERATURE: 98 F | HEART RATE: 90 BPM | SYSTOLIC BLOOD PRESSURE: 159 MMHG | OXYGEN SATURATION: 100 %

## 2023-12-28 DIAGNOSIS — Z79.4 TYPE 2 DIABETES MELLITUS WITH HYPERGLYCEMIA, WITH LONG-TERM CURRENT USE OF INSULIN (HCC): ICD-10-CM

## 2023-12-28 DIAGNOSIS — R03.0 ELEVATED BLOOD PRESSURE READING: ICD-10-CM

## 2023-12-28 DIAGNOSIS — E11.610 CHARCOT FOOT DUE TO DIABETES MELLITUS (HCC): Primary | ICD-10-CM

## 2023-12-28 DIAGNOSIS — E11.65 TYPE 2 DIABETES MELLITUS WITH HYPERGLYCEMIA, WITH LONG-TERM CURRENT USE OF INSULIN (HCC): ICD-10-CM

## 2023-12-28 PROCEDURE — 3046F HEMOGLOBIN A1C LEVEL >9.0%: CPT | Performed by: STUDENT IN AN ORGANIZED HEALTH CARE EDUCATION/TRAINING PROGRAM

## 2023-12-28 PROCEDURE — 99214 OFFICE O/P EST MOD 30 MIN: CPT | Performed by: STUDENT IN AN ORGANIZED HEALTH CARE EDUCATION/TRAINING PROGRAM

## 2023-12-28 RX ORDER — GLUCOSAMINE HCL/CHONDROITIN SU 500-400 MG
CAPSULE ORAL
Qty: 100 STRIP | Refills: 3 | Status: SHIPPED | OUTPATIENT
Start: 2023-12-28

## 2023-12-28 RX ORDER — IBUPROFEN 800 MG/1
800 TABLET ORAL 4 TIMES DAILY PRN
Qty: 120 TABLET | Refills: 0 | Status: SHIPPED | OUTPATIENT
Start: 2023-12-28 | End: 2024-01-27

## 2023-12-28 RX ORDER — LANCETS 30 GAUGE
1 EACH MISCELLANEOUS DAILY
Qty: 300 EACH | Refills: 1 | Status: SHIPPED | OUTPATIENT
Start: 2023-12-28

## 2023-12-28 RX ORDER — BLOOD-GLUCOSE METER
1 KIT MISCELLANEOUS DAILY
Qty: 1 KIT | Refills: 0 | Status: SHIPPED | OUTPATIENT
Start: 2023-12-28

## 2023-12-28 RX ORDER — TRIAMCINOLONE ACETONIDE 1 MG/G
CREAM TOPICAL
COMMUNITY
Start: 2023-03-27

## 2023-12-28 RX ORDER — ROSUVASTATIN CALCIUM 20 MG/1
20 TABLET, COATED ORAL NIGHTLY
COMMUNITY
Start: 2023-12-10

## 2023-12-28 RX ORDER — DUPILUMAB 100 MG/.67ML
INJECTION, SOLUTION SUBCUTANEOUS
COMMUNITY

## 2024-01-15 DIAGNOSIS — E11.65 TYPE 2 DIABETES MELLITUS WITH HYPERGLYCEMIA, WITHOUT LONG-TERM CURRENT USE OF INSULIN (HCC): Primary | ICD-10-CM

## 2024-01-20 DIAGNOSIS — L30.9 DERMATITIS: ICD-10-CM

## 2024-01-22 RX ORDER — CLOBETASOL PROPIONATE 0.5 MG/G
OINTMENT TOPICAL
Qty: 30 G | Refills: 0 | Status: SHIPPED | OUTPATIENT
Start: 2024-01-22

## 2024-01-24 ENCOUNTER — TELEMEDICINE (OUTPATIENT)
Age: 54
End: 2024-01-24
Payer: MEDICAID

## 2024-01-24 DIAGNOSIS — E78.2 MIXED HYPERLIPIDEMIA: ICD-10-CM

## 2024-01-24 DIAGNOSIS — E11.65 TYPE 2 DIABETES MELLITUS WITH HYPERGLYCEMIA, UNSPECIFIED WHETHER LONG TERM INSULIN USE (HCC): Primary | ICD-10-CM

## 2024-01-24 DIAGNOSIS — E11.65 TYPE 2 DIABETES MELLITUS WITH HYPERGLYCEMIA, WITHOUT LONG-TERM CURRENT USE OF INSULIN (HCC): Primary | ICD-10-CM

## 2024-01-24 PROCEDURE — 99215 OFFICE O/P EST HI 40 MIN: CPT | Performed by: INTERNAL MEDICINE

## 2024-01-24 RX ORDER — DULAGLUTIDE 1.5 MG/.5ML
1.5 INJECTION, SOLUTION SUBCUTANEOUS WEEKLY
Qty: 2 ML | Refills: 5 | Status: SHIPPED | OUTPATIENT
Start: 2024-01-24

## 2024-01-24 RX ORDER — INSULIN GLARGINE 100 [IU]/ML
40 INJECTION, SOLUTION SUBCUTANEOUS NIGHTLY
COMMUNITY

## 2024-01-24 NOTE — PROGRESS NOTES
Sesar Echavarria is a 53 y.o. female here for   Chief Complaint   Patient presents with    Diabetes       1. Have you been to the ER, urgent care clinic since your last visit?  Hospitalized since your last visit? - [facility]  ER- Infection of right foot and Elevated Blood Sugar- Early Jan 2024    2. Have you seen or consulted any other health care providers outside of the [facility] since your last visit?  Include any pap smears or colon screening.- Yes, Above.      
complications and also the nutritional facts but it is just that she has difficulty following it.  She has a family history of type 2 diabetes.                   That      Physical Examination:        Diabetic feet exam ; September 2023        H/o partial or complete amputation of foot : No    H/o previous foot ulceration : No    H/o pre - ulcerative callus : No    H/o peripheral neuropathy and callus : No    H/o poor circulation  : No    Foot deformity : Right Charcot's foot                    Assessment/Plan:       1. Type 2 Diabetes Mellitus ,       Uncontrolled diabetes mellitus, Stressed adherence to the medications, understands the complications   Could not tolerate Metformin   Trulicity, Lantus 40 units     Discussed the importance of checking home glucose regularly and taking all of their scheduled medications in order to have the best possible outcome.Reviewed the complications  and importance of diet.      FLU annually ,Pneumovax ,aspirin daily,annual eye exam,microalbumin      2. Obesity:      3. Hyperlipidemia: statin       4.  Peripheral neuropathy:     5.  Charcot's foot   Counseled extensively, discussed the long-term complications, she understands the risk.   She is at a very high risk for complications including blindness, renal failure, strokes and CAD            Spent > 40 minutes on the day of the visit reviewing chart, examining, ordering/reviewing labs, counseling, discussing therapeutics and documentation in the medical record      Thank you for allowing me to participate in the care of this patient.      Yusra Washburn MD      Patient verbalized understanding         Voice-recognition software was used to generate this report, which may result in some phonetic-based errors in the grammar and contents.  Even though attempts were made to correct all the mistakes,  some may have been missed and remained in the body of the report.

## 2024-02-03 DIAGNOSIS — L30.9 DERMATITIS: ICD-10-CM

## 2024-02-05 ENCOUNTER — TELEPHONE (OUTPATIENT)
Dept: PRIMARY CARE CLINIC | Facility: CLINIC | Age: 54
End: 2024-02-05

## 2024-02-05 ENCOUNTER — HOSPITAL ENCOUNTER (OUTPATIENT)
Facility: HOSPITAL | Age: 54
Discharge: HOME | End: 2024-02-08
Payer: MEDICAID

## 2024-02-05 DIAGNOSIS — I87.2 PERIPHERAL VENOUS INSUFFICIENCY: ICD-10-CM

## 2024-02-05 DIAGNOSIS — L03.211 CELLULITIS OF FACE: Primary | ICD-10-CM

## 2024-02-05 DIAGNOSIS — M14.671 CHARCOT'S JOINT OF ANKLE, RIGHT: ICD-10-CM

## 2024-02-05 LAB — CREAT BLD-MCNC: 0.7 MG/DL (ref 0.6–1.3)

## 2024-02-05 PROCEDURE — 82565 ASSAY OF CREATININE: CPT

## 2024-02-05 PROCEDURE — 6360000004 HC RX CONTRAST MEDICATION: Performed by: INTERNAL MEDICINE

## 2024-02-05 PROCEDURE — 73701 CT LOWER EXTREMITY W/DYE: CPT

## 2024-02-05 RX ORDER — CLOBETASOL PROPIONATE 0.5 MG/G
OINTMENT TOPICAL
Qty: 30 G | Refills: 0 | Status: SHIPPED | OUTPATIENT
Start: 2024-02-05

## 2024-02-05 RX ADMIN — IOPAMIDOL 100 ML: 755 INJECTION, SOLUTION INTRAVENOUS at 16:45

## 2024-02-05 NOTE — TELEPHONE ENCOUNTER
----- Message from Tuyet Hatfield sent at 2/5/2024  2:20 PM EST -----  Subject: Message to Provider    QUESTIONS  Information for Provider? pt needs HH to come out for her \"charcoal foot\"   that got infected in december, and the orders need to come from pcp.   please call pt to discuss exactly what is needed. thanks  ---------------------------------------------------------------------------  --------------  CALL BACK INFO  [phone]; OK to leave message on voicemail  ---------------------------------------------------------------------------  --------------  SCRIPT ANSWERS  Relationship to Patient? Self

## 2024-02-06 NOTE — TELEPHONE ENCOUNTER
Spoke to patient   Company name is [employer]  For: Nadira   Fax [phone]  Patient states she will call back with additional information that is needed because she is not sure what else she needs them for until she sees the specialist

## 2024-02-07 DIAGNOSIS — E78.2 MIXED HYPERLIPIDEMIA: ICD-10-CM

## 2024-02-07 DIAGNOSIS — E11.65 TYPE 2 DIABETES MELLITUS WITH HYPERGLYCEMIA (HCC): ICD-10-CM

## 2024-02-07 DIAGNOSIS — E11.65 TYPE 2 DIABETES MELLITUS WITH HYPERGLYCEMIA, WITH LONG-TERM CURRENT USE OF INSULIN (HCC): ICD-10-CM

## 2024-02-07 DIAGNOSIS — E11.65 TYPE 2 DIABETES MELLITUS WITH HYPERGLYCEMIA, UNSPECIFIED WHETHER LONG TERM INSULIN USE (HCC): Primary | ICD-10-CM

## 2024-02-07 DIAGNOSIS — Z79.4 TYPE 2 DIABETES MELLITUS WITH HYPERGLYCEMIA, WITH LONG-TERM CURRENT USE OF INSULIN (HCC): ICD-10-CM

## 2024-02-07 RX ORDER — CALCIUM CITRATE/VITAMIN D3 200MG-6.25
TABLET ORAL
Qty: 300 EACH | Refills: 3 | Status: SHIPPED | OUTPATIENT
Start: 2024-02-07

## 2024-02-07 NOTE — TELEPHONE ENCOUNTER
Refill request received for True Metrix test strips stating pt is testing up to 8 tines daily. Per Dr. Washburn will send rx for TID.

## 2024-02-08 ENCOUNTER — TELEPHONE (OUTPATIENT)
Facility: CLINIC | Age: 54
End: 2024-02-08

## 2024-02-08 RX ORDER — ROSUVASTATIN CALCIUM 20 MG/1
20 TABLET, COATED ORAL NIGHTLY
Qty: 30 TABLET | Refills: 11 | Status: SHIPPED | OUTPATIENT
Start: 2024-02-08

## 2024-02-08 NOTE — TELEPHONE ENCOUNTER
Will await further information from patient so that HH orders can be filled out    Her podiatrist recommends she be seen in office sooner than 2/22 to help with sugar control

## 2024-02-12 LAB — MAMMOGRAPHY, EXTERNAL: NORMAL

## 2024-02-12 NOTE — TELEPHONE ENCOUNTER
Patient understands she will have to pay out of pocket if she is choosing to test more than 3x's a day, although the pharmacy is telling her no prescription [facility], can we resend script

## 2024-02-13 ENCOUNTER — OFFICE VISIT (OUTPATIENT)
Facility: CLINIC | Age: 54
End: 2024-02-13
Payer: MEDICAID

## 2024-02-13 VITALS
OXYGEN SATURATION: 100 % | BODY MASS INDEX: 35.85 KG/M2 | HEART RATE: 85 BPM | SYSTOLIC BLOOD PRESSURE: 147 MMHG | WEIGHT: 210 LBS | DIASTOLIC BLOOD PRESSURE: 82 MMHG | TEMPERATURE: 96 F | HEIGHT: 64 IN

## 2024-02-13 DIAGNOSIS — E11.65 TYPE 2 DIABETES MELLITUS WITH HYPERGLYCEMIA, WITH LONG-TERM CURRENT USE OF INSULIN (HCC): ICD-10-CM

## 2024-02-13 DIAGNOSIS — M86.9 OSTEOMYELITIS OF RIGHT FOOT, UNSPECIFIED TYPE (HCC): ICD-10-CM

## 2024-02-13 DIAGNOSIS — Z01.818 PREOPERATIVE TESTING: Primary | ICD-10-CM

## 2024-02-13 DIAGNOSIS — Z79.4 TYPE 2 DIABETES MELLITUS WITH HYPERGLYCEMIA, WITH LONG-TERM CURRENT USE OF INSULIN (HCC): ICD-10-CM

## 2024-02-13 DIAGNOSIS — E11.610 DIABETIC CHARCOT FOOT (HCC): ICD-10-CM

## 2024-02-13 DIAGNOSIS — Z01.818 PREOPERATIVE TESTING: ICD-10-CM

## 2024-02-13 PROCEDURE — 99214 OFFICE O/P EST MOD 30 MIN: CPT | Performed by: STUDENT IN AN ORGANIZED HEALTH CARE EDUCATION/TRAINING PROGRAM

## 2024-02-13 NOTE — PROGRESS NOTES
Chief Complaint   Patient presents with    Equipment Request     Care giver       Foot Injury     Infection       \"Have you been to the ER, urgent care clinic since your last visit?  Hospitalized since your last visit?\"    YES - When: approximately 5  weeks ago.  Where and Why: [place] foot pain/ infection.    “Have you seen or consulted any other health care providers outside of [facility] since your last visit?”    YES - When: approximately 7 days ago.  Where and Why: foot injury.    “Have you had a colorectal cancer screening such as a colonoscopy/FIT/Cologuard?    NO     Have you had a mammogram?”   NO     “Have you had a pap smear?”    NO    BP (!) 147/82 (Site: Left Upper Arm, Position: Sitting, Cuff Size: Large Adult)   Pulse 85   Temp (!) 96 °F (35.6 °C) (Temporal)   Ht 1.626 m (5' 4\")   Wt 95.3 kg (210 lb) Comment: with boot  SpO2 100%   BMI 36.05 kg/m²

## 2024-02-13 NOTE — PROGRESS NOTES
[facility]  Subjective       Chief Complaint   Patient presents with    Equipment Request     Care giver       Foot Injury     Infection         HPI:  Sesar Echavarria is a 53 y.o. female.    Here today for EOC follow up.    Is here for preoperative evaluation and to discuss T2DM on request of her DPM    Patient is a 53-year-old female with past medical history of right Charcot foot/uncontrolled type 2 diabetes mellitus, hyperlipidemia, obesity with failure of conservative therapies for Charcot foot/osteomyelitis now a candidate for surgical intervention.    TYPE 2 DIABETES MELLITUS  Insulin Dependent  Reports Compliance with Medication  Diet: has cut out sweets, breakfast yogurt with linden seeds, coffee, salad, piece of protein, gave up red meat; = has made these changes for about four weeks  Fasting Blood Sugars have significantly improved: 80-low 100s  Says sometimes when stress goes up she notices sugars going up  Last A1c was August but due for repeat  Hemoglobin A1C   Date Value Ref Range Status   08/29/2023 9.5 (H) 4.0 - 5.6 % Final     Comment:     NEW METHOD  PLEASE NOTE NEW REFERENCE RANGE  (NOTE)  HbA1C Interpretive Ranges  <5.7              Normal  5.7 - 6.4         Consider Prediabetes  >6.5              Consider Diabetes        insulin glargine - 100 UNIT/ML taking 40 units daily  Trulicity 1.5 mg q weekly  Has an endocrinologist/specialists    Has approved to get caregiver for home health.   16 hours- Monday     Vivian Echavarria (Child)  [phone] (Mobile)   CNA, pHd in psychology     Dr. Oli FRAGOSO has requested preoperative consultation before procedure.    Date of Procedure: to be determined    No prior anesthesia complications.    Denies history of heart or lung disease.     Past Medical History:   Diagnosis Date    COVID 2023    Hyperlipidemia     Obesity     Type II or unspecified type diabetes mellitus without mention of complication, uncontrolled      Current Outpatient

## 2024-02-14 LAB
25(OH)D3+25(OH)D2 SERPL-MCNC: 34.9 NG/ML (ref 30–100)
BASOPHILS # BLD AUTO: 0 X10E3/UL (ref 0–0.2)
BASOPHILS NFR BLD AUTO: 0 %
EOSINOPHIL # BLD AUTO: 0.2 X10E3/UL (ref 0–0.4)
EOSINOPHIL NFR BLD AUTO: 2 %
ERYTHROCYTE [DISTWIDTH] IN BLOOD BY AUTOMATED COUNT: 12.4 % (ref 11.7–15.4)
HBA1C MFR BLD: 8.2 % (ref 4.8–5.6)
HCT VFR BLD AUTO: 39.3 % (ref 34–46.6)
HGB BLD-MCNC: 13.2 G/DL (ref 11.1–15.9)
IMM GRANULOCYTES # BLD AUTO: 0 X10E3/UL (ref 0–0.1)
IMM GRANULOCYTES NFR BLD AUTO: 0 %
LYMPHOCYTES # BLD AUTO: 3.9 X10E3/UL (ref 0.7–3.1)
LYMPHOCYTES NFR BLD AUTO: 42 %
MCH RBC QN AUTO: 29.2 PG (ref 26.6–33)
MCHC RBC AUTO-ENTMCNC: 33.6 G/DL (ref 31.5–35.7)
MCV RBC AUTO: 87 FL (ref 79–97)
MONOCYTES # BLD AUTO: 0.6 X10E3/UL (ref 0.1–0.9)
MONOCYTES NFR BLD AUTO: 6 %
NEUTROPHILS # BLD AUTO: 4.6 X10E3/UL (ref 1.4–7)
NEUTROPHILS NFR BLD AUTO: 50 %
PLATELET # BLD AUTO: 296 X10E3/UL (ref 150–450)
RBC # BLD AUTO: 4.52 X10E6/UL (ref 3.77–5.28)
WBC # BLD AUTO: 9.3 X10E3/UL (ref 3.4–10.8)

## 2024-02-15 ENCOUNTER — TELEPHONE (OUTPATIENT)
Facility: CLINIC | Age: 54
End: 2024-02-15

## 2024-02-15 LAB
ALBUMIN SERPL-MCNC: 4.5 G/DL (ref 3.8–4.9)
ALBUMIN/GLOB SERPL: 1.5 {RATIO} (ref 1.2–2.2)
ALP SERPL-CCNC: 84 IU/L (ref 44–121)
ALT SERPL-CCNC: 6 IU/L (ref 0–32)
AST SERPL-CCNC: 15 IU/L (ref 0–40)
BILIRUB SERPL-MCNC: 0.3 MG/DL (ref 0–1.2)
BUN SERPL-MCNC: 17 MG/DL (ref 6–24)
BUN/CREAT SERPL: 24 (ref 9–23)
CALCIUM SERPL-MCNC: 9.9 MG/DL (ref 8.7–10.2)
CHLORIDE SERPL-SCNC: 102 MMOL/L (ref 96–106)
CO2 SERPL-SCNC: 22 MMOL/L (ref 20–29)
CREAT SERPL-MCNC: 0.72 MG/DL (ref 0.57–1)
EGFRCR SERPLBLD CKD-EPI 2021: 99 ML/MIN/1.73
GLOBULIN SER CALC-MCNC: 3 G/DL (ref 1.5–4.5)
GLUCOSE SERPL-MCNC: 98 MG/DL (ref 70–99)
POTASSIUM SERPL-SCNC: 4.2 MMOL/L (ref 3.5–5.2)
PROT SERPL-MCNC: 7.5 G/DL (ref 6–8.5)
SODIUM SERPL-SCNC: 146 MMOL/L (ref 134–144)

## 2024-02-15 NOTE — PROGRESS NOTES
Spoke with Dr Guaman. He needs copy of labs/preop form and notes to be able to proceed with surgery. Given pros/cons believe patient would benefit from proceeding with surgery given most recent MRI showed Osteomyelitis

## 2024-02-15 NOTE — TELEPHONE ENCOUNTER
Spoke with patient she stated anthchristina wanted her to have us fax over the home health order and she stated she will call me back with that number .

## 2024-02-23 ENCOUNTER — TELEPHONE (OUTPATIENT)
Facility: CLINIC | Age: 54
End: 2024-02-23

## 2024-02-23 NOTE — TELEPHONE ENCOUNTER
Spoke with Dr Guaman 4:40 PM 02/23/24        He plans to direct admit sometime next week at [facility] to proceed with surgery

## 2024-02-26 ENCOUNTER — OFFICE VISIT (OUTPATIENT)
Age: 54
End: 2024-02-26
Payer: MEDICAID

## 2024-02-26 VITALS
HEART RATE: 87 BPM | SYSTOLIC BLOOD PRESSURE: 123 MMHG | BODY MASS INDEX: 35.12 KG/M2 | WEIGHT: 205.7 LBS | HEIGHT: 64 IN | OXYGEN SATURATION: 99 % | DIASTOLIC BLOOD PRESSURE: 77 MMHG | RESPIRATION RATE: 18 BRPM | TEMPERATURE: 98.8 F

## 2024-02-26 DIAGNOSIS — Z79.4 LONG TERM (CURRENT) USE OF INSULIN (HCC): ICD-10-CM

## 2024-02-26 DIAGNOSIS — E78.2 MIXED HYPERLIPIDEMIA: ICD-10-CM

## 2024-02-26 DIAGNOSIS — Z79.4 TYPE 2 DIABETES MELLITUS WITH HYPERGLYCEMIA, WITH LONG-TERM CURRENT USE OF INSULIN (HCC): Primary | ICD-10-CM

## 2024-02-26 DIAGNOSIS — E11.65 TYPE 2 DIABETES MELLITUS WITH HYPERGLYCEMIA, WITH LONG-TERM CURRENT USE OF INSULIN (HCC): Primary | ICD-10-CM

## 2024-02-26 PROCEDURE — 3052F HG A1C>EQUAL 8.0%<EQUAL 9.0%: CPT | Performed by: INTERNAL MEDICINE

## 2024-02-26 PROCEDURE — 99215 OFFICE O/P EST HI 40 MIN: CPT | Performed by: INTERNAL MEDICINE

## 2024-02-26 RX ORDER — DULAGLUTIDE 3 MG/.5ML
3 INJECTION, SOLUTION SUBCUTANEOUS WEEKLY
Qty: 2 ML | Refills: 11 | Status: SHIPPED | OUTPATIENT
Start: 2024-02-26

## 2024-02-26 NOTE — PROGRESS NOTES
[facility]                 Yusra Washburn MD      Patient Information   Date:2/9/2023   Name : Sesar Echavarria 52 y.o.       YOB: 1970           Referred by: Self referred               Chief Complaint   Patient presents with    Diabetes           History of Present Illness: Sesar Echavarria is here  for follow up  Diagnosed with right Charcot's foot, seeing podiatry, not checking the blood glucose, no meter to go by  Could not tolerate metformin  Foot infection - BG was high ,recently started the lantus , was in hospital ,  Checking blood glucose 3 times daily, blood glucose has improved  Few episodes of hypoglycemia and 1 episode of hypoglycemia  On Trulicity  Changed the diet     Prior history   Last visit was 2 years ago in 2020   Had abscess due to severe hyperglycemia, s/p surgery   On metformin, she discontinued insulin   Reportedly blood glucose 90s, she has changed the diet was on Lantus before   Has neuropathy   No recent labs   No blood glucose log or meter               Prior history February 2019   She has not been checking the blood glucose .she is sleeping better.  She is on Metformin and self stopped Lantus .   I have asked her to resume Lantus several times, she is going by the symptoms and since she feels better she is not taking insulin.   She is taking only metformin   She is not checking the blood glucose   She has been gaining weight      She was diagnosed with type 2 diabetes several years ago   She is a hospice nurse by profession, knows about all complications and also the nutritional facts but it is just that she has difficulty following it.  She has a family history of type 2 diabetes.               Physical Examination:  General: pleasant, no distress, good eye contact  HEENT: no exophthalmos, no periorbital edema, EOMI  Neck: No visible thyromegaly  RS: Normal respiratory effort  Musculoskeletal: no tremors  Neurological: alert and

## 2024-02-26 NOTE — PATIENT INSTRUCTIONS
Blood glucose goals    Fasting or before meals less than 120,  2 hours after meals or at bedtime less than 180.If the bedtime sugars are less than 100 ,eat a 15 gm snack.    Low blood glucose is less than 70, if you are using continuous glucose monitor please crosscheck with fingerstick as continuous glucose monitor is not accurate when glucose is less than 70.    4 glucose tablets or half a cup of juice for sugars <70. Check blood sugar 15 minutes later, may repeat juice or tablets in 15 minutes.     Glucagon use by family member for severe hypoglycemia (unconciousness)        Lantus 35 units

## 2024-03-15 ENCOUNTER — TELEPHONE (OUTPATIENT)
Facility: CLINIC | Age: 54
End: 2024-03-15

## 2024-03-15 DIAGNOSIS — E11.65 TYPE 2 DIABETES MELLITUS WITH HYPERGLYCEMIA, WITH LONG-TERM CURRENT USE OF INSULIN (HCC): ICD-10-CM

## 2024-03-15 DIAGNOSIS — E11.610 DIABETIC CHARCOT FOOT (HCC): Primary | ICD-10-CM

## 2024-03-15 DIAGNOSIS — Z79.4 TYPE 2 DIABETES MELLITUS WITH HYPERGLYCEMIA, WITH LONG-TERM CURRENT USE OF INSULIN (HCC): ICD-10-CM

## 2024-03-15 NOTE — TELEPHONE ENCOUNTER
Chris with patient's insurance stated that he did an initial assessment requesting DME    Shower chair   Bedside commode   Wheelchair   Knee scooter      Chris Will send note for visit/ asking would that be okay or do you need to see patient to order DME

## 2024-03-15 NOTE — TELEPHONE ENCOUNTER
Chris from [facility] called about DME orders for pt.  Please call him at [phone].  Thank you

## 2024-03-22 DIAGNOSIS — E11.65 TYPE 2 DIABETES MELLITUS WITH HYPERGLYCEMIA, WITHOUT LONG-TERM CURRENT USE OF INSULIN (HCC): ICD-10-CM

## 2024-03-22 DIAGNOSIS — L30.9 DERMATITIS: ICD-10-CM

## 2024-03-22 RX ORDER — LOVASTATIN 40 MG/1
40 TABLET ORAL NIGHTLY
Qty: 30 TABLET | Refills: 3 | OUTPATIENT
Start: 2024-03-22

## 2024-03-22 RX ORDER — CLOBETASOL PROPIONATE 0.5 MG/G
OINTMENT TOPICAL
Qty: 30 G | Refills: 0 | Status: SHIPPED | OUTPATIENT
Start: 2024-03-22

## 2024-05-15 DIAGNOSIS — L30.9 DERMATITIS: ICD-10-CM

## 2024-05-16 RX ORDER — CLOBETASOL PROPIONATE 0.5 MG/G
OINTMENT TOPICAL
Qty: 30 G | Refills: 0 | Status: SHIPPED | OUTPATIENT
Start: 2024-05-16

## 2024-05-17 ENCOUNTER — NURSE ONLY (OUTPATIENT)
Age: 54
End: 2024-05-17

## 2024-05-17 DIAGNOSIS — Z79.4 TYPE 2 DIABETES MELLITUS WITH HYPERGLYCEMIA, WITH LONG-TERM CURRENT USE OF INSULIN (HCC): ICD-10-CM

## 2024-05-17 DIAGNOSIS — E11.65 TYPE 2 DIABETES MELLITUS WITH HYPERGLYCEMIA, WITH LONG-TERM CURRENT USE OF INSULIN (HCC): ICD-10-CM

## 2024-05-17 DIAGNOSIS — E78.2 MIXED HYPERLIPIDEMIA: ICD-10-CM

## 2024-05-18 LAB
BUN SERPL-MCNC: 22 MG/DL (ref 6–24)
BUN/CREAT SERPL: 29 (ref 9–23)
CALCIUM SERPL-MCNC: 9.6 MG/DL (ref 8.7–10.2)
CHLORIDE SERPL-SCNC: 104 MMOL/L (ref 96–106)
CO2 SERPL-SCNC: 18 MMOL/L (ref 20–29)
CREAT SERPL-MCNC: 0.77 MG/DL (ref 0.57–1)
EGFRCR SERPLBLD CKD-EPI 2021: 91 ML/MIN/1.73
GLUCOSE SERPL-MCNC: 113 MG/DL (ref 70–99)
HBA1C MFR BLD: 7 % (ref 4.8–5.6)
POTASSIUM SERPL-SCNC: 4.3 MMOL/L (ref 3.5–5.2)
SODIUM SERPL-SCNC: 139 MMOL/L (ref 134–144)

## 2024-05-19 LAB
ALBUMIN/CREAT UR: 211 MG/G CREAT (ref 0–29)
CREAT UR-MCNC: 158.4 MG/DL
MICROALBUMIN UR-MCNC: 334.5 UG/ML

## 2024-05-24 ENCOUNTER — OFFICE VISIT (OUTPATIENT)
Age: 54
End: 2024-05-24
Payer: MEDICAID

## 2024-05-24 VITALS
HEART RATE: 98 BPM | TEMPERATURE: 98.1 F | WEIGHT: 200 LBS | OXYGEN SATURATION: 99 % | DIASTOLIC BLOOD PRESSURE: 72 MMHG | BODY MASS INDEX: 34.15 KG/M2 | HEIGHT: 64 IN | SYSTOLIC BLOOD PRESSURE: 143 MMHG | RESPIRATION RATE: 16 BRPM

## 2024-05-24 DIAGNOSIS — E78.2 MIXED HYPERLIPIDEMIA: ICD-10-CM

## 2024-05-24 DIAGNOSIS — E11.65 TYPE 2 DIABETES MELLITUS WITH HYPERGLYCEMIA, WITH LONG-TERM CURRENT USE OF INSULIN (HCC): Primary | ICD-10-CM

## 2024-05-24 DIAGNOSIS — I10 ESSENTIAL HYPERTENSION: ICD-10-CM

## 2024-05-24 DIAGNOSIS — Z79.4 TYPE 2 DIABETES MELLITUS WITH HYPERGLYCEMIA, WITH LONG-TERM CURRENT USE OF INSULIN (HCC): Primary | ICD-10-CM

## 2024-05-24 PROCEDURE — 99215 OFFICE O/P EST HI 40 MIN: CPT | Performed by: INTERNAL MEDICINE

## 2024-05-24 PROCEDURE — 3051F HG A1C>EQUAL 7.0%<8.0%: CPT | Performed by: INTERNAL MEDICINE

## 2024-05-24 PROCEDURE — 3078F DIAST BP <80 MM HG: CPT | Performed by: INTERNAL MEDICINE

## 2024-05-24 PROCEDURE — 3077F SYST BP >= 140 MM HG: CPT | Performed by: INTERNAL MEDICINE

## 2024-05-24 RX ORDER — DULAGLUTIDE 4.5 MG/.5ML
4.5 INJECTION, SOLUTION SUBCUTANEOUS WEEKLY
Qty: 12 ADJUSTABLE DOSE PRE-FILLED PEN SYRINGE | Refills: 3 | Status: SHIPPED | OUTPATIENT
Start: 2024-05-24

## 2024-05-24 NOTE — PROGRESS NOTES
Rm    Chief Complaint   Patient presents with    Follow-up     DM        BP (!) 143/72 (Site: Left Upper Arm)   Pulse 98   Temp 98.1 °F (36.7 °C)   Resp 16   Ht 1.626 m (5' 4\")   Wt 90.7 kg (200 lb)   SpO2 99%   BMI 34.33 kg/m²      1. Have you been to the ER, urgent care clinic since your last visit?  Hospitalized since your last visit? No     2. Have you seen or consulted any other health care providers outside of the [facility] since your last visit?  Include any pap smears or colon screening. No     Health Maintenance Due   Topic Date Due    Hepatitis B vaccine (1 of 3 - 3-dose series) Never done    HIV screen  Never done    DTaP/Tdap/Td vaccine (1 - Tdap) Never done    Cervical cancer screen  Never done    Colorectal Cancer Screen  Never done    Pneumococcal 0-64 years Vaccine (2 of 2 - PCV) 11/17/2015    Breast cancer screen  Never done    Shingles vaccine (1 of 2) Never done    COVID-19 Vaccine (3 - 2023-24 season) 09/01/2023    Diabetic foot exam  12/06/2023             No data to display                 Failed to redirect to the Timeline version of the Prim[employer]¬â„[employer]Vision SmartLink.    Failed to redirect to the Timeline version of the Prim[employer]â‚¬â„¢Vision SmartLink.             
Charcot's foot  Lab Results   Component Value Date    LABA1C 7.0 (H) 05/17/2024      Could not tolerate Metformin   Trulicity, Lantus  20 units, titrate down if hypoglycemia +  Will eventually switch insulin to oral medication   Discussed the importance of checking home glucose regularly and taking all of their scheduled medications in order to have the best possible outcome.Reviewed the complications  and importance of diet.      FLU annually ,Pneumovax ,aspirin daily,annual eye exam,microalbumin      2. Obesity:    Need ARB       3. Hyperlipidemia: statin       4.  Peripheral neuropathy:     5.  Charcot's foot  Followed by podiatrist     Continue to follow-up with PCP   Spent > 40 minutes on the day of the visit reviewing chart, examining, ordering/reviewing labs, counseling, discussing therapeutics and documentation in the medical record      Thank you for allowing me to participate in the care of this patient.      Yusra Washburn MD      Patient verbalized understanding         Voice-recognition software was used to generate this report, which may result in some phonetic-based errors in the grammar and contents.  Even though attempts were made to correct all the mistakes,  some may have been missed and remained in the body of the report.

## 2024-06-05 PROBLEM — R39.81 FUNCTIONAL URINARY INCONTINENCE: Status: ACTIVE | Noted: 2024-06-05

## 2024-07-18 DIAGNOSIS — G47.00 INSOMNIA, UNSPECIFIED TYPE: ICD-10-CM

## 2024-07-18 RX ORDER — HYDROXYZINE HYDROCHLORIDE 25 MG/1
25 TABLET, FILM COATED ORAL NIGHTLY
Qty: 30 TABLET | Refills: 1 | Status: SHIPPED | OUTPATIENT
Start: 2024-07-18

## 2024-07-18 NOTE — TELEPHONE ENCOUNTER
Patient called stating [facility] on [address] [phone] stated they have faxed the RX 6 times for the hydroxyzine 25mg.  Please call patient at [phone].  Thank you   Arava Counseling:  Patient counseled regarding adverse effects of Arava including but not limited to nausea, vomiting, abnormalities in liver function tests. Patients may develop mouth sores, rash, diarrhea, and abnormalities in blood counts. The patient understands that monitoring is required including LFTs and blood counts.  There is a rare possibility of scarring of the liver and lung problems that can occur when taking methotrexate. Persistent nausea, loss of appetite, pale stools, dark urine, cough, and shortness of breath should be reported immediately. Patient advised to discontinue Arava treatment and consult with a physician prior to attempting conception. The patient will have to undergo a treatment to eliminate Arava from the body prior to conception.

## 2024-07-18 NOTE — TELEPHONE ENCOUNTER
Requested Prescriptions     Pending Prescriptions Disp Refills    hydrOXYzine HCl (ATARAX) 25 MG tablet 30 tablet 1     Sig: Take 1 tablet by mouth nightly

## 2024-07-31 ENCOUNTER — TELEPHONE (OUTPATIENT)
Facility: CLINIC | Age: 54
End: 2024-07-31

## 2024-07-31 NOTE — TELEPHONE ENCOUNTER
----- Message from Mega Green sent at 7/31/2024 11:43 AM EDT -----  Regarding: ECC Message to Provider  ECC Message to Provider    Relationship to Patient: Covered Entity ([employer])    Additional Information Caller wanted to verify the patient's PCP to sign the Home Health care [facility].  --------------------------------------------------------------------------------------------------------------------------    Call Back Information: OK to leave message on voicemail  Preferred Call Back Number: Phone [phone] (CELL)

## 2024-08-09 ENCOUNTER — TELEPHONE (OUTPATIENT)
Facility: CLINIC | Age: 54
End: 2024-08-09

## 2024-08-09 NOTE — TELEPHONE ENCOUNTER
----- Message from Ivanna [place] Wyman sent at 8/9/2024 12:08 PM EDT -----  Regarding: ECC Appointment Request  ECC Appointment Request    Patient needs appointment for ECC Appointment Type: ED Follow-Up.    Patient Requested Dates(s): Anytime as long as it is a Virtual Visit.  Patient Requested Time: Anytime.  Provider Name:    Reason for Appointment Request: Established Patient - No appointments available during search  --------------------------------------------------------------------------------------------------------------------------    Relationship to Patient: Self     Call Back Information: OK to leave message on voicemail  Preferred Call Back Number: Phone [phone]

## 2024-08-12 ENCOUNTER — TELEPHONE (OUTPATIENT)
Facility: CLINIC | Age: 54
End: 2024-08-12

## 2024-08-12 DIAGNOSIS — Z79.4 TYPE 2 DIABETES MELLITUS WITH HYPERGLYCEMIA, WITH LONG-TERM CURRENT USE OF INSULIN (HCC): ICD-10-CM

## 2024-08-12 DIAGNOSIS — M14.671 CHARCOT'S JOINT OF RIGHT FOOT: Primary | ICD-10-CM

## 2024-08-12 DIAGNOSIS — E11.65 TYPE 2 DIABETES MELLITUS WITH HYPERGLYCEMIA, WITH LONG-TERM CURRENT USE OF INSULIN (HCC): ICD-10-CM

## 2024-08-12 DIAGNOSIS — E11.610 DIABETIC CHARCOT FOOT (HCC): ICD-10-CM

## 2024-08-12 NOTE — TELEPHONE ENCOUNTER
Kimi from [facility] [phone] called and would like to get a verbal within 24 hours.  Kimi did an evaluation today on patient and would like to see patient once a week or once every two weeks for 5 weeks.      Also, Kimi would like a shower chair with bilateral arms ordered for patient.    Please call Kimi.

## 2024-08-14 ENCOUNTER — HOSPITAL ENCOUNTER (OUTPATIENT)
Facility: HOSPITAL | Age: 54
Discharge: HOME | End: 2024-08-14
Attending: FAMILY MEDICINE
Payer: MEDICAID

## 2024-08-14 VITALS
SYSTOLIC BLOOD PRESSURE: 154 MMHG | HEART RATE: 108 BPM | RESPIRATION RATE: 18 BRPM | TEMPERATURE: 97.1 F | DIASTOLIC BLOOD PRESSURE: 82 MMHG

## 2024-08-14 DIAGNOSIS — E11.610 DIABETIC CHARCOT FOOT (HCC): ICD-10-CM

## 2024-08-14 DIAGNOSIS — M86.9 DIABETIC FOOT ULCER WITH OSTEOMYELITIS (HCC): ICD-10-CM

## 2024-08-14 DIAGNOSIS — E11.621 DIABETIC FOOT ULCER WITH OSTEOMYELITIS (HCC): ICD-10-CM

## 2024-08-14 DIAGNOSIS — E11.65 TYPE 2 DIABETES MELLITUS WITH HYPERGLYCEMIA, WITH LONG-TERM CURRENT USE OF INSULIN (HCC): ICD-10-CM

## 2024-08-14 DIAGNOSIS — M86.171 ACUTE OSTEOMYELITIS OF RIGHT FOOT (HCC): ICD-10-CM

## 2024-08-14 DIAGNOSIS — Z79.4 TYPE 2 DIABETES MELLITUS WITH HYPERGLYCEMIA, WITH LONG-TERM CURRENT USE OF INSULIN (HCC): ICD-10-CM

## 2024-08-14 DIAGNOSIS — L97.509 DIABETIC FOOT ULCER WITH OSTEOMYELITIS (HCC): ICD-10-CM

## 2024-08-14 DIAGNOSIS — E11.69 DIABETIC FOOT ULCER WITH OSTEOMYELITIS (HCC): ICD-10-CM

## 2024-08-14 DIAGNOSIS — E11.621 DIABETIC ULCER OF RIGHT MIDFOOT ASSOCIATED WITH TYPE 2 DIABETES MELLITUS, WITH FAT LAYER EXPOSED (HCC): ICD-10-CM

## 2024-08-14 DIAGNOSIS — M14.671 CHARCOT'S JOINT OF RIGHT FOOT: Primary | ICD-10-CM

## 2024-08-14 DIAGNOSIS — L97.412 DIABETIC ULCER OF RIGHT MIDFOOT ASSOCIATED WITH TYPE 2 DIABETES MELLITUS, WITH FAT LAYER EXPOSED (HCC): ICD-10-CM

## 2024-08-14 PROBLEM — I10 ESSENTIAL HYPERTENSION: Status: ACTIVE | Noted: 2024-08-14

## 2024-08-14 PROCEDURE — 11042 DBRDMT SUBQ TIS 1ST 20SQCM/<: CPT

## 2024-08-14 PROCEDURE — 99204 OFFICE O/P NEW MOD 45 MIN: CPT

## 2024-08-14 RX ORDER — BACITRACIN ZINC AND POLYMYXIN B SULFATE 500; 1000 [USP'U]/G; [USP'U]/G
OINTMENT TOPICAL ONCE
OUTPATIENT
Start: 2024-08-14 | End: 2024-08-14

## 2024-08-14 RX ORDER — GENTAMICIN SULFATE 1 MG/G
OINTMENT TOPICAL ONCE
OUTPATIENT
Start: 2024-08-14 | End: 2024-08-14

## 2024-08-14 RX ORDER — LIDOCAINE HYDROCHLORIDE 40 MG/ML
SOLUTION TOPICAL ONCE
OUTPATIENT
Start: 2024-08-14 | End: 2024-08-14

## 2024-08-14 RX ORDER — TRIAMCINOLONE ACETONIDE 1 MG/G
OINTMENT TOPICAL ONCE
OUTPATIENT
Start: 2024-08-14 | End: 2024-08-14

## 2024-08-14 RX ORDER — GINSENG 100 MG
CAPSULE ORAL ONCE
OUTPATIENT
Start: 2024-08-14 | End: 2024-08-14

## 2024-08-14 RX ORDER — LIDOCAINE HYDROCHLORIDE 20 MG/ML
JELLY TOPICAL ONCE
OUTPATIENT
Start: 2024-08-14 | End: 2024-08-14

## 2024-08-14 RX ORDER — LISINOPRIL 5 MG/1
5 TABLET ORAL DAILY
COMMUNITY
Start: 2024-08-07

## 2024-08-14 RX ORDER — IBUPROFEN 200 MG
TABLET ORAL ONCE
OUTPATIENT
Start: 2024-08-14 | End: 2024-08-14

## 2024-08-14 RX ORDER — BETAMETHASONE DIPROPIONATE 0.5 MG/G
CREAM TOPICAL ONCE
OUTPATIENT
Start: 2024-08-14 | End: 2024-08-14

## 2024-08-14 RX ORDER — LIDOCAINE 50 MG/G
OINTMENT TOPICAL ONCE
OUTPATIENT
Start: 2024-08-14 | End: 2024-08-14

## 2024-08-14 RX ORDER — LIDOCAINE 40 MG/G
CREAM TOPICAL ONCE
OUTPATIENT
Start: 2024-08-14 | End: 2024-08-14

## 2024-08-14 RX ORDER — SODIUM CHLOR/HYPOCHLOROUS ACID 0.033 %
SOLUTION, IRRIGATION IRRIGATION ONCE
OUTPATIENT
Start: 2024-08-14 | End: 2024-08-14

## 2024-08-14 RX ORDER — CLOBETASOL PROPIONATE 0.5 MG/G
OINTMENT TOPICAL ONCE
OUTPATIENT
Start: 2024-08-14 | End: 2024-08-14

## 2024-08-14 NOTE — WOUND CARE
RN HYPERBARIC OXYGEN THERAPY RISK ASSESSMENT TOOL   [facility]     Sesar Echavarria  MEDICAL RECORD NUMBER:  [id number]  AGE: 53 y.o.   GENDER: female  : 1970  EPISODE DATE:  2024       PAST MEDICAL HISTORY    Past Medical History:   Diagnosis Date    COV2023    Hyperlipidemia     Obesity     Type II or unspecified type diabetes mellitus without mention of complication, uncontrolled         PAST SURGICAL HISTORY  History reviewed. No pertinent surgical history.    FAMILY HISTORY  History reviewed. No pertinent family history.    SOCIAL HISTORY  Social History     Tobacco Use    Smoking status: Never    Smokeless tobacco: Never   Vaping Use    Vaping status: Never Used   Substance Use Topics    Alcohol use: No    Drug use: Never       ALLERGIES  Allergies   Allergen Reactions    Clindamycin     Doxycycline     Nirmatrelvir-Ritonavir Rash    Ritonavir Rash    Sulfa Antibiotics Rash    Sulfamethoxazole-Trimethoprim Rash     Other Reaction(s): ITCHING, NAUSEA, VOMITING       MEDICATIONS  Current Outpatient Medications on File Prior to Encounter   Medication Sig Dispense Refill    lisinopril (PRINIVIL;ZESTRIL) 5 MG tablet Take 1 tablet by mouth daily      piperacillin-tazobactam (ZOSYN) 3-0.375 GM per 50ML IVPB Infuse 50 mLs intravenously every 6 hours      hydrOXYzine HCl (ATARAX) 25 MG tablet Take 1 tablet by mouth nightly 30 tablet 1    Dulaglutide (TRULICITY) 4.5 MG/0.5ML SOPN Inject 4.5 mg into the skin once a week E11.65 12 Adjustable Dose Pre-filled Pen Syringe 3    clobetasol (TEMOVATE) 0.05 % ointment APPLY TO THE AFFECTED AREAS AS DIRECTED 30 g 0    blood glucose test strips (TRUE METRIX BLOOD GLUCOSE TEST) strip Use to check BG 3 times daily. Dx code E11.65 300 each 3    insulin glargine (LANTUS) 100 UNIT/ML injection vial Inject 40 Units into the skin nightly      Acetaminophen (TYLENOL PO) Take 525 mg by mouth daily      TURMERIC PO 1 tablet by Other route daily

## 2024-08-14 NOTE — WOUND CARE
Education provided to the patient and her daughter regarding Hyperbaric oxygen treatment. Reviewed appointment process, chamber safety, prohibited items and common adverse events. Patient and family verbalized understanding. Handout given, all questions answered at this time. Encouraged to RC as needed with further questions.

## 2024-08-14 NOTE — FLOWSHEET NOTE
08/14/24 1631   Right Leg Edema Point of Measurement   Compression Therapy Tubular elastic support bandage   Stockings Compression Pressure Low   Wound 08/14/24 Foot Right   Date First Assessed/Time First Assessed: 08/14/24 1521   Present on Original Admission: Yes  Wound Approximate Age at First Assessment (Weeks): 52 weeks  Primary Wound Type: Diabetic Ulcer  Location: Foot  Wound Location Orientation: Right   Dressing Status New dressing applied   Dressing/Treatment   (medihoney, gauze, guze, roll, tubi size F)   Offloading for Diabetic Foot Ulcers Offloading boot

## 2024-08-14 NOTE — PATIENT INSTRUCTIONS
Discharge Instructions/Wound Care Orders  [facility]   [address] [address]  Phone: [phone] Fax: [phone]    NAME:  Sesar Echavarria  YOB: 1970  MEDICAL RECORD NUMBER:  [id number]  DATE:  August 14, 2024    Wound Care Orders:  Right foot wound-Cleanse with baby shampoo. Allow to lather. Rinse and pat dry. Apply medihoney to wound. Cover with layers of dry gauze. Secure with roll gauze and tape. Change Friday and Monday. Apply tubigrip size F.    A referral has been sent for PVR with [facility]. Expect a call from them to set up appointment.     Per discussion regarding Hyperbaric Oxygen therapy (HBO)-We will verify eligibility with your insurance. This is a daily treatment, Monday thru Friday, for 30 days, lasting about 2 hours.    Activity:  [x] Elevate leg(s) above the level of the heart when sitting and avoid prolonged standing in one place.    [x] Wear off-loading shoe/boot on the affected foot when walking. Limit walking.   [x] Do not get dressing wet.    Dietary:  [] Diet as tolerated      [x] Diabetic Diet            [x] Increase Protein: examples (Meat, cheese, eggs, greek yogurt, fish, nuts)          [x] [employer] Therapeutic Nutrition Powder  [] Other:  [] Dial a Dietician : Call [facility] at [phone] enter code ([email or web address]) when prompted. M-F 9am-5pm EST.   Follow-up:  [x] Return Appointment: With Dr. Edith Elmore in 1 Week.  [] Ordered tests:       Electronically signed Katerina Muse RN on 8/14/2024 at 3:42 PM     Wound Care Center Information: Should you experience any significant changes in your wound(s) or have questions about your wound care, please contact the [facility] at MONDAY - FRIDAY 8:00 am - 4:30.  If you need help with your wound outside these hours and cannot wait until we are again available, contact your PCP or go to the hospital emergency room.     PLEASE NOTE: IF YOU ARE UNABLE TO

## 2024-08-14 NOTE — FLOWSHEET NOTE
08/14/24 1524   Wound 08/14/24 Foot Right   Date First Assessed/Time First Assessed: 08/14/24 1521   Present on Original Admission: Yes  Wound Approximate Age at First Assessment (Weeks): 52 weeks  Primary Wound Type: Diabetic Ulcer  Location: Foot  Wound Location Orientation: Right   Wound Etiology Diabetic   Dressing Status Old drainage noted   Wound Cleansed Soap and water   Dressing/Treatment Gauze dressing/dressing sponge   Offloading for Diabetic Foot Ulcers Offloading boot   Wound Length (cm) 0.4 cm   Wound Width (cm) 0.3 cm   Wound Depth (cm) 0.1 cm   Wound Surface Area (cm^2) 0.12 cm^2   Wound Volume (cm^3) 0.012 cm^3   Wound Assessment Pink/red   Drainage Amount Moderate (25-50%)   Drainage Description Serosanguinous   Odor None   Melanie-wound Assessment Hyperpigmented;Hyperkeratosis (callous);Dry/flaky;Maceration   Margins Defined edges   Wound Thickness Description not for Pressure Injury Full thickness     BP (!) 154/82   Pulse (!) 108   Temp 97.1 °F (36.2 °C) (Temporal)   Resp 18

## 2024-08-14 NOTE — PROGRESS NOTES
Wound Center  Progress Note / Procedure Note/ HBO Consult      Chief Complaint:  Sesar Echavarria is a 53 y.o. female  with Right foot wound of >1 year duration.        Assessment/Plan     53 y.o. female with     -Right foot chronic ulcer.  Diabetic foot ulcer, Del Rosario grade 3 osteomyelitis  Full thickness  Slough/granular/callused  Necessitates debridement  for wound healing and to prevent/heal infection  Ulcer needs debridement- see note below    -DM2  A1c has been come down considerably.  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 We had a lengthy discussion about the diagnosis and all risks and benefits of treatment with hyperbaric oxygen therapy.    We also discussed the inherent risks of HBOT, including confinement anxiety, otic-dental-sinus barotrauma, hypoglycemia in diabetes, seizure, progressive but usually reversible myopia, round-window or oval-window blowout during a vigorous Valsalva maneuver, ulnar nerve paresthesias, pulmonary oxygen toxicity if inter-treatment FIO2 is > 40%, gas embolism, respiratory arrest in CO2 retainers, pneumothorax, cardiac arrest, and fire & explosion.       Hyperbaric Oxygen Therapy Orders 30 sessions @ 2.5 PARVEEN for 90 minutes with two 5 min air breaks. Treatments to be provided once daily,

## 2024-08-15 NOTE — TELEPHONE ENCOUNTER
Called [facility] and spoke with the supervisor for Kimi. She took a verbal over the phone for the PT Order for 5 weeks and was informed that the shower chair order was signed also. Stated that she will send over an order for dr. Pruett to sign to have in their POC.

## 2024-08-19 ENCOUNTER — HOSPITAL ENCOUNTER (OUTPATIENT)
Facility: HOSPITAL | Age: 54
Discharge: HOME | End: 2024-08-22
Payer: MEDICAID

## 2024-08-19 DIAGNOSIS — E11.69 DIABETIC FOOT ULCER WITH OSTEOMYELITIS (HCC): ICD-10-CM

## 2024-08-19 DIAGNOSIS — M86.9 DIABETIC FOOT ULCER WITH OSTEOMYELITIS (HCC): ICD-10-CM

## 2024-08-19 DIAGNOSIS — E11.621 DIABETIC FOOT ULCER WITH OSTEOMYELITIS (HCC): ICD-10-CM

## 2024-08-19 DIAGNOSIS — L97.509 DIABETIC FOOT ULCER WITH OSTEOMYELITIS (HCC): ICD-10-CM

## 2024-08-19 LAB
EKG ATRIAL RATE: 88 BPM
EKG DIAGNOSIS: NORMAL
EKG P AXIS: 61 DEGREES
EKG P-R INTERVAL: 156 MS
EKG Q-T INTERVAL: 366 MS
EKG QRS DURATION: 82 MS
EKG QTC CALCULATION (BAZETT): 442 MS
EKG R AXIS: 33 DEGREES
EKG T AXIS: 40 DEGREES
EKG VENTRICULAR RATE: 88 BPM

## 2024-08-19 PROCEDURE — 71046 X-RAY EXAM CHEST 2 VIEWS: CPT

## 2024-08-19 PROCEDURE — 93010 ELECTROCARDIOGRAM REPORT: CPT | Performed by: SPECIALIST

## 2024-08-19 PROCEDURE — 93005 ELECTROCARDIOGRAM TRACING: CPT | Performed by: FAMILY MEDICINE

## 2024-08-20 LAB
HBA1C MFR BLD: 8.3 % (ref 4.8–5.6)
LDLC SERPL DIRECT ASSAY-MCNC: 149 MG/DL (ref 0–99)

## 2024-08-21 ENCOUNTER — HOSPITAL ENCOUNTER (OUTPATIENT)
Facility: HOSPITAL | Age: 54
Discharge: HOME | End: 2024-08-21
Attending: FAMILY MEDICINE
Payer: MEDICAID

## 2024-08-21 VITALS
RESPIRATION RATE: 18 BRPM | TEMPERATURE: 97.2 F | DIASTOLIC BLOOD PRESSURE: 87 MMHG | SYSTOLIC BLOOD PRESSURE: 141 MMHG | HEART RATE: 94 BPM

## 2024-08-21 DIAGNOSIS — E11.610 DIABETIC CHARCOT FOOT (HCC): ICD-10-CM

## 2024-08-21 DIAGNOSIS — M86.171 ACUTE OSTEOMYELITIS OF RIGHT FOOT (HCC): Primary | ICD-10-CM

## 2024-08-21 DIAGNOSIS — L97.412 DIABETIC ULCER OF RIGHT MIDFOOT ASSOCIATED WITH TYPE 2 DIABETES MELLITUS, WITH FAT LAYER EXPOSED (HCC): ICD-10-CM

## 2024-08-21 DIAGNOSIS — E11.621 DIABETIC ULCER OF RIGHT MIDFOOT ASSOCIATED WITH TYPE 2 DIABETES MELLITUS, WITH FAT LAYER EXPOSED (HCC): ICD-10-CM

## 2024-08-21 PROCEDURE — 11042 DBRDMT SUBQ TIS 1ST 20SQCM/<: CPT

## 2024-08-21 RX ORDER — LIDOCAINE 40 MG/G
CREAM TOPICAL ONCE
OUTPATIENT
Start: 2024-08-21 | End: 2024-08-21

## 2024-08-21 RX ORDER — IBUPROFEN 200 MG
TABLET ORAL ONCE
OUTPATIENT
Start: 2024-08-21 | End: 2024-08-21

## 2024-08-21 RX ORDER — BACITRACIN ZINC AND POLYMYXIN B SULFATE 500; 1000 [USP'U]/G; [USP'U]/G
OINTMENT TOPICAL ONCE
OUTPATIENT
Start: 2024-08-21 | End: 2024-08-21

## 2024-08-21 RX ORDER — GENTAMICIN SULFATE 1 MG/G
OINTMENT TOPICAL ONCE
OUTPATIENT
Start: 2024-08-21 | End: 2024-08-21

## 2024-08-21 RX ORDER — LIDOCAINE HYDROCHLORIDE 20 MG/ML
JELLY TOPICAL ONCE
OUTPATIENT
Start: 2024-08-21 | End: 2024-08-21

## 2024-08-21 RX ORDER — LIDOCAINE HYDROCHLORIDE 40 MG/ML
SOLUTION TOPICAL ONCE
OUTPATIENT
Start: 2024-08-21 | End: 2024-08-21

## 2024-08-21 RX ORDER — CLOBETASOL PROPIONATE 0.5 MG/G
OINTMENT TOPICAL ONCE
OUTPATIENT
Start: 2024-08-21 | End: 2024-08-21

## 2024-08-21 RX ORDER — SODIUM CHLOR/HYPOCHLOROUS ACID 0.033 %
SOLUTION, IRRIGATION IRRIGATION ONCE
OUTPATIENT
Start: 2024-08-21 | End: 2024-08-21

## 2024-08-21 RX ORDER — LIDOCAINE 50 MG/G
OINTMENT TOPICAL ONCE
OUTPATIENT
Start: 2024-08-21 | End: 2024-08-21

## 2024-08-21 RX ORDER — BETAMETHASONE DIPROPIONATE 0.5 MG/G
CREAM TOPICAL ONCE
OUTPATIENT
Start: 2024-08-21 | End: 2024-08-21

## 2024-08-21 RX ORDER — TRIAMCINOLONE ACETONIDE 1 MG/G
OINTMENT TOPICAL ONCE
OUTPATIENT
Start: 2024-08-21 | End: 2024-08-21

## 2024-08-21 RX ORDER — GINSENG 100 MG
CAPSULE ORAL ONCE
OUTPATIENT
Start: 2024-08-21 | End: 2024-08-21

## 2024-08-21 NOTE — PATIENT INSTRUCTIONS
Discharge Instructions/Wound Care Orders  [facility]   [address] [address]  Phone: [phone] Fax: [phone]    NAME:  Sesar Echavarria  YOB: 1970  MEDICAL RECORD NUMBER:  [id number]  DATE:  August 21, 2024    Wound Care Orders:  Right foot wound-Cleanse with baby shampoo. Allow to lather. Rinse and pat dry. Apply medihoney to wound. Cover with layers of dry gauze. Secure with roll gauze and tape. Change Friday and Monday. Apply tubigrip size F.     [facility] [phone]   Activity:  [x] Elevate leg(s) above the level of the heart when sitting and avoid prolonged standing in one place.    [x] Wear off-loading shoe/boot on the affected foot when walking. Limit walking.   [x] Do not get dressing wet.    Dietary:  [] Diet as tolerated      [x] Diabetic Diet            [x] Increase Protein: examples (Meat, cheese, eggs, greek yogurt, fish, nuts)          [x] [facility] Powder  [] Other:  [] Dial a Dietician : Call [facility] at [phone] enter code (249) when prompted. M-F 9am-5pm EST.   Follow-up:  [x] Return Appointment: With Dr. Edith Elmore in 1 Week.  [] Ordered tests:       Electronically signed Katerina Muse RN on 8/21/2024 at 1:26 PM     Wound Care Center Information: Should you experience any significant changes in your wound(s) or have questions about your wound care, please contact the [facility] at MONDAY - FRIDAY 8:00 am - 4:30.  If you need help with your wound outside these hours and cannot wait until we are again available, contact your PCP or go to the hospital emergency room.     PLEASE NOTE: IF YOU ARE UNABLE TO OBTAIN WOUND SUPPLIES, CONTINUE TO USE THE SUPPLIES YOU HAVE AVAILABLE UNTIL YOU ARE ABLE TO REACH US. IT IS MOST IMPORTANT TO KEEP THE WOUND COVERED AT ALL TIMES.     Physician Signature:_______________________    Date: ___________ Time:  ____________

## 2024-08-21 NOTE — PROGRESS NOTES
About Running Out of Food in the Last Year: Never true     Ran Out of Food in the Last Year: Never true   Transportation Needs: No Transportation Needs (7/22/2024)    [facility]    PRAP[facility] - Transportation     Lack of Transportation (Medical): No     Lack of Transportation (Non-Medical): No   Physical Activity: Insufficiently Active (7/21/2022)    Exercise Vital Sign     Days of Exercise per Week: 1 day     Minutes of Exercise per Session: 10 min   Intimate Partner Violence: Not At Risk (7/21/2022)    Humiliation, Afraid, Rape, and Kick questionnaire     Fear of Current or Ex-Partner: No     Emotionally Abused: No     Physically Abused: No     Sexually Abused: No   Housing Stability: Low Risk  (7/22/2024)    [facility]    Housing Stability Vital Sign     In the last 12 months, was there a time when you were not able to pay the mortgage or rent on time?: No     In the past 12 months, how many times have you moved where you were living?: 0     At any time in the past 12 months, were you homeless or living in a shelter (including now)?: No        Prior to Admission medications    Medication Sig Start Date End Date Taking? Authorizing Provider   lisinopril (PRINIVIL;ZESTRIL) 5 MG tablet Take 1 tablet by mouth daily 8/7/24  Yes ProviderMichael MD   piperacillin-tazobactam (ZOSYN) 3-0.375 GM per 50ML IVPB Infuse 50 mLs intravenously every 6 hours 7/30/24 9/4/24 Yes ProviderMichael MD   hydrOXYzine HCl (ATARAX) 25 MG tablet Take 1 tablet by mouth nightly 7/18/24  Yes Arabella Moreira DO   Dulaglutide (TRULICITY) 4.5 MG/0.5ML SOPN Inject 4.5 mg into the skin once a week E11.65 5/24/24  Yes Yusra Washburn MD   clobetasol (TEMOVATE) 0.05 % ointment APPLY TO THE AFFECTED AREAS AS DIRECTED 5/16/24  Yes Arabella Moreira DO   blood glucose test strips (TRUE METRIX BLOOD GLUCOSE TEST) strip Use to check BG 3 times daily. Dx code E11.65 2/7/24  Yes

## 2024-08-21 NOTE — FLOWSHEET NOTE
08/21/24 1324   Anesthetic   Anesthetic 4% Lidocaine Liquid Topical   Right Leg Edema Point of Measurement   Compression Therapy Tubular elastic support bandage   Stockings Compression Pressure Low   Wound 08/14/24 Foot Right   Date First Assessed/Time First Assessed: 08/14/24 1521   Present on Original Admission: Yes  Wound Approximate Age at First Assessment (Weeks): 52 weeks  Primary Wound Type: Diabetic Ulcer  Location: Foot  Wound Location Orientation: Right   Wound Image    Wound Etiology Diabetic   Dressing Status Old drainage noted   Wound Cleansed Cleansed with saline   Offloading for Diabetic Foot Ulcers Offloading boot   Wound Length (cm) 1 cm   Wound Width (cm) 0.5 cm   Wound Depth (cm) 0.1 cm   Wound Surface Area (cm^2) 0.5 cm^2   Change in Wound Size % (l*w) -316.67   Wound Volume (cm^3) 0.05 cm^3   Wound Healing % -317   Wound Assessment Pink/red   Drainage Amount Moderate (25-50%)   Drainage Description Serosanguinous   Odor None   Melanie-wound Assessment Hyperpigmented   Margins Defined edges   Wound Thickness Description not for Pressure Injury Full thickness     BP (!) 141/87   Pulse 94   Temp 97.2 °F (36.2 °C) (Temporal)   Resp 18

## 2024-08-21 NOTE — FLOWSHEET NOTE
08/21/24 1352   Right Leg Edema Point of Measurement   Compression Therapy Tubular elastic support bandage   Stockings Compression Pressure Low   Wound 08/14/24 Foot Right   Date First Assessed/Time First Assessed: 08/14/24 1521   Present on Original Admission: Yes  Wound Approximate Age at First Assessment (Weeks): 52 weeks  Primary Wound Type: Diabetic Ulcer  Location: Foot  Wound Location Orientation: Right   Dressing Status New dressing applied   Dressing/Treatment   (medihoney, gauze, tape tubi  size F)   Offloading for Diabetic Foot Ulcers Offloading boot

## 2024-08-28 ENCOUNTER — OFFICE VISIT (OUTPATIENT)
Age: 54
End: 2024-08-28
Payer: MEDICAID

## 2024-08-28 VITALS
HEART RATE: 93 BPM | HEIGHT: 64 IN | DIASTOLIC BLOOD PRESSURE: 99 MMHG | TEMPERATURE: 98.9 F | BODY MASS INDEX: 34.15 KG/M2 | OXYGEN SATURATION: 100 % | SYSTOLIC BLOOD PRESSURE: 147 MMHG | WEIGHT: 200 LBS

## 2024-08-28 DIAGNOSIS — I10 ESSENTIAL HYPERTENSION: ICD-10-CM

## 2024-08-28 DIAGNOSIS — Z79.4 TYPE 2 DIABETES MELLITUS WITH HYPERGLYCEMIA, WITH LONG-TERM CURRENT USE OF INSULIN (HCC): ICD-10-CM

## 2024-08-28 DIAGNOSIS — E11.65 TYPE 2 DIABETES MELLITUS WITH HYPERGLYCEMIA, WITH LONG-TERM CURRENT USE OF INSULIN (HCC): Primary | ICD-10-CM

## 2024-08-28 DIAGNOSIS — Z79.4 TYPE 2 DIABETES MELLITUS WITH HYPERGLYCEMIA, WITH LONG-TERM CURRENT USE OF INSULIN (HCC): Primary | ICD-10-CM

## 2024-08-28 DIAGNOSIS — E11.65 TYPE 2 DIABETES MELLITUS WITH HYPERGLYCEMIA, WITH LONG-TERM CURRENT USE OF INSULIN (HCC): ICD-10-CM

## 2024-08-28 DIAGNOSIS — E78.2 MIXED HYPERLIPIDEMIA: ICD-10-CM

## 2024-08-28 PROCEDURE — 3052F HG A1C>EQUAL 8.0%<EQUAL 9.0%: CPT | Performed by: INTERNAL MEDICINE

## 2024-08-28 PROCEDURE — 99215 OFFICE O/P EST HI 40 MIN: CPT | Performed by: INTERNAL MEDICINE

## 2024-08-28 PROCEDURE — 3077F SYST BP >= 140 MM HG: CPT | Performed by: INTERNAL MEDICINE

## 2024-08-28 PROCEDURE — 3080F DIAST BP >= 90 MM HG: CPT | Performed by: INTERNAL MEDICINE

## 2024-08-28 RX ORDER — INSULIN GLARGINE 100 [IU]/ML
15 INJECTION, SOLUTION SUBCUTANEOUS NIGHTLY
Qty: 15 ML | Refills: 3 | Status: SHIPPED | OUTPATIENT
Start: 2024-08-28

## 2024-08-28 RX ORDER — INSULIN LISPRO 100 [IU]/ML
INJECTION, SOLUTION INTRAVENOUS; SUBCUTANEOUS
Qty: 30 ML | Refills: 3 | Status: SHIPPED | OUTPATIENT
Start: 2024-08-28

## 2024-08-28 RX ORDER — CALCIUM CITRATE/VITAMIN D3 200MG-6.25
TABLET ORAL
Qty: 300 EACH | Refills: 3 | Status: SHIPPED | OUTPATIENT
Start: 2024-08-28

## 2024-08-28 NOTE — PROGRESS NOTES
[facility] DIABETES AND ENDOCRINOLOGY                 Yusra Washburn MD           Referred by: Self referred               Chief Complaint   Patient presents with    Diabetes           History of Present Illness: Sesar Echavarria is here  for follow up    Type 2 diabetes mellitus on insulin  Trulicity  Intermittently taking Lantus  2024: Osteomyelitis, 1 PICC line, antibiotics  Followed by wound clinic  May require hyperbaric oxygen therapy  Eating less  No severe hypoglycemia  Limited activity    Diagnosed with right Charcot's foot 2023, seeing podiatry,   Could not tolerate metformin                  Prior history February 2019   She has not been checking the blood glucose .she is sleeping better.  She is on Metformin and self stopped Lantus .   I have asked her to resume Lantus several times, she is going by the symptoms and since she feels better she is not taking insulin.   She is taking only metformin   She is not checking the blood glucose   She has been gaining weight      She was diagnosed with type 2 diabetes several years ago   She is a hospice nurse by profession, knows about all complications and also the nutritional facts but it is just that she has difficulty following it.  She has a family history of type 2 diabetes.               Physical Examination:  General: pleasant, no distress, good eye contact  HEENT: no exophthalmos, no periorbital edema, EOMI  Neck: No visible thyromegaly  RS: Normal respiratory effort  Musculoskeletal: no tremors  Neurological: alert and oriented  Psychiatric: normal mood and affect  Skin: Normal color      Diabetic feet exam ; September 2023        H/o partial or complete amputation of foot : No    H/o previous foot ulceration : No    H/o pre - ulcerative callus : No    H/o peripheral neuropathy and callus : No    H/o poor circulation  : No    Foot deformity : Right Charcot's foot                    Assessment/Plan:       1. Type 2 Diabetes Mellitus ,

## 2024-08-28 NOTE — PROGRESS NOTES
Sesar Echavarria is a 53 y.o. female here for   Chief Complaint   Patient presents with    Diabetes       1. Have you been to the ER, urgent care clinic since your last visit?  Hospitalized since your last visit? -Yes, Er for PICC line for MRSA     2. Have you seen or consulted any other health care providers outside of the [facility] since your last visit?  Include any pap smears or colon screening.-no

## 2024-08-28 NOTE — PATIENT INSTRUCTIONS
Hold Lantus  10 units at night     Humalog or Novolog fast acting insulin     Blood sugar  Fast acting insulin           201-250  Add 4 Units       251-300  Add 6 Units       301-350  Add 8   Units        351-400  Add 10  Units             Blood glucose goals    Fasting or before meals less than 120,  2 hours after meals or at bedtime less than 180.If the bedtime sugars are less than 100 ,eat a 15 gm snack.    Low blood glucose is less than 70, if you are using continuous glucose monitor please crosscheck with fingerstick as continuous glucose monitor is not accurate when glucose is less than 70.    4 glucose tablets or half a cup of juice for sugars <70. Check blood sugar 15 minutes later, may repeat juice or tablets in 15 minutes.     Glucagon use by family member for severe hypoglycemia (unconciousness)

## 2024-08-29 ENCOUNTER — HOSPITAL ENCOUNTER (OUTPATIENT)
Facility: HOSPITAL | Age: 54
Discharge: HOME | End: 2024-08-29
Attending: ORTHOPAEDIC SURGERY
Payer: MEDICAID

## 2024-08-29 VITALS
HEART RATE: 91 BPM | TEMPERATURE: 98.2 F | SYSTOLIC BLOOD PRESSURE: 130 MMHG | RESPIRATION RATE: 18 BRPM | DIASTOLIC BLOOD PRESSURE: 72 MMHG

## 2024-08-29 DIAGNOSIS — E11.610 DIABETIC CHARCOT FOOT (HCC): Primary | ICD-10-CM

## 2024-08-29 DIAGNOSIS — M86.171 ACUTE OSTEOMYELITIS OF RIGHT FOOT (HCC): ICD-10-CM

## 2024-08-29 DIAGNOSIS — E11.621 DIABETIC ULCER OF RIGHT MIDFOOT ASSOCIATED WITH TYPE 2 DIABETES MELLITUS, WITH FAT LAYER EXPOSED (HCC): ICD-10-CM

## 2024-08-29 DIAGNOSIS — L97.412 DIABETIC ULCER OF RIGHT MIDFOOT ASSOCIATED WITH TYPE 2 DIABETES MELLITUS, WITH FAT LAYER EXPOSED (HCC): ICD-10-CM

## 2024-08-29 PROCEDURE — 87176 TISSUE HOMOGENIZATION CULTR: CPT

## 2024-08-29 PROCEDURE — 11042 DBRDMT SUBQ TIS 1ST 20SQCM/<: CPT

## 2024-08-29 PROCEDURE — 99214 OFFICE O/P EST MOD 30 MIN: CPT

## 2024-08-29 PROCEDURE — 87070 CULTURE OTHR SPECIMN AEROBIC: CPT

## 2024-08-29 PROCEDURE — 87205 SMEAR GRAM STAIN: CPT

## 2024-08-29 RX ORDER — SODIUM CHLOR/HYPOCHLOROUS ACID 0.033 %
SOLUTION, IRRIGATION IRRIGATION ONCE
OUTPATIENT
Start: 2024-08-29 | End: 2024-08-29

## 2024-08-29 RX ORDER — TRIAMCINOLONE ACETONIDE 1 MG/G
OINTMENT TOPICAL ONCE
OUTPATIENT
Start: 2024-08-29 | End: 2024-08-29

## 2024-08-29 RX ORDER — BACITRACIN ZINC AND POLYMYXIN B SULFATE 500; 1000 [USP'U]/G; [USP'U]/G
OINTMENT TOPICAL ONCE
OUTPATIENT
Start: 2024-08-29 | End: 2024-08-29

## 2024-08-29 RX ORDER — LIDOCAINE HYDROCHLORIDE 40 MG/ML
SOLUTION TOPICAL ONCE
OUTPATIENT
Start: 2024-08-29 | End: 2024-08-29

## 2024-08-29 RX ORDER — LIDOCAINE 50 MG/G
OINTMENT TOPICAL ONCE
OUTPATIENT
Start: 2024-08-29 | End: 2024-08-29

## 2024-08-29 RX ORDER — CLOBETASOL PROPIONATE 0.5 MG/G
OINTMENT TOPICAL ONCE
OUTPATIENT
Start: 2024-08-29 | End: 2024-08-29

## 2024-08-29 RX ORDER — LIDOCAINE HYDROCHLORIDE 20 MG/ML
JELLY TOPICAL ONCE
OUTPATIENT
Start: 2024-08-29 | End: 2024-08-29

## 2024-08-29 RX ORDER — LIDOCAINE 40 MG/G
CREAM TOPICAL ONCE
OUTPATIENT
Start: 2024-08-29 | End: 2024-08-29

## 2024-08-29 RX ORDER — NEOMYCIN/BACITRACIN/POLYMYXINB 3.5-400-5K
OINTMENT (GRAM) TOPICAL ONCE
OUTPATIENT
Start: 2024-08-29 | End: 2024-08-29

## 2024-08-29 RX ORDER — MUPIROCIN 20 MG/G
OINTMENT TOPICAL ONCE
OUTPATIENT
Start: 2024-08-29 | End: 2024-08-29

## 2024-08-29 RX ORDER — GENTAMICIN SULFATE 1 MG/G
OINTMENT TOPICAL ONCE
OUTPATIENT
Start: 2024-08-29 | End: 2024-08-29

## 2024-08-29 RX ORDER — GINSENG 100 MG
CAPSULE ORAL ONCE
OUTPATIENT
Start: 2024-08-29 | End: 2024-08-29

## 2024-08-29 RX ORDER — BETAMETHASONE DIPROPIONATE 0.5 MG/G
CREAM TOPICAL ONCE
OUTPATIENT
Start: 2024-08-29 | End: 2024-08-29

## 2024-08-29 NOTE — DISCHARGE INSTRUCTIONS
Calcium Alginate with Silver   [] Calcium Alginate without silver              [] Collagen with silver            [] Collagen without Silver    [] Santyl with moist saline gauze                [] Hydrofera Blue (cut to size and moistened with normal saline)    [] Hydrofera Blue Ready (cut to size)                      [] Normal Saline wet to dry    [] Betadine wet to dry                          [] Hydrogel                 [] Mepitel                       [] Bactroban/Mupirocin              [] Iodoform Packing Strip      [] Plain Packing Strip              [] Skin Sub:   [] Other:       [x] Cover and Secure with:                   [x] Gauze         [x] Kendall           [] Kerlix              [] Zetuvit Plus Silicone Border or Foam Border        [] Super Absorbant    [] ABD                              [] Ace Wrap   [x] Other: FOAM              Limit contact of tape with skin.     [x] Change dressing:   [] Daily           [] Every Other Day    [] Twice per week   [] Three times per week              [] Once a week          [x] Do Not Change Dressing     [] Other:                Negative Pressure:           Wound Location:   [] Pressure @   mm/Hg                     []Continuous  []Intermittent              [] Black Foam            [] White Foam            [] Bridge  [] Change dressing 3 times per week           [] Skin Prep to jhonathan-wound  [] Other:      Pressure Relief:  [] When sitting, shift position or do seat lifts every 15 minutes.  [] Wheelchair cushion           [] Specialty Bed/Mattress  [] Turn every 2 hours when in bed.  Avoid direct pressure on wound site.  Limit side lying to 30 degree tilt.  Limit HOB elevation to 30 degrees.  [] Other                Edema Control:  Apply:  [] Compression Stocking:   mmHg    []Right Leg     []Left Leg              [] Tubigrip      []Right Leg Double Layer      []Left Leg Double Layer                                      []Right Leg Single Layer       []Left Leg Single  Josh  [] Dr. Brian Milligan  [] Dr. Lissette Howe     Nurse Case Manger:  VITALIY CHAVEZ RN        Wound Care Center Information: Should you experience any significant changes in your wound(s) or have questions about your wound care, please contact the Wound Center at [phone]. Our hours are Monday - Friday 8am - 4:30pm, closed all major holidays. If you need help with your wound outside these hours and cannot wait until we are again available, contact your PCP or go to the hospital emergency room.      PLEASE NOTE: IF YOU ARE UNABLE TO OBTAIN WOUND SUPPLIES, CONTINUE TO USE THE SUPPLIES YOU HAVE AVAILABLE UNTIL YOU ARE ABLE TO REACH US. IT IS MOST IMPORTANT TO KEEP THE WOUND COVERED AT ALL TIMES.

## 2024-08-29 NOTE — WOUND CARE
Wound Clinic Physician Orders and Discharge Instructions  [facility]  [address], [address]  [place] [address]  Telephone: [phone]     FAX [phone]    NAME:  Sesar Echavarria  YOB: 1970  MEDICAL RECORD NUMBER:  [id number]  DATE:  8/29/2024      Return Appointment:  [] Dressing Supply Provider:   [] Home Healthcare:  [x] Return Appointment: 1 Week(s)  [] Nurse Visit:      [] Discharge from [facility]: [] Healed        [] Refer to Provider:         [] Consult    Follow-up Information:  [] Ordered Tests:   [x] Referrals: EYE DR- PATIENT TO SCHEDULE AN APPOINTMENT  NEPHROLOGIST- PATIENT TO SCHEDULE AN APPOINTMENT  [] Rx:   [] Would Culture:  [x] Other:   *OVER THE COUNTER- MULTIVITAMIN AND IRON,   *CONSIDER HBO  *TO HAVE OUTPATIENT SURGERY- WILL RECEIVE A CALL WITH DATE AND TIME         Wound Cleansing:   Do not scrub or use excessive force.  Cleanse wound prior to applying a clean dressing with:  [x] Normal Saline OR   [x] Cleanse wound with Mild Soap & Water     [] Wound Cleanser   [] Keep Wound Dry in Shower  [] Wear a cast cover to shower  [] Other:       Topical Treatments:  Do not apply lotions, creams, or ointments to wound bed unless directed.   [] Apply moisturizing lotion to skin surrounding the wound prior to dressing change.  [] Apply antifungal ointment to skin surrounding the wound prior to dressing change.  [] Apply thin film of moisture barrier ointment to skin immediately around wound.  [] Apply Betadine to skin immediately around wound   [] Apply Skin Prep to skin immediately around wound  [] Other:      Dressings:           Wound Location RIGHT FOOT    [x] Apply Primary Dressing:       [] MediHoney Gel [] MediHoney Alginate  [x] Calcium Alginate with Silver   [] Calcium Alginate without silver   [] Collagen with silver [] Collagen without Silver    [] Santyl with moist saline gauze     [] Hydrofera Blue (cut to size and moistened with normal

## 2024-08-29 NOTE — WOUND CARE
[facility]   Wound Care and Hyperbaric Oxygen Therapy Center   Medical Staff Progress Note     Sesar Echavarria  MEDICAL RECORD NUMBER:  [id number]  AGE: 53 y.o.   GENDER: female  : 1970  EPISODE DATE:  2024    Chief complaint and reason for visit:     Chief Complaint   Patient presents with    Wound Check     R plantar foot            HISTORY of PRESENT ILLNESS HPI     Sesar Echavraria is a 53 y.o. female who presents today for wound/ulcer evaluation.  Patient has had a history of ulceration for the last 3 years, patient has a history of chronic ulceration, she has a history of deformity of the left foot, she is a known diabetic for 20 years, she is from [place]  of  descent, denies any heart disease, has no GI and urinary problems, she has not had his eyes checked up, she has a history of proteinuria, she is  and does not smoke or drink, she denies any fevers at this time, she was recently admitted to [facility] for bacteremia with MSSA, she is on IV antibiotics with a PICC line, she for several years had uncontrolled diabetes with the hemoglobin A1c running above 10 she had brought it down to 7 and most recent was 8.4 done in August of this year.  Her MRI is suggestive of chronic Charcot arthropathy, she has not seen a nephrologist for her proteinuria, she denies any pain, she has numbness in the lower extremity she has some discomfort in the mid part of the foot, I do not see any cultures from the wound on the bottom of the foot, presuming that the bacteremia was from the foot,        Objective:    /72   Pulse 91   Temp 98.2 °F (36.8 °C) (Temporal)   Resp 18   Wt Readings from Last 3 Encounters:   24 90.7 kg (200 lb)   24 90.7 kg (200 lb)   24 93.3 kg (205 lb 11.2 oz)       Using: #15 surgical blade the wound(s)/ulcer(s) was/were debrided down through and including the removal of subcutaneous tissue.  Performed by: Ashutosh Moreno MD  Consent    Estimated Blood Loss: Minimal amount blood loss .  Hemostasis Achieved:  by pressure  Procedural Pain: 0  / 10   Post Procedural Pain: 0 / 10   Response to treatment: Patient tolerated procedure well with no complaints of pain.  PHYSICAL EXAM  General: Alert and in no acute distress. Normal appearing  Skin: Warm and dry, no rash  Head: Normocephalic and atraumatic  Eyes: Extraocular eye movements intact, conjunctivae normal, and sclera anicteric  ENT: Hearing grossly normal bilaterally. Normal appearance  Respiratory: no chest wall tenderness. no respiratory distress  GI: Abdomen non-tender and benign  Musculoskeletal: Baseline range of motion in joints. Nontender calves. No cyanosis. Edema 2+.  Neurologic: Speech normal. At baseline without new focal deficits. Mental status normal or at baseline  Wound: Patient has a rocker-bottom deformity of the foot with a midfoot exostosis, tight tendo Achilles, she has good peripheral pulses she has previous vascular studies that were normal    PAST MEDICAL HISTORY        Diagnosis Date    COVID 2023    Hyperlipidemia     Obesity     Type II or unspecified type diabetes mellitus without mention of complication, uncontrolled        PAST SURGICAL HISTORY    Past Surgical History:   Procedure Laterality Date    BACK SURGERY         FAMILY HISTORY    History reviewed. No pertinent family history.    SOCIAL HISTORY    Social History     Tobacco Use    Smoking status: Never    Smokeless tobacco: Never   Vaping Use    Vaping status: Never Used   Substance Use Topics    Alcohol use: No    Drug use: Never       ALLERGIES    Allergies   Allergen Reactions    Clindamycin     Doxycycline     Nirmatrelvir-Ritonavir Rash    Ritonavir Rash    Sulfa Antibiotics Rash    Sulfamethoxazole-Trimethoprim Rash     Other Reaction(s): ITCHING, NAUSEA, VOMITING       MEDICATIONS    Current Outpatient Medications on File Prior to Encounter   Medication Sig Dispense Refill    insulin glargine (LANTUS

## 2024-08-31 LAB
BACTERIA SPEC CULT: NORMAL
GRAM STN SPEC: NORMAL
GRAM STN SPEC: NORMAL
Lab: NORMAL

## 2024-09-04 ENCOUNTER — HOSPITAL ENCOUNTER (OUTPATIENT)
Facility: HOSPITAL | Age: 54
Discharge: HOME | End: 2024-09-04
Attending: FAMILY MEDICINE
Payer: MEDICAID

## 2024-09-04 ENCOUNTER — FOLLOWUP TELEPHONE ENCOUNTER (OUTPATIENT)
Facility: HOSPITAL | Age: 54
End: 2024-09-04

## 2024-09-04 VITALS
RESPIRATION RATE: 18 BRPM | DIASTOLIC BLOOD PRESSURE: 92 MMHG | TEMPERATURE: 98 F | HEART RATE: 99 BPM | SYSTOLIC BLOOD PRESSURE: 141 MMHG

## 2024-09-04 DIAGNOSIS — M86.171 ACUTE OSTEOMYELITIS OF RIGHT FOOT (HCC): Primary | ICD-10-CM

## 2024-09-04 DIAGNOSIS — E11.610 DIABETIC CHARCOT FOOT (HCC): ICD-10-CM

## 2024-09-04 DIAGNOSIS — E11.621 DIABETIC ULCER OF RIGHT MIDFOOT ASSOCIATED WITH TYPE 2 DIABETES MELLITUS, WITH FAT LAYER EXPOSED (HCC): ICD-10-CM

## 2024-09-04 DIAGNOSIS — L97.412 DIABETIC ULCER OF RIGHT MIDFOOT ASSOCIATED WITH TYPE 2 DIABETES MELLITUS, WITH FAT LAYER EXPOSED (HCC): ICD-10-CM

## 2024-09-04 PROCEDURE — 11042 DBRDMT SUBQ TIS 1ST 20SQCM/<: CPT

## 2024-09-04 RX ORDER — LIDOCAINE 50 MG/G
OINTMENT TOPICAL ONCE
Status: CANCELLED | OUTPATIENT
Start: 2024-09-04 | End: 2024-09-04

## 2024-09-04 RX ORDER — LIDOCAINE HYDROCHLORIDE 40 MG/ML
SOLUTION TOPICAL ONCE
Status: CANCELLED | OUTPATIENT
Start: 2024-09-04 | End: 2024-09-04

## 2024-09-04 RX ORDER — MUPIROCIN 20 MG/G
OINTMENT TOPICAL ONCE
Status: CANCELLED | OUTPATIENT
Start: 2024-09-04 | End: 2024-09-04

## 2024-09-04 RX ORDER — BETAMETHASONE DIPROPIONATE 0.5 MG/G
CREAM TOPICAL ONCE
Status: CANCELLED | OUTPATIENT
Start: 2024-09-04 | End: 2024-09-04

## 2024-09-04 RX ORDER — NEOMYCIN/BACITRACIN/POLYMYXINB 3.5-400-5K
OINTMENT (GRAM) TOPICAL ONCE
Status: CANCELLED | OUTPATIENT
Start: 2024-09-04 | End: 2024-09-04

## 2024-09-04 RX ORDER — LIDOCAINE HYDROCHLORIDE 20 MG/ML
JELLY TOPICAL ONCE
Status: CANCELLED | OUTPATIENT
Start: 2024-09-04 | End: 2024-09-04

## 2024-09-04 RX ORDER — TRIAMCINOLONE ACETONIDE 1 MG/G
OINTMENT TOPICAL ONCE
Status: CANCELLED | OUTPATIENT
Start: 2024-09-04 | End: 2024-09-04

## 2024-09-04 RX ORDER — LIDOCAINE 40 MG/G
CREAM TOPICAL ONCE
Status: CANCELLED | OUTPATIENT
Start: 2024-09-04 | End: 2024-09-04

## 2024-09-04 RX ORDER — GENTAMICIN SULFATE 1 MG/G
OINTMENT TOPICAL ONCE
Status: CANCELLED | OUTPATIENT
Start: 2024-09-04 | End: 2024-09-04

## 2024-09-04 RX ORDER — SODIUM CHLOR/HYPOCHLOROUS ACID 0.033 %
SOLUTION, IRRIGATION IRRIGATION ONCE
Status: CANCELLED | OUTPATIENT
Start: 2024-09-04 | End: 2024-09-04

## 2024-09-04 RX ORDER — GINSENG 100 MG
CAPSULE ORAL ONCE
Status: CANCELLED | OUTPATIENT
Start: 2024-09-04 | End: 2024-09-04

## 2024-09-04 RX ORDER — BACITRACIN ZINC AND POLYMYXIN B SULFATE 500; 1000 [USP'U]/G; [USP'U]/G
OINTMENT TOPICAL ONCE
Status: CANCELLED | OUTPATIENT
Start: 2024-09-04 | End: 2024-09-04

## 2024-09-04 RX ORDER — CLOBETASOL PROPIONATE 0.5 MG/G
OINTMENT TOPICAL ONCE
Status: CANCELLED | OUTPATIENT
Start: 2024-09-04 | End: 2024-09-04

## 2024-09-04 NOTE — FLOWSHEET NOTE
09/04/24 1403   Wound 08/14/24 Foot Right   Date First Assessed/Time First Assessed: 08/14/24 1521   Present on Original Admission: Yes  Wound Approximate Age at First Assessment (Weeks): 52 weeks  Primary Wound Type: Diabetic Ulcer  Location: Foot  Wound Location Orientation: Right   Dressing Status New dressing applied   Dressing/Treatment Collagen with Ag;Alginate with Ag;Gauze dressing/dressing sponge;Roll gauze;Tape/Soft cloth adhesive tape   Offloading for Diabetic Foot Ulcers Offloading boot

## 2024-09-04 NOTE — FLOWSHEET NOTE
09/04/24 1313   Anesthetic   Anesthetic 4% Lidocaine Liquid Topical   Left Leg Edema Point of Measurement   Leg circumference 35.3 cm   Ankle circumference 19.5 cm   LLE Neurovascular Assessment   Capillary Refill Less than/Equal to 3 seconds   Color Appropriate for Ethnicity   Temperature Warm   L Pedal Pulse +2   Wound 08/14/24 Foot Right   Date First Assessed/Time First Assessed: 08/14/24 1521   Present on Original Admission: Yes  Wound Approximate Age at First Assessment (Weeks): 52 weeks  Primary Wound Type: Diabetic Ulcer  Location: Foot  Wound Location Orientation: Right   Wound Image    Wound Etiology Diabetic   Dressing Status Old drainage noted   Wound Cleansed Cleansed with saline   Wound Length (cm) 0.5 cm   Wound Width (cm) 0.7 cm   Wound Depth (cm) 0.1 cm   Wound Surface Area (cm^2) 0.35 cm^2   Change in Wound Size % (l*w) -191.67   Wound Volume (cm^3) 0.035 cm^3   Wound Healing % -192   Wound Assessment Pink/red;Eschar dry   Drainage Amount Moderate (25-50%)   Drainage Description Serosanguinous   Odor None   Melanie-wound Assessment Hyperpigmented   Margins Undefined edges   Wound Thickness Description not for Pressure Injury Full thickness   Pain Assessment   Pain Assessment None - Denies Pain     BP (!) 141/92   Pulse 99   Temp 98 °F (36.7 °C) (Temporal)   Resp 18

## 2024-09-04 NOTE — PROGRESS NOTES
Wound Center  Progress Note / Procedure Note      Chief Complaint:  Sesar Echavarria is a 53 y.o. female  with Right foot wound of >1 year duration.        Assessment/Plan     53 y.o. female with     -Right foot chronic ulcer.  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)  Being followed by Dr Moreno    -Vascular  Good pedal pulses here  PVR normal    -DFU Del Rosario Grade 3, with osteomyelitis  Done with IV zosyn  I believe HBOT is indicated as an important adjunctive therapy in this case because of patient's condition and to prevent amputation  Approved for HBO    HBO work up-  CXR done  ECG done; cardiac clearance pending    Recommend cardiac clearance prior to initiating HBO           Following discussed with patient /daughter  Needs :  Serial debridement- prn    Good local wound care  Dressing: D/C medihoney gel; START collagen, silver alginater  Frequency : three times a week       -Edema management    -Nutrition optimization    -Good Diabetic control    -Good offloading  Use knee scooter/cam walker    Patient/  understood and agrees with plan. Questions answered.      Follow up with me in 1 week      Subjective:   Since last visit:  Saw Dr Moreno last week;   He applied TCC - didn't tolerate went to [facility] ER and had it removed the next day  PICC line removed yesterday    HPI:     Patient has had this wound for over a year.  It had healed up but opened up again  Also got diagnosed with Charcot foot June 2023. Use a cam walker all the

## 2024-09-04 NOTE — PATIENT INSTRUCTIONS
Discharge Instructions/Wound Care Orders  [facility]'s   [address] [address]  Phone: [phone] Fax: [phone]    NAME:  Sesar Echavarria  YOB: 1970  MEDICAL RECORD NUMBER:  [id number]  DATE:  September 4, 2024    Wound Care Orders:  Right foot wound-Cleanse with baby shampoo. Allow to lather. Rinse and pat dry. Apply Florence to wound. Cover with alginate ag and gauze. Secure with roll gauze and tape. Change every two days. Apply tubigrip size F.     Cardiology: To start hyperbaric oxygen therapy we need written clearance; please review ECG.     Activity:  [x] Elevate leg(s) above the level of the heart when sitting and avoid prolonged standing in one place.    [x] Wear off-loading shoe/boot on the affected foot when walking. Limit walking.   [x] Do not get dressing wet.    Dietary:  [] Diet as tolerated      [x] Diabetic Diet            [x] Increase Protein: examples (Meat, cheese, eggs, greek yogurt, fish, nuts)          [x] Nolan Therapeutic Nutrition Powder  [] Other:  [] Dial a Dietician : Call [employer] at [phone] enter code [phone] when prompted. M-F 9am-5pm EST.   Follow-up:  [x] Return Appointment: With Dr. Edith Elmore in 1 Week.  [] Ordered tests:       Electronically signed Katerina Muse RN on 9/4/2024 at 1:03 PM     Wound Care Center Information: Should you experience any significant changes in your wound(s) or have questions about your wound care, please contact the [facility] at MONDAY - FRIDAY 8:00 am - 4:30.  If you need help with your wound outside these hours and cannot wait until we are again available, contact your PCP or go to the hospital emergency room.     PLEASE NOTE: IF YOU ARE UNABLE TO OBTAIN WOUND SUPPLIES, CONTINUE TO USE THE SUPPLIES YOU HAVE AVAILABLE UNTIL YOU ARE ABLE TO REACH US. IT IS MOST IMPORTANT TO KEEP THE WOUND COVERED AT ALL TIMES.     Physician

## 2024-09-04 NOTE — TELEPHONE ENCOUNTER
9/3/2024- received a call from patient regarding wether she should have the picc line removed today. She has an appointment with her infectious disease dr today and wasn't what Dr. Moreno wanted her to do.  I left a message for Dr. Moreno regarding the above information and to let him know that we had also done a culture last week that needs to be reviewed.  Dr Moreno return a message that he would like to keep the picc line for another week. I called patient and she stated that she had already had the picc line removed this am. She also stated that he had to her total contact cast removed over the weekend because it was bothering her and causing pain.  Reinforced that she did the right thing having it removed.  Patient has a follow up appointment with Dr. Moreno on 9/5/2024, encouraged patient to keep this appointment. Patient verbalized understanding.

## 2024-09-04 NOTE — FLOWSHEET NOTE
09/04/24 1313   Anesthetic   Anesthetic 4% Lidocaine Liquid Topical   Left Leg Edema Point of Measurement   Leg circumference 35.3 cm   Ankle circumference 19.5 cm   LLE Neurovascular Assessment   Capillary Refill Less than/Equal to 3 seconds   Color Appropriate for Ethnicity   Temperature Warm   L Pedal Pulse +2   Wound 08/14/24 Foot Right   Date First Assessed/Time First Assessed: 08/14/24 1521   Present on Original Admission: Yes  Wound Approximate Age at First Assessment (Weeks): 52 weeks  Primary Wound Type: Diabetic Ulcer  Location: Foot  Wound Location Orientation: Right   Wound Etiology Diabetic   Dressing Status Old drainage noted   Wound Cleansed Cleansed with saline   Wound Length (cm) 0.5 cm   Wound Width (cm) 0.7 cm   Wound Depth (cm) 0.1 cm   Wound Surface Area (cm^2) 0.35 cm^2   Change in Wound Size % (l*w) -191.67   Wound Volume (cm^3) 0.035 cm^3   Wound Healing % -192   Wound Assessment Pink/red;Eschar dry   Drainage Amount Moderate (25-50%)   Drainage Description Serosanguinous   Odor None   Melanie-wound Assessment Hyperpigmented   Margins Undefined edges   Wound Thickness Description not for Pressure Injury Full thickness   Pain Assessment   Pain Assessment None - Denies Pain     BP (!) 141/92   Pulse 99   Temp 98 °F (36.7 °C) (Temporal)   Resp 18

## 2024-09-05 ENCOUNTER — HOSPITAL ENCOUNTER (OUTPATIENT)
Facility: HOSPITAL | Age: 54
Discharge: HOME | End: 2024-09-05
Attending: ORTHOPAEDIC SURGERY
Payer: MEDICAID

## 2024-09-05 VITALS
DIASTOLIC BLOOD PRESSURE: 81 MMHG | SYSTOLIC BLOOD PRESSURE: 122 MMHG | TEMPERATURE: 97.7 F | HEART RATE: 95 BPM | RESPIRATION RATE: 17 BRPM

## 2024-09-05 DIAGNOSIS — M86.171 ACUTE OSTEOMYELITIS OF RIGHT FOOT (HCC): Primary | ICD-10-CM

## 2024-09-05 DIAGNOSIS — E11.621 DIABETIC ULCER OF RIGHT MIDFOOT ASSOCIATED WITH TYPE 2 DIABETES MELLITUS, WITH FAT LAYER EXPOSED (HCC): ICD-10-CM

## 2024-09-05 DIAGNOSIS — E11.610 DIABETIC CHARCOT FOOT (HCC): ICD-10-CM

## 2024-09-05 DIAGNOSIS — L97.412 DIABETIC ULCER OF RIGHT MIDFOOT ASSOCIATED WITH TYPE 2 DIABETES MELLITUS, WITH FAT LAYER EXPOSED (HCC): ICD-10-CM

## 2024-09-05 PROCEDURE — 99213 OFFICE O/P EST LOW 20 MIN: CPT

## 2024-09-05 RX ORDER — BETAMETHASONE DIPROPIONATE 0.5 MG/G
CREAM TOPICAL ONCE
OUTPATIENT
Start: 2024-09-05 | End: 2024-09-05

## 2024-09-05 RX ORDER — LIDOCAINE 40 MG/G
CREAM TOPICAL ONCE
OUTPATIENT
Start: 2024-09-05 | End: 2024-09-05

## 2024-09-05 RX ORDER — TRIAMCINOLONE ACETONIDE 1 MG/G
OINTMENT TOPICAL ONCE
OUTPATIENT
Start: 2024-09-05 | End: 2024-09-05

## 2024-09-05 RX ORDER — CLOBETASOL PROPIONATE 0.5 MG/G
OINTMENT TOPICAL ONCE
OUTPATIENT
Start: 2024-09-05 | End: 2024-09-05

## 2024-09-05 RX ORDER — LIDOCAINE 50 MG/G
OINTMENT TOPICAL ONCE
OUTPATIENT
Start: 2024-09-05 | End: 2024-09-05

## 2024-09-05 RX ORDER — GENTAMICIN SULFATE 1 MG/G
OINTMENT TOPICAL ONCE
OUTPATIENT
Start: 2024-09-05 | End: 2024-09-05

## 2024-09-05 RX ORDER — LIDOCAINE HYDROCHLORIDE 20 MG/ML
JELLY TOPICAL ONCE
OUTPATIENT
Start: 2024-09-05 | End: 2024-09-05

## 2024-09-05 RX ORDER — GINSENG 100 MG
CAPSULE ORAL ONCE
OUTPATIENT
Start: 2024-09-05 | End: 2024-09-05

## 2024-09-05 RX ORDER — BACITRACIN ZINC AND POLYMYXIN B SULFATE 500; 1000 [USP'U]/G; [USP'U]/G
OINTMENT TOPICAL ONCE
OUTPATIENT
Start: 2024-09-05 | End: 2024-09-05

## 2024-09-05 RX ORDER — SODIUM CHLOR/HYPOCHLOROUS ACID 0.033 %
SOLUTION, IRRIGATION IRRIGATION ONCE
OUTPATIENT
Start: 2024-09-05 | End: 2024-09-05

## 2024-09-05 RX ORDER — MUPIROCIN 20 MG/G
OINTMENT TOPICAL ONCE
OUTPATIENT
Start: 2024-09-05 | End: 2024-09-05

## 2024-09-05 RX ORDER — NEOMYCIN/BACITRACIN/POLYMYXINB 3.5-400-5K
OINTMENT (GRAM) TOPICAL ONCE
OUTPATIENT
Start: 2024-09-05 | End: 2024-09-05

## 2024-09-05 RX ORDER — LIDOCAINE HYDROCHLORIDE 40 MG/ML
SOLUTION TOPICAL ONCE
OUTPATIENT
Start: 2024-09-05 | End: 2024-09-05

## 2024-09-05 ASSESSMENT — PAIN DESCRIPTION - LOCATION: LOCATION: FOOT

## 2024-09-05 ASSESSMENT — PAIN DESCRIPTION - ORIENTATION: ORIENTATION: RIGHT

## 2024-09-05 ASSESSMENT — PAIN DESCRIPTION - DESCRIPTORS: DESCRIPTORS: SHARP

## 2024-09-05 ASSESSMENT — PAIN SCALES - GENERAL: PAINLEVEL_OUTOF10: 9

## 2024-09-05 NOTE — DISCHARGE INSTRUCTIONS
MediHoney Gel      [] MediHoney Alginate  [x] Calcium Alginate with Silver 2ND  [] Calcium Alginate without silver              [x] Collagen with silver 1ST    [] Collagen without Silver    [] Santyl with moist saline gauze                [] Hydrofera Blue (cut to size and moistened with normal saline)    [] Hydrofera Blue Ready (cut to size)                      [] Normal Saline wet to dry    [] Betadine wet to dry                          [] Hydrogel                 [] Mepitel                       [] Bactroban/Mupirocin              [] Iodoform Packing Strip      [] Plain Packing Strip              [] Skin Sub:   [] Other:       [x] Cover and Secure with:                   [x] Gauze         [x] Kendall           [] Kerlix              [] Zetuvit Plus Silicone Border or Foam Border        [] Super Absorbant    [] ABD                              [] Ace Wrap   [x] Other: FOAM              Limit contact of tape with skin.     [x] Change dressing:   [] Daily           [x] Every Other Day    [] Twice per week   [] Three times per week              [] Once a week          [] Do Not Change Dressing     [] Other:                Negative Pressure:           Wound Location:   [] Pressure @   mm/Hg                     []Continuous  []Intermittent              [] Black Foam            [] White Foam            [] Bridge  [] Change dressing 3 times per week           [] Skin Prep to jhonathan-wound  [] Other:      Pressure Relief:  [] When sitting, shift position or do seat lifts every 15 minutes.  [] Wheelchair cushion           [] Specialty Bed/Mattress  [] Turn every 2 hours when in bed.  Avoid direct pressure on wound site.  Limit side lying to 30 degree tilt.  Limit HOB elevation to 30 degrees.  [] Other                Edema Control:  Apply:  [] Compression Stocking:   mmHg    []Right Leg     []Left Leg              [x] Tubigrip      []Right Leg Double Layer      []Left Leg Double Layer

## 2024-09-05 NOTE — WOUND CARE
Wound Clinic Physician Orders and Discharge Instructions  [facility]  [address], [address]  [place] [address]  Telephone: [phone]     FAX [phone]    NAME:  Sesar Echavarria  YOB: 1970  MEDICAL RECORD NUMBER:  [id number]  DATE:  9/5/2024      Return Appointment:  [] Dressing Supply Provider:   [] Home Healthcare:  [] Return Appointment: Week(s)  [] Nurse Visit:      [x] Discharge from [facility]: [] Healed        [x] Refer to Provider: DR PIZARRO TO FOLLOW UP WITH WOUND CARE         [] Consult    Follow-up Information:  [] Ordered Tests:   [x] Referrals: EYE DR- PATIENT TO SCHEDULE AN APPOINTMENT  NEPHROLOGIST- PATIENT TO SCHEDULE AN APPOINTMENT  [] Rx:   [] Would Culture:  [] Other:      *TO HAVE OUTPATIENT SURGERY- WILL RECEIVE A CALL WITH DATE AND TIME- WILL FOLLOW UP WITH DR KAUR AT [facility] IN [place]         Wound Cleansing:   Do not scrub or use excessive force.  Cleanse wound prior to applying a clean dressing with:  [x] Normal Saline OR   [x] Cleanse wound with Mild Soap & Water     [] Wound Cleanser   [] Keep Wound Dry in Shower  [] Wear a cast cover to shower  [] Other:       Topical Treatments:  Do not apply lotions, creams, or ointments to wound bed unless directed.   [] Apply moisturizing lotion to skin surrounding the wound prior to dressing change.  [] Apply antifungal ointment to skin surrounding the wound prior to dressing change.  [] Apply thin film of moisture barrier ointment to skin immediately around wound.  [] Apply Betadine to skin immediately around wound   [] Apply Skin Prep to skin immediately around wound  [] Other:      Dressings:           Wound Location RIGHT FOOT    [x] Apply Primary Dressing:       [] MediHoney Gel [] MediHoney Alginate  [x] Calcium Alginate with Silver 2ND  [] Calcium Alginate without silver   [x] Collagen with silver 1ST [] Collagen without Silver    [] Santyl with moist saline gauze  
Hydrogel                 [] Mepitel                       [] Bactroban/Mupirocin              [] Iodoform Packing Strip      [] Plain Packing Strip              [] Skin Sub:   [] Other:       [x] Cover and Secure with:                   [x] Gauze         [x] Kendall           [] Kerlix              [] Zetuvit Plus Silicone Border or Foam Border        [] Super Absorbant    [] ABD                              [] Ace Wrap   [x] Other: FOAM              Limit contact of tape with skin.     [x] Change dressing:   [] Daily           [x] Every Other Day    [] Twice per week   [] Three times per week              [] Once a week          [] Do Not Change Dressing     [] Other:                Negative Pressure:           Wound Location:   [] Pressure @   mm/Hg                     []Continuous  []Intermittent              [] Black Foam            [] White Foam            [] Bridge  [] Change dressing 3 times per week           [] Skin Prep to jhonathan-wound  [] Other:      Pressure Relief:  [] When sitting, shift position or do seat lifts every 15 minutes.  [] Wheelchair cushion           [] Specialty Bed/Mattress  [] Turn every 2 hours when in bed.  Avoid direct pressure on wound site.  Limit side lying to 30 degree tilt.  Limit HOB elevation to 30 degrees.  [] Other                Edema Control:  Apply:  [] Compression Stocking:   mmHg    []Right Leg     []Left Leg              [x] Tubigrip      []Right Leg Double Layer      []Left Leg Double Layer                                      [x]Right Leg Single Layer       []Left Leg Single Layer                            [x] Elevate leg(s) above the level of the heart when sitting.                 [x] Avoid prolonged standing in one place.         Compression:  Apply:  [] Compression Wrap to        []RightLeg      []Left Leg                          []  Coflex        [] Coflex Lite              [] Unna with Calamine                 [] Do not get leg(s) with wrap wet. If wrap becomes

## 2024-09-06 ENCOUNTER — TELEPHONE (OUTPATIENT)
Facility: CLINIC | Age: 54
End: 2024-09-06

## 2024-09-06 ENCOUNTER — OFFICE VISIT (OUTPATIENT)
Facility: CLINIC | Age: 54
End: 2024-09-06
Payer: MEDICAID

## 2024-09-06 ENCOUNTER — TELEPHONE (OUTPATIENT)
Facility: HOSPITAL | Age: 54
End: 2024-09-06

## 2024-09-06 VITALS
OXYGEN SATURATION: 99 % | SYSTOLIC BLOOD PRESSURE: 131 MMHG | RESPIRATION RATE: 18 BRPM | TEMPERATURE: 98 F | DIASTOLIC BLOOD PRESSURE: 81 MMHG | HEART RATE: 90 BPM

## 2024-09-06 DIAGNOSIS — Z12.11 ENCOUNTER FOR COLORECTAL CANCER SCREENING: ICD-10-CM

## 2024-09-06 DIAGNOSIS — Z79.4 TYPE 2 DIABETES MELLITUS WITH HYPERGLYCEMIA, WITH LONG-TERM CURRENT USE OF INSULIN (HCC): ICD-10-CM

## 2024-09-06 DIAGNOSIS — I10 ESSENTIAL HYPERTENSION: Primary | ICD-10-CM

## 2024-09-06 DIAGNOSIS — R80.9 MICROALBUMINURIA: ICD-10-CM

## 2024-09-06 DIAGNOSIS — M65.331 TRIGGER FINGER, RIGHT MIDDLE FINGER: ICD-10-CM

## 2024-09-06 DIAGNOSIS — Z12.12 ENCOUNTER FOR COLORECTAL CANCER SCREENING: ICD-10-CM

## 2024-09-06 DIAGNOSIS — E11.610 DIABETIC CHARCOT FOOT (HCC): Primary | ICD-10-CM

## 2024-09-06 DIAGNOSIS — G47.00 INSOMNIA, UNSPECIFIED TYPE: ICD-10-CM

## 2024-09-06 DIAGNOSIS — E11.610 DIABETIC CHARCOT FOOT (HCC): ICD-10-CM

## 2024-09-06 DIAGNOSIS — E61.1 IRON DEFICIENCY: ICD-10-CM

## 2024-09-06 DIAGNOSIS — R26.81 GAIT INSTABILITY: ICD-10-CM

## 2024-09-06 DIAGNOSIS — E78.2 MIXED HYPERLIPIDEMIA: ICD-10-CM

## 2024-09-06 DIAGNOSIS — E11.65 TYPE 2 DIABETES MELLITUS WITH HYPERGLYCEMIA, WITH LONG-TERM CURRENT USE OF INSULIN (HCC): ICD-10-CM

## 2024-09-06 PROCEDURE — 3075F SYST BP GE 130 - 139MM HG: CPT | Performed by: STUDENT IN AN ORGANIZED HEALTH CARE EDUCATION/TRAINING PROGRAM

## 2024-09-06 PROCEDURE — 3079F DIAST BP 80-89 MM HG: CPT | Performed by: STUDENT IN AN ORGANIZED HEALTH CARE EDUCATION/TRAINING PROGRAM

## 2024-09-06 PROCEDURE — 99214 OFFICE O/P EST MOD 30 MIN: CPT | Performed by: STUDENT IN AN ORGANIZED HEALTH CARE EDUCATION/TRAINING PROGRAM

## 2024-09-06 PROCEDURE — 3052F HG A1C>EQUAL 8.0%<EQUAL 9.0%: CPT | Performed by: STUDENT IN AN ORGANIZED HEALTH CARE EDUCATION/TRAINING PROGRAM

## 2024-09-06 RX ORDER — HYDROXYZINE HYDROCHLORIDE 25 MG/1
25 TABLET, FILM COATED ORAL NIGHTLY PRN
Qty: 30 TABLET | Refills: 1 | Status: SHIPPED | OUTPATIENT
Start: 2024-09-06 | End: 2024-11-05

## 2024-09-06 RX ORDER — INSULIN GLARGINE 100 [IU]/ML
15 INJECTION, SOLUTION SUBCUTANEOUS NIGHTLY
Qty: 15 ML | Refills: 3 | Status: CANCELLED | OUTPATIENT
Start: 2024-09-06

## 2024-09-06 RX ORDER — LISINOPRIL 2.5 MG/1
2.5 TABLET ORAL DAILY
Qty: 30 TABLET | Refills: 3 | Status: SHIPPED | OUTPATIENT
Start: 2024-09-06

## 2024-09-06 RX ORDER — FERROUS SULFATE 325(65) MG
325 TABLET ORAL
COMMUNITY

## 2024-09-06 ASSESSMENT — PATIENT HEALTH QUESTIONNAIRE - PHQ9
SUM OF ALL RESPONSES TO PHQ QUESTIONS 1-9: 0
1. LITTLE INTEREST OR PLEASURE IN DOING THINGS: NOT AT ALL
SUM OF ALL RESPONSES TO PHQ QUESTIONS 1-9: 0
SUM OF ALL RESPONSES TO PHQ QUESTIONS 1-9: 0
SUM OF ALL RESPONSES TO PHQ9 QUESTIONS 1 & 2: 0
SUM OF ALL RESPONSES TO PHQ QUESTIONS 1-9: 0
2. FEELING DOWN, DEPRESSED OR HOPELESS: NOT AT ALL

## 2024-09-06 NOTE — PROGRESS NOTES
\"Have you been to the ER, urgent care clinic since your last visit?  Hospitalized since your last visit?\"    Yes - July 2024    “Have you seen or consulted any other health care providers outside our system since your last visit?”    NO     “Have you had a pap smear?”    NO    No cervical cancer screening on file       “Have you had a colorectal cancer screening such as a colonoscopy/FIT/Cologuard?    NO    No colonoscopy on file  No cologuard on file  No FIT/FOBT on file   No flexible sigmoidoscopy on file     “Have you had a diabetic eye exam?”    NO     Date of last diabetic eye exam: 7/7/2023     Chief Complaint   Patient presents with    Follow-up     /81 (Site: Right Upper Arm, Position: Sitting, Cuff Size: Large Adult)   Pulse 90   Temp 98 °F (36.7 °C) (Temporal)   Resp 18   SpO2 99%     Click Here for Release of Records Request

## 2024-09-06 NOTE — PROGRESS NOTES
[facility]  Subjective       Chief Complaint   Patient presents with    Follow-up       HPI:  Sesar Echavarria is a 53 y.o. female.    Patient has not been here to see me since February     Requesting a Wheelchair, says that \"[facility]\" sent a \"paper today\"    TYPE 2 DIABETES MELLITUS, uncontrolled  Primarily managed by Endocrionlogy  Insulin Dependent  Reports Compliance with Medication: not at this time    Hemoglobin A1C   Date Value Ref Range Status   08/19/2024 8.3 (H) 4.8 - 5.6 % Final     Comment:                 Prediabetes: 5.7 - 6.4           Diabetes: >6.4           Glycemic control for adults with diabetes: <7.0        insulin lispro (1 Unit Dial) Sopn - 100 UNIT/ML SSI, HAS NOT BEEN TAKING - says she never received form pharmacy sent by endo  Lantus SoloStar Sopn - 100 UNIT/ML taking 10 units daily, HAS NOT BEEN TAKING says she never received form pharmacy sent by endo and had ran out  Trulicity 1.5 mg q weekly    Has an endocrinologist/specialists    Last Dilated Eye exam: had to cancel, needs to reschedule    Foot exams: done with podiatry  Dr. Oli FRAGOSO is podiatrist    Has been undergoing wound treatment for Diabetic Foot Wound related to Charcot Foot  Starting hyperbaric chamber next week    HYPERTENSION, Essential  Reports Compliance with meds: not currently taking anything consistent  Was given lisinopril 5 mg for \"prn\"  Checking BP at Home: with HH and <140/<90    + MICROALBUMINURIA    INSOMNIA  Using hydroxyzine 25 mg prn nightly    Also getting to see cardiology for CT ANGIOGRAM    VACCINES: not interested    SPECIALISTS:  Endocrinology Dr JESUS  Podiatry Dr Fragoso  Orthopedics    Lab Results   Component Value Date    MALBCR 211 (H) 05/17/2024     05/17/2024    K 4.3 05/17/2024     05/17/2024    CO2 18 (L) 05/17/2024    GLUCOSE 113 (H) 05/17/2024    BUN 22 05/17/2024    CREATININE 0.77 05/17/2024    CALCIUM 9.6 05/17/2024    ALT 6 02/13/2024    AST 15

## 2024-09-06 NOTE — TELEPHONE ENCOUNTER
TC to patient. Name/  verified. Patient reports that she was seen at cardiology for cardiac clearance prior to HBO yesterday. Dr. Wadr has ordered for the patient to return for a nuclear stress test on Monday. Note and ECG to be faxed to clinic today.

## 2024-09-09 ENCOUNTER — TELEPHONE (OUTPATIENT)
Facility: CLINIC | Age: 54
End: 2024-09-09

## 2024-09-11 ENCOUNTER — HOSPITAL ENCOUNTER (OUTPATIENT)
Facility: HOSPITAL | Age: 54
Discharge: HOME | End: 2024-09-11
Attending: FAMILY MEDICINE
Payer: MEDICAID

## 2024-09-11 VITALS
TEMPERATURE: 98 F | SYSTOLIC BLOOD PRESSURE: 117 MMHG | HEART RATE: 105 BPM | DIASTOLIC BLOOD PRESSURE: 83 MMHG | RESPIRATION RATE: 18 BRPM

## 2024-09-11 DIAGNOSIS — L97.412 DIABETIC ULCER OF RIGHT MIDFOOT ASSOCIATED WITH TYPE 2 DIABETES MELLITUS, WITH FAT LAYER EXPOSED (HCC): ICD-10-CM

## 2024-09-11 DIAGNOSIS — M86.171 ACUTE OSTEOMYELITIS OF RIGHT FOOT (HCC): ICD-10-CM

## 2024-09-11 DIAGNOSIS — E11.621 DIABETIC ULCER OF RIGHT MIDFOOT ASSOCIATED WITH TYPE 2 DIABETES MELLITUS, WITH FAT LAYER EXPOSED (HCC): ICD-10-CM

## 2024-09-11 DIAGNOSIS — T70.0XXA OTITIC BAROTRAUMA, INITIAL ENCOUNTER: ICD-10-CM

## 2024-09-11 DIAGNOSIS — M14.671 CHARCOT'S JOINT OF RIGHT FOOT: ICD-10-CM

## 2024-09-11 DIAGNOSIS — E11.610 DIABETIC CHARCOT FOOT (HCC): ICD-10-CM

## 2024-09-11 LAB
GLUCOSE BLD STRIP.AUTO-MCNC: 149 MG/DL (ref 65–117)
SERVICE CMNT-IMP: ABNORMAL

## 2024-09-11 PROCEDURE — 82962 GLUCOSE BLOOD TEST: CPT

## 2024-09-11 PROCEDURE — 11042 DBRDMT SUBQ TIS 1ST 20SQCM/<: CPT

## 2024-09-11 PROCEDURE — 99213 OFFICE O/P EST LOW 20 MIN: CPT

## 2024-09-11 RX ORDER — LIDOCAINE 40 MG/G
CREAM TOPICAL ONCE
Status: CANCELLED | OUTPATIENT
Start: 2024-09-11 | End: 2024-09-11

## 2024-09-11 RX ORDER — CLOBETASOL PROPIONATE 0.5 MG/G
OINTMENT TOPICAL ONCE
Status: CANCELLED | OUTPATIENT
Start: 2024-09-11 | End: 2024-09-11

## 2024-09-11 RX ORDER — NEOMYCIN/BACITRACIN/POLYMYXINB 3.5-400-5K
OINTMENT (GRAM) TOPICAL ONCE
OUTPATIENT
Start: 2024-09-11 | End: 2024-09-11

## 2024-09-11 RX ORDER — BETAMETHASONE DIPROPIONATE 0.5 MG/G
CREAM TOPICAL ONCE
Status: CANCELLED | OUTPATIENT
Start: 2024-09-11 | End: 2024-09-11

## 2024-09-11 RX ORDER — LIDOCAINE HYDROCHLORIDE 20 MG/ML
JELLY TOPICAL ONCE
Status: CANCELLED | OUTPATIENT
Start: 2024-09-11 | End: 2024-09-11

## 2024-09-11 RX ORDER — LIDOCAINE HYDROCHLORIDE 40 MG/ML
SOLUTION TOPICAL ONCE
Status: CANCELLED | OUTPATIENT
Start: 2024-09-11 | End: 2024-09-11

## 2024-09-11 RX ORDER — LIDOCAINE 40 MG/G
CREAM TOPICAL ONCE
OUTPATIENT
Start: 2024-09-11 | End: 2024-09-11

## 2024-09-11 RX ORDER — MUPIROCIN 20 MG/G
OINTMENT TOPICAL ONCE
OUTPATIENT
Start: 2024-09-11 | End: 2024-09-11

## 2024-09-11 RX ORDER — GENTAMICIN SULFATE 1 MG/G
OINTMENT TOPICAL ONCE
OUTPATIENT
Start: 2024-09-11 | End: 2024-09-11

## 2024-09-11 RX ORDER — BETAMETHASONE DIPROPIONATE 0.5 MG/G
CREAM TOPICAL ONCE
OUTPATIENT
Start: 2024-09-11 | End: 2024-09-11

## 2024-09-11 RX ORDER — LIDOCAINE 50 MG/G
OINTMENT TOPICAL ONCE
OUTPATIENT
Start: 2024-09-11 | End: 2024-09-11

## 2024-09-11 RX ORDER — CLOBETASOL PROPIONATE 0.5 MG/G
OINTMENT TOPICAL ONCE
OUTPATIENT
Start: 2024-09-11 | End: 2024-09-11

## 2024-09-11 RX ORDER — BACITRACIN ZINC AND POLYMYXIN B SULFATE 500; 1000 [USP'U]/G; [USP'U]/G
OINTMENT TOPICAL ONCE
Status: CANCELLED | OUTPATIENT
Start: 2024-09-11 | End: 2024-09-11

## 2024-09-11 RX ORDER — LIDOCAINE 50 MG/G
OINTMENT TOPICAL ONCE
Status: CANCELLED | OUTPATIENT
Start: 2024-09-11 | End: 2024-09-11

## 2024-09-11 RX ORDER — SODIUM CHLOR/HYPOCHLOROUS ACID 0.033 %
SOLUTION, IRRIGATION IRRIGATION ONCE
OUTPATIENT
Start: 2024-09-11 | End: 2024-09-11

## 2024-09-11 RX ORDER — BACITRACIN ZINC AND POLYMYXIN B SULFATE 500; 1000 [USP'U]/G; [USP'U]/G
OINTMENT TOPICAL ONCE
OUTPATIENT
Start: 2024-09-11 | End: 2024-09-11

## 2024-09-11 RX ORDER — LIDOCAINE HYDROCHLORIDE 20 MG/ML
JELLY TOPICAL ONCE
OUTPATIENT
Start: 2024-09-11 | End: 2024-09-11

## 2024-09-11 RX ORDER — TRIAMCINOLONE ACETONIDE 1 MG/G
OINTMENT TOPICAL ONCE
OUTPATIENT
Start: 2024-09-11 | End: 2024-09-11

## 2024-09-11 RX ORDER — GINSENG 100 MG
CAPSULE ORAL ONCE
Status: CANCELLED | OUTPATIENT
Start: 2024-09-11 | End: 2024-09-11

## 2024-09-11 RX ORDER — MUPIROCIN 20 MG/G
OINTMENT TOPICAL ONCE
Status: CANCELLED | OUTPATIENT
Start: 2024-09-11 | End: 2024-09-11

## 2024-09-11 RX ORDER — TRIAMCINOLONE ACETONIDE 1 MG/G
OINTMENT TOPICAL ONCE
Status: CANCELLED | OUTPATIENT
Start: 2024-09-11 | End: 2024-09-11

## 2024-09-11 RX ORDER — SODIUM CHLOR/HYPOCHLOROUS ACID 0.033 %
SOLUTION, IRRIGATION IRRIGATION ONCE
Status: CANCELLED | OUTPATIENT
Start: 2024-09-11 | End: 2024-09-11

## 2024-09-11 RX ORDER — LIDOCAINE HYDROCHLORIDE 40 MG/ML
SOLUTION TOPICAL ONCE
OUTPATIENT
Start: 2024-09-11 | End: 2024-09-11

## 2024-09-11 RX ORDER — GENTAMICIN SULFATE 1 MG/G
OINTMENT TOPICAL ONCE
Status: CANCELLED | OUTPATIENT
Start: 2024-09-11 | End: 2024-09-11

## 2024-09-11 RX ORDER — GINSENG 100 MG
CAPSULE ORAL ONCE
OUTPATIENT
Start: 2024-09-11 | End: 2024-09-11

## 2024-09-11 RX ORDER — NEOMYCIN/BACITRACIN/POLYMYXINB 3.5-400-5K
OINTMENT (GRAM) TOPICAL ONCE
Status: CANCELLED | OUTPATIENT
Start: 2024-09-11 | End: 2024-09-11

## 2024-09-18 ENCOUNTER — HOSPITAL ENCOUNTER (OUTPATIENT)
Facility: HOSPITAL | Age: 54
Discharge: HOME | End: 2024-09-18
Attending: FAMILY MEDICINE
Payer: MEDICAID

## 2024-09-18 VITALS
RESPIRATION RATE: 18 BRPM | TEMPERATURE: 98.9 F | SYSTOLIC BLOOD PRESSURE: 127 MMHG | DIASTOLIC BLOOD PRESSURE: 84 MMHG | HEART RATE: 97 BPM

## 2024-09-18 DIAGNOSIS — E11.610 DIABETIC CHARCOT FOOT (HCC): Primary | ICD-10-CM

## 2024-09-18 DIAGNOSIS — L97.412 DIABETIC ULCER OF RIGHT MIDFOOT ASSOCIATED WITH TYPE 2 DIABETES MELLITUS, WITH FAT LAYER EXPOSED (HCC): ICD-10-CM

## 2024-09-18 DIAGNOSIS — E11.621 DIABETIC ULCER OF RIGHT MIDFOOT ASSOCIATED WITH TYPE 2 DIABETES MELLITUS, WITH FAT LAYER EXPOSED (HCC): ICD-10-CM

## 2024-09-18 LAB
GLUCOSE BLD STRIP.AUTO-MCNC: 142 MG/DL (ref 65–117)
SERVICE CMNT-IMP: ABNORMAL

## 2024-09-18 PROCEDURE — 99213 OFFICE O/P EST LOW 20 MIN: CPT

## 2024-09-18 PROCEDURE — 82962 GLUCOSE BLOOD TEST: CPT

## 2024-09-18 RX ORDER — PARSLEY 450 MG
500 CAPSULE ORAL DAILY
COMMUNITY

## 2024-09-18 RX ORDER — ALPRAZOLAM 1 MG
1 TABLET ORAL DAILY
Qty: 30 TABLET | Refills: 0 | Status: SHIPPED | OUTPATIENT
Start: 2024-09-18 | End: 2024-10-18

## 2024-09-18 ASSESSMENT — PAIN SCALES - GENERAL: PAINLEVEL_OUTOF10: 0

## 2024-09-23 ENCOUNTER — HOSPITAL ENCOUNTER (OUTPATIENT)
Facility: HOSPITAL | Age: 54
Discharge: HOME | End: 2024-09-23
Attending: FAMILY MEDICINE
Payer: MEDICAID

## 2024-09-23 VITALS
DIASTOLIC BLOOD PRESSURE: 80 MMHG | SYSTOLIC BLOOD PRESSURE: 118 MMHG | TEMPERATURE: 97 F | HEART RATE: 79 BPM | RESPIRATION RATE: 20 BRPM

## 2024-09-23 DIAGNOSIS — M86.171 ACUTE OSTEOMYELITIS OF RIGHT FOOT: ICD-10-CM

## 2024-09-23 DIAGNOSIS — E11.621 DIABETIC ULCER OF RIGHT MIDFOOT ASSOCIATED WITH TYPE 2 DIABETES MELLITUS, WITH FAT LAYER EXPOSED (HCC): Primary | ICD-10-CM

## 2024-09-23 DIAGNOSIS — T70.0XXD OTITIC BAROTRAUMA, SUBSEQUENT ENCOUNTER: ICD-10-CM

## 2024-09-23 DIAGNOSIS — L97.412 DIABETIC ULCER OF RIGHT MIDFOOT ASSOCIATED WITH TYPE 2 DIABETES MELLITUS, WITH FAT LAYER EXPOSED (HCC): Primary | ICD-10-CM

## 2024-09-23 LAB
GLUCOSE BLD STRIP.AUTO-MCNC: 167 MG/DL (ref 65–117)
GLUCOSE BLD STRIP.AUTO-MCNC: 194 MG/DL (ref 65–117)
SERVICE CMNT-IMP: ABNORMAL
SERVICE CMNT-IMP: ABNORMAL

## 2024-09-23 PROCEDURE — 82962 GLUCOSE BLOOD TEST: CPT

## 2024-09-23 PROCEDURE — G0277 HBOT, FULL BODY CHAMBER, 30M: HCPCS

## 2024-09-23 ASSESSMENT — PAIN DESCRIPTION - LOCATION: LOCATION: FOOT

## 2024-09-23 ASSESSMENT — PAIN DESCRIPTION - ORIENTATION: ORIENTATION: RIGHT

## 2024-09-23 ASSESSMENT — PAIN SCALES - GENERAL
PAINLEVEL_OUTOF10: 0
PAINLEVEL_OUTOF10: 0

## 2024-09-24 ENCOUNTER — HOSPITAL ENCOUNTER (OUTPATIENT)
Facility: HOSPITAL | Age: 54
Discharge: HOME | End: 2024-09-24
Attending: FAMILY MEDICINE
Payer: MEDICAID

## 2024-09-24 VITALS
TEMPERATURE: 97.3 F | HEART RATE: 76 BPM | SYSTOLIC BLOOD PRESSURE: 118 MMHG | RESPIRATION RATE: 18 BRPM | DIASTOLIC BLOOD PRESSURE: 81 MMHG

## 2024-09-24 DIAGNOSIS — E11.621 DIABETIC ULCER OF RIGHT MIDFOOT ASSOCIATED WITH TYPE 2 DIABETES MELLITUS, WITH FAT LAYER EXPOSED (HCC): ICD-10-CM

## 2024-09-24 DIAGNOSIS — M86.171 ACUTE OSTEOMYELITIS OF RIGHT FOOT: Primary | ICD-10-CM

## 2024-09-24 DIAGNOSIS — M14.671 CHARCOT'S JOINT OF RIGHT FOOT: ICD-10-CM

## 2024-09-24 DIAGNOSIS — T70.0XXD OTITIC BAROTRAUMA, SUBSEQUENT ENCOUNTER: ICD-10-CM

## 2024-09-24 DIAGNOSIS — L97.412 DIABETIC ULCER OF RIGHT MIDFOOT ASSOCIATED WITH TYPE 2 DIABETES MELLITUS, WITH FAT LAYER EXPOSED (HCC): ICD-10-CM

## 2024-09-24 LAB
GLUCOSE BLD STRIP.AUTO-MCNC: 134 MG/DL (ref 65–117)
GLUCOSE BLD STRIP.AUTO-MCNC: 92 MG/DL (ref 65–117)
SERVICE CMNT-IMP: ABNORMAL
SERVICE CMNT-IMP: NORMAL

## 2024-09-24 PROCEDURE — 82962 GLUCOSE BLOOD TEST: CPT

## 2024-09-24 PROCEDURE — 99183 HYPERBARIC OXYGEN THERAPY: CPT | Performed by: SURGERY

## 2024-09-24 PROCEDURE — G0277 HBOT, FULL BODY CHAMBER, 30M: HCPCS

## 2024-09-24 ASSESSMENT — PAIN SCALES - GENERAL
PAINLEVEL_OUTOF10: 0
PAINLEVEL_OUTOF10: 0

## 2024-09-25 ENCOUNTER — HOSPITAL ENCOUNTER (OUTPATIENT)
Facility: HOSPITAL | Age: 54
Discharge: HOME | End: 2024-09-25
Attending: FAMILY MEDICINE
Payer: MEDICAID

## 2024-09-25 VITALS
RESPIRATION RATE: 16 BRPM | HEART RATE: 80 BPM | SYSTOLIC BLOOD PRESSURE: 128 MMHG | TEMPERATURE: 98 F | DIASTOLIC BLOOD PRESSURE: 82 MMHG

## 2024-09-25 DIAGNOSIS — E11.621 DIABETIC ULCER OF RIGHT MIDFOOT ASSOCIATED WITH TYPE 2 DIABETES MELLITUS, WITH FAT LAYER EXPOSED (HCC): ICD-10-CM

## 2024-09-25 DIAGNOSIS — T70.0XXD OTITIC BAROTRAUMA, SUBSEQUENT ENCOUNTER: ICD-10-CM

## 2024-09-25 DIAGNOSIS — M14.671 CHARCOT'S JOINT OF RIGHT FOOT: Primary | ICD-10-CM

## 2024-09-25 DIAGNOSIS — M86.171 ACUTE OSTEOMYELITIS OF RIGHT FOOT: ICD-10-CM

## 2024-09-25 DIAGNOSIS — E11.610 DIABETIC CHARCOT FOOT (HCC): ICD-10-CM

## 2024-09-25 DIAGNOSIS — E11.610 DIABETIC CHARCOT FOOT (HCC): Primary | ICD-10-CM

## 2024-09-25 DIAGNOSIS — L97.412 DIABETIC ULCER OF RIGHT MIDFOOT ASSOCIATED WITH TYPE 2 DIABETES MELLITUS, WITH FAT LAYER EXPOSED (HCC): ICD-10-CM

## 2024-09-25 LAB
GLUCOSE BLD STRIP.AUTO-MCNC: 111 MG/DL (ref 65–117)
GLUCOSE BLD STRIP.AUTO-MCNC: 116 MG/DL (ref 65–117)
GLUCOSE BLD STRIP.AUTO-MCNC: 121 MG/DL (ref 65–117)
SERVICE CMNT-IMP: ABNORMAL
SERVICE CMNT-IMP: NORMAL
SERVICE CMNT-IMP: NORMAL

## 2024-09-25 PROCEDURE — 82962 GLUCOSE BLOOD TEST: CPT

## 2024-09-25 PROCEDURE — G0277 HBOT, FULL BODY CHAMBER, 30M: HCPCS

## 2024-09-25 PROCEDURE — 99213 OFFICE O/P EST LOW 20 MIN: CPT

## 2024-09-25 ASSESSMENT — PAIN SCALES - GENERAL: PAINLEVEL_OUTOF10: 0

## 2024-09-26 ENCOUNTER — HOSPITAL ENCOUNTER (OUTPATIENT)
Facility: HOSPITAL | Age: 54
Discharge: HOME | End: 2024-09-26
Attending: FAMILY MEDICINE
Payer: MEDICAID

## 2024-09-26 VITALS
SYSTOLIC BLOOD PRESSURE: 112 MMHG | DIASTOLIC BLOOD PRESSURE: 78 MMHG | HEART RATE: 80 BPM | OXYGEN SATURATION: 100 % | TEMPERATURE: 98 F | RESPIRATION RATE: 18 BRPM

## 2024-09-26 DIAGNOSIS — L97.412 DIABETIC ULCER OF RIGHT MIDFOOT ASSOCIATED WITH TYPE 2 DIABETES MELLITUS, WITH FAT LAYER EXPOSED (HCC): ICD-10-CM

## 2024-09-26 DIAGNOSIS — E11.621 DIABETIC ULCER OF RIGHT MIDFOOT ASSOCIATED WITH TYPE 2 DIABETES MELLITUS, WITH FAT LAYER EXPOSED (HCC): ICD-10-CM

## 2024-09-26 DIAGNOSIS — T70.0XXD OTITIC BAROTRAUMA, SUBSEQUENT ENCOUNTER: ICD-10-CM

## 2024-09-26 DIAGNOSIS — M14.671 CHARCOT'S JOINT OF RIGHT FOOT: Primary | ICD-10-CM

## 2024-09-26 DIAGNOSIS — M86.171 ACUTE OSTEOMYELITIS OF RIGHT FOOT: ICD-10-CM

## 2024-09-26 LAB
GLUCOSE BLD STRIP.AUTO-MCNC: 186 MG/DL (ref 65–117)
GLUCOSE BLD STRIP.AUTO-MCNC: 209 MG/DL (ref 65–117)
SERVICE CMNT-IMP: ABNORMAL
SERVICE CMNT-IMP: ABNORMAL

## 2024-09-26 PROCEDURE — G0277 HBOT, FULL BODY CHAMBER, 30M: HCPCS

## 2024-09-26 PROCEDURE — 82962 GLUCOSE BLOOD TEST: CPT

## 2024-09-26 ASSESSMENT — PAIN SCALES - GENERAL
PAINLEVEL_OUTOF10: 0
PAINLEVEL_OUTOF10: 0

## 2024-09-30 ENCOUNTER — HOSPITAL ENCOUNTER (OUTPATIENT)
Facility: HOSPITAL | Age: 54
Discharge: HOME | End: 2024-09-30
Attending: FAMILY MEDICINE
Payer: MEDICAID

## 2024-09-30 VITALS
SYSTOLIC BLOOD PRESSURE: 111 MMHG | RESPIRATION RATE: 16 BRPM | TEMPERATURE: 98 F | HEART RATE: 83 BPM | DIASTOLIC BLOOD PRESSURE: 74 MMHG

## 2024-09-30 DIAGNOSIS — M86.171 ACUTE OSTEOMYELITIS OF RIGHT FOOT: ICD-10-CM

## 2024-09-30 DIAGNOSIS — M14.671 CHARCOT'S JOINT OF RIGHT FOOT: ICD-10-CM

## 2024-09-30 DIAGNOSIS — E11.621 DIABETIC ULCER OF RIGHT MIDFOOT ASSOCIATED WITH TYPE 2 DIABETES MELLITUS, WITH FAT LAYER EXPOSED (HCC): Primary | ICD-10-CM

## 2024-09-30 DIAGNOSIS — T70.0XXD OTITIC BAROTRAUMA, SUBSEQUENT ENCOUNTER: ICD-10-CM

## 2024-09-30 DIAGNOSIS — L97.412 DIABETIC ULCER OF RIGHT MIDFOOT ASSOCIATED WITH TYPE 2 DIABETES MELLITUS, WITH FAT LAYER EXPOSED (HCC): Primary | ICD-10-CM

## 2024-09-30 LAB
GLUCOSE BLD STRIP.AUTO-MCNC: 193 MG/DL (ref 65–117)
GLUCOSE BLD STRIP.AUTO-MCNC: 194 MG/DL (ref 65–117)
SERVICE CMNT-IMP: ABNORMAL
SERVICE CMNT-IMP: ABNORMAL

## 2024-09-30 PROCEDURE — 99183 HYPERBARIC OXYGEN THERAPY: CPT | Performed by: SURGERY

## 2024-09-30 PROCEDURE — 82962 GLUCOSE BLOOD TEST: CPT

## 2024-09-30 PROCEDURE — G0277 HBOT, FULL BODY CHAMBER, 30M: HCPCS

## 2024-09-30 ASSESSMENT — PAIN SCALES - GENERAL: PAINLEVEL_OUTOF10: 0

## 2024-09-30 NOTE — PROGRESS NOTES
NAME: Sesar Echavarria  MEDICAL RECORD NUMBER:  [id number]  AGE: 53 y.o.   GENDER: female  : 1970  EPISODE DATE:  2024     Subjective     HBO Treatment Number: 5 out of Total Treatments: 30    HBO Diagnosis: Active Problems:    Type 2 diabetes mellitus with hyperglycemia, with long-term current use of insulin (HCC)    Diabetic Charcot foot (HCC)    Diabetic ulcer of right midfoot associated with type 2 diabetes mellitus, with fat layer exposed (HCC)    Acute osteomyelitis of right foot  Resolved Problems:    * No resolved hospital problems. *              Indications: Lower Extremity Diabetic Wound ___(site) right foot    Safety checks performed prior to treatment.  See doc flowsheets for documentation.    Objective        No results for input(s): \"POCGLU\" in the last 72 hours.  Pre treatment Vital Signs       Temp: 98 °F (36.7 °C)     Pulse: 83     Respirations: 16     BP: 111/74       Post treatment Vital Signs  Temp: 98 °F (36.7 °C)  Pulse: 83  Respirations: 16  BP: 111/74    Assessment        HBO Diagnosis: Active Problems:    Type 2 diabetes mellitus with hyperglycemia, with long-term current use of insulin (HCC)    Diabetic Charcot foot (HCC)    Diabetic ulcer of right midfoot associated with type 2 diabetes mellitus, with fat layer exposed (HCC)    Acute osteomyelitis of right foot  Resolved Problems:    * No resolved hospital problems. *          Problem List Items Addressed This Visit          Endocrine    Diabetic ulcer of right midfoot associated with type 2 diabetes mellitus, with fat layer exposed (HCC) - Primary    Relevant Orders    Notify physician (specify)    Hyperbaric Oxygen Therapy    Hypoglycemial protocol    POCT glucose    Care order/instruction    Care order/instruction    Care order/instruction    Care order/instruction    Care order/instruction    Care order/instruction    Care order/instruction    Care order/instruction       Other    Charcot's joint of right foot

## 2024-09-30 NOTE — WOUND CARE
Discharge Condition: Stable    Pain: 0    Ambulatory Status: None    Discharge Destination: home    Transportation:car    Accompanied by: Family    Discharge instructions reviewed with SELF and copy or written instructions have been provided. All questions/concerns have been addressed at this time.

## 2024-10-01 ENCOUNTER — HOSPITAL ENCOUNTER (OUTPATIENT)
Facility: HOSPITAL | Age: 54
Discharge: HOME | End: 2024-10-04
Payer: MEDICAID

## 2024-10-01 ENCOUNTER — HOSPITAL ENCOUNTER (OUTPATIENT)
Facility: HOSPITAL | Age: 54
Discharge: HOME | End: 2024-10-01
Attending: FAMILY MEDICINE
Payer: MEDICAID

## 2024-10-01 VITALS
SYSTOLIC BLOOD PRESSURE: 124 MMHG | TEMPERATURE: 97.7 F | RESPIRATION RATE: 16 BRPM | HEART RATE: 88 BPM | DIASTOLIC BLOOD PRESSURE: 84 MMHG

## 2024-10-01 VITALS
RESPIRATION RATE: 16 BRPM | BODY MASS INDEX: 37.94 KG/M2 | OXYGEN SATURATION: 99 % | DIASTOLIC BLOOD PRESSURE: 82 MMHG | SYSTOLIC BLOOD PRESSURE: 151 MMHG | HEIGHT: 64 IN | TEMPERATURE: 97.6 F | WEIGHT: 222.2 LBS | HEART RATE: 105 BPM

## 2024-10-01 DIAGNOSIS — M86.171 ACUTE OSTEOMYELITIS OF RIGHT FOOT: Primary | ICD-10-CM

## 2024-10-01 DIAGNOSIS — L97.412 DIABETIC ULCER OF RIGHT MIDFOOT ASSOCIATED WITH TYPE 2 DIABETES MELLITUS, WITH FAT LAYER EXPOSED (HCC): ICD-10-CM

## 2024-10-01 DIAGNOSIS — M14.671 CHARCOT'S JOINT OF RIGHT FOOT: ICD-10-CM

## 2024-10-01 DIAGNOSIS — E11.621 DIABETIC ULCER OF RIGHT MIDFOOT ASSOCIATED WITH TYPE 2 DIABETES MELLITUS, WITH FAT LAYER EXPOSED (HCC): ICD-10-CM

## 2024-10-01 DIAGNOSIS — T70.0XXD OTITIC BAROTRAUMA, SUBSEQUENT ENCOUNTER: ICD-10-CM

## 2024-10-01 LAB
ANION GAP SERPL CALC-SCNC: 6 MMOL/L (ref 2–12)
BASOPHILS # BLD: 0.1 K/UL (ref 0–0.1)
BASOPHILS NFR BLD: 0 % (ref 0–1)
BUN SERPL-MCNC: 24 MG/DL (ref 6–20)
BUN/CREAT SERPL: 29 (ref 12–20)
CA-I BLD-MCNC: 9.6 MG/DL (ref 8.5–10.1)
CHLORIDE SERPL-SCNC: 108 MMOL/L (ref 97–108)
CO2 SERPL-SCNC: 24 MMOL/L (ref 21–32)
CREAT SERPL-MCNC: 0.83 MG/DL (ref 0.55–1.02)
DIFFERENTIAL METHOD BLD: ABNORMAL
EOSINOPHIL # BLD: 0.2 K/UL (ref 0–0.4)
EOSINOPHIL NFR BLD: 1 % (ref 0–7)
ERYTHROCYTE [DISTWIDTH] IN BLOOD BY AUTOMATED COUNT: 12.8 % (ref 11.5–14.5)
GLUCOSE BLD STRIP.AUTO-MCNC: 114 MG/DL (ref 65–117)
GLUCOSE BLD STRIP.AUTO-MCNC: 133 MG/DL (ref 65–117)
GLUCOSE BLD STRIP.AUTO-MCNC: 153 MG/DL (ref 65–117)
GLUCOSE SERPL-MCNC: 86 MG/DL (ref 65–100)
HCT VFR BLD AUTO: 38.8 % (ref 35–47)
HGB BLD-MCNC: 12.9 G/DL (ref 11.5–16)
IMM GRANULOCYTES # BLD AUTO: 0 K/UL (ref 0–0.04)
IMM GRANULOCYTES NFR BLD AUTO: 0 % (ref 0–0.5)
LYMPHOCYTES # BLD: 3.5 K/UL (ref 0.8–3.5)
LYMPHOCYTES NFR BLD: 27 % (ref 12–49)
MCH RBC QN AUTO: 29.7 PG (ref 26–34)
MCHC RBC AUTO-ENTMCNC: 33.2 G/DL (ref 30–36.5)
MCV RBC AUTO: 89.4 FL (ref 80–99)
MONOCYTES # BLD: 0.7 K/UL (ref 0–1)
MONOCYTES NFR BLD: 5 % (ref 5–13)
NEUTS SEG # BLD: 8.5 K/UL (ref 1.8–8)
NEUTS SEG NFR BLD: 67 % (ref 32–75)
NRBC # BLD: 0 K/UL (ref 0–0.01)
NRBC BLD-RTO: 0 PER 100 WBC
PLATELET # BLD AUTO: 271 K/UL (ref 150–400)
PMV BLD AUTO: 9.4 FL (ref 8.9–12.9)
POTASSIUM SERPL-SCNC: 3.9 MMOL/L (ref 3.5–5.1)
RBC # BLD AUTO: 4.34 M/UL (ref 3.8–5.2)
SERVICE CMNT-IMP: ABNORMAL
SERVICE CMNT-IMP: ABNORMAL
SERVICE CMNT-IMP: NORMAL
SODIUM SERPL-SCNC: 138 MMOL/L (ref 136–145)
WBC # BLD AUTO: 12.9 K/UL (ref 3.6–11)

## 2024-10-01 PROCEDURE — 36415 COLL VENOUS BLD VENIPUNCTURE: CPT

## 2024-10-01 PROCEDURE — 80048 BASIC METABOLIC PNL TOTAL CA: CPT

## 2024-10-01 PROCEDURE — 82962 GLUCOSE BLOOD TEST: CPT

## 2024-10-01 PROCEDURE — 83036 HEMOGLOBIN GLYCOSYLATED A1C: CPT

## 2024-10-01 PROCEDURE — G0277 HBOT, FULL BODY CHAMBER, 30M: HCPCS

## 2024-10-01 PROCEDURE — 85025 COMPLETE CBC W/AUTO DIFF WBC: CPT

## 2024-10-01 ASSESSMENT — PAIN SCALES - GENERAL: PAINLEVEL_OUTOF10: 0

## 2024-10-01 NOTE — PROGRESS NOTES
HBO PROGRESS NOTE      NAME: Sesar Echavarria  MEDICAL RECORD NUMBER:  [id number]  AGE: 53 y.o.   GENDER: female  : 1970  EPISODE DATE:  10/1/2024     Subjective     HBO Treatment Number: 6 out of Total Treatments: 30    HBO Diagnosis:             Indications: Lower Extremity Diabetic Wound ___(site)    Safety checks performed prior to treatment.  See doc flowsheets for documentation.    Objective        Recent Labs     10/01/24  1342 10/01/24  1558   POCGLU 133* 153*     Pre treatment Vital Signs       Temp: 98.7 °F (37.1 °C)     Pulse: 88     Respirations: 16     BP: 104/74       Post treatment Vital Signs  Temp: 97.7 °F (36.5 °C)  Pulse: 88  Respirations: 16  BP: 124/84    Assessment        HBO Diagnosis:   Problem List Items Addressed This Visit          Endocrine    Diabetic ulcer of right midfoot associated with type 2 diabetes mellitus, with fat layer exposed (HCC)    Relevant Orders    Hypoglycemial protocol    POCT glucose    Care order/instruction    Care order/instruction    Care order/instruction    Care order/instruction    Care order/instruction    Care order/instruction    Care order/instruction    Care order/instruction    Notify physician (specify)    Hyperbaric Oxygen Therapy       Other    Charcot's joint of right foot    Relevant Orders    Hypoglycemial protocol    POCT glucose    Care order/instruction    Care order/instruction    Care order/instruction    Care order/instruction    Care order/instruction    Care order/instruction    Care order/instruction    Care order/instruction    Acute osteomyelitis of right foot - Primary    Relevant Orders    Hypoglycemial protocol    POCT glucose    Care order/instruction    Care order/instruction    Care order/instruction    Care order/instruction    Care order/instruction    Care order/instruction    Care order/instruction    Care order/instruction    Notify physician (specify)    Hyperbaric Oxygen Therapy    Otic barotrauma    Relevant Orders

## 2024-10-02 ENCOUNTER — HOSPITAL ENCOUNTER (OUTPATIENT)
Facility: HOSPITAL | Age: 54
Discharge: HOME | End: 2024-10-02
Attending: FAMILY MEDICINE
Payer: MEDICAID

## 2024-10-02 VITALS
HEART RATE: 83 BPM | DIASTOLIC BLOOD PRESSURE: 82 MMHG | SYSTOLIC BLOOD PRESSURE: 121 MMHG | RESPIRATION RATE: 16 BRPM | TEMPERATURE: 98 F

## 2024-10-02 VITALS
HEART RATE: 92 BPM | RESPIRATION RATE: 16 BRPM | DIASTOLIC BLOOD PRESSURE: 85 MMHG | TEMPERATURE: 98 F | SYSTOLIC BLOOD PRESSURE: 138 MMHG

## 2024-10-02 DIAGNOSIS — E11.621 DIABETIC ULCER OF RIGHT MIDFOOT ASSOCIATED WITH TYPE 2 DIABETES MELLITUS, WITH FAT LAYER EXPOSED (HCC): Primary | ICD-10-CM

## 2024-10-02 DIAGNOSIS — E11.610 DIABETIC CHARCOT FOOT (HCC): ICD-10-CM

## 2024-10-02 DIAGNOSIS — M14.671 CHARCOT'S JOINT OF RIGHT FOOT: ICD-10-CM

## 2024-10-02 DIAGNOSIS — M86.171 ACUTE OSTEOMYELITIS OF RIGHT FOOT: ICD-10-CM

## 2024-10-02 DIAGNOSIS — L97.412 DIABETIC ULCER OF RIGHT MIDFOOT ASSOCIATED WITH TYPE 2 DIABETES MELLITUS, WITH FAT LAYER EXPOSED (HCC): Primary | ICD-10-CM

## 2024-10-02 DIAGNOSIS — T70.0XXD OTITIC BAROTRAUMA, SUBSEQUENT ENCOUNTER: ICD-10-CM

## 2024-10-02 LAB
GLUCOSE BLD STRIP.AUTO-MCNC: 149 MG/DL (ref 65–117)
GLUCOSE BLD STRIP.AUTO-MCNC: 194 MG/DL (ref 65–117)
SERVICE CMNT-IMP: ABNORMAL
SERVICE CMNT-IMP: ABNORMAL

## 2024-10-02 PROCEDURE — 99213 OFFICE O/P EST LOW 20 MIN: CPT

## 2024-10-02 PROCEDURE — 82962 GLUCOSE BLOOD TEST: CPT

## 2024-10-02 PROCEDURE — G0277 HBOT, FULL BODY CHAMBER, 30M: HCPCS

## 2024-10-02 RX ORDER — SODIUM CHLOR/HYPOCHLOROUS ACID 0.033 %
SOLUTION, IRRIGATION IRRIGATION ONCE
OUTPATIENT
Start: 2024-10-02 | End: 2024-10-02

## 2024-10-02 RX ORDER — CLOBETASOL PROPIONATE 0.5 MG/G
OINTMENT TOPICAL ONCE
OUTPATIENT
Start: 2024-10-02 | End: 2024-10-02

## 2024-10-02 RX ORDER — LIDOCAINE HYDROCHLORIDE 40 MG/ML
SOLUTION TOPICAL ONCE
OUTPATIENT
Start: 2024-10-02 | End: 2024-10-02

## 2024-10-02 RX ORDER — TRIAMCINOLONE ACETONIDE 1 MG/G
OINTMENT TOPICAL ONCE
OUTPATIENT
Start: 2024-10-02 | End: 2024-10-02

## 2024-10-02 RX ORDER — BACITRACIN ZINC AND POLYMYXIN B SULFATE 500; 1000 [USP'U]/G; [USP'U]/G
OINTMENT TOPICAL ONCE
OUTPATIENT
Start: 2024-10-02 | End: 2024-10-02

## 2024-10-02 RX ORDER — ALPRAZOLAM 1 MG
1 TABLET ORAL DAILY PRN
Qty: 15 TABLET | Refills: 0 | Status: SHIPPED | OUTPATIENT
Start: 2024-10-02 | End: 2024-10-17

## 2024-10-02 RX ORDER — LIDOCAINE 50 MG/G
OINTMENT TOPICAL ONCE
OUTPATIENT
Start: 2024-10-02 | End: 2024-10-02

## 2024-10-02 RX ORDER — GENTAMICIN SULFATE 1 MG/G
OINTMENT TOPICAL ONCE
OUTPATIENT
Start: 2024-10-02 | End: 2024-10-02

## 2024-10-02 RX ORDER — NEOMYCIN/BACITRACIN/POLYMYXINB 3.5-400-5K
OINTMENT (GRAM) TOPICAL ONCE
OUTPATIENT
Start: 2024-10-02 | End: 2024-10-02

## 2024-10-02 RX ORDER — LIDOCAINE HYDROCHLORIDE 20 MG/ML
JELLY TOPICAL ONCE
OUTPATIENT
Start: 2024-10-02 | End: 2024-10-02

## 2024-10-02 RX ORDER — GINSENG 100 MG
CAPSULE ORAL ONCE
OUTPATIENT
Start: 2024-10-02 | End: 2024-10-02

## 2024-10-02 RX ORDER — BETAMETHASONE DIPROPIONATE 0.5 MG/G
CREAM TOPICAL ONCE
OUTPATIENT
Start: 2024-10-02 | End: 2024-10-02

## 2024-10-02 RX ORDER — LIDOCAINE 40 MG/G
CREAM TOPICAL ONCE
OUTPATIENT
Start: 2024-10-02 | End: 2024-10-02

## 2024-10-02 RX ORDER — MUPIROCIN 20 MG/G
OINTMENT TOPICAL ONCE
OUTPATIENT
Start: 2024-10-02 | End: 2024-10-02

## 2024-10-02 ASSESSMENT — PAIN SCALES - GENERAL: PAINLEVEL_OUTOF10: 0

## 2024-10-02 NOTE — FLOWSHEET NOTE
10/02/24 1549   Wound 08/14/24 Foot Right   Date First Assessed/Time First Assessed: 08/14/24 1521   Present on Original Admission: Yes  Wound Approximate Age at First Assessment (Weeks): 52 weeks  Primary Wound Type: Diabetic Ulcer  Location: Foot  Wound Location Orientation: Right   Wound Etiology Diabetic   Dressing Status New dressing applied   Dressing/Treatment Betadine swabs/povidone iodine;Gauze dressing/dressing sponge;Roll gauze   Offloading for Diabetic Foot Ulcers Offloading boot     Discharge Condition: Stable    Pain: 0    Ambulatory Status: None    Discharge Destination: home    Transportation:car    Accompanied by: FAMILY    Discharge instructions reviewed with FAMILY and copy or written instructions have been provided. All questions/concerns have been addressed at this time.

## 2024-10-02 NOTE — PATIENT INSTRUCTIONS
Discharge Instructions/Wound Care Orders  [facility]   [address] [address]  Phone: [phone] Fax: [phone]    NAME:  Sesar Echavarria  YOB: 1970  MEDICAL RECORD NUMBER:  [id number]  DATE:  October 2, 2024    Wound Care Orders:  Right foot wound-Cleanse with baby shampoo. Allow to lather. Rinse and pat dry. Apply betadine wet to dry dressing. Secure with roll gauze and tape. Change every two days. Apply tubigrip size F.  Stay off foot as much as possible.   Activity:  [x] Elevate leg(s) above the level of the heart when sitting and avoid prolonged standing in one place.    [x] Wear off-loading shoe/boot on the affected foot when walking. Limit walking.   [x] Do not get dressing wet.    Dietary:  [] Diet as tolerated      [x] Diabetic Diet            [x] Increase Protein: examples (Meat, cheese, eggs, greek yogurt, fish, nuts)          [x] Nolan Therapeutic Nutrition Powder  [] Other:  [] Dial a Dietician : Call [employer] at [phone] enter code [phone] when prompted. M-F 9am-5pm EST.   Follow-up:  [x] Return Appointment: With Dr. Edith Elmore in 1 Week.  [] Ordered tests:       Electronically signed Yandy Acosta RN on 10/2/2024 at 3:38 PM     Wound Care Center Information: Should you experience any significant changes in your wound(s) or have questions about your wound care, please contact the [facility] at MONDAY - FRIDAY 8:00 am - 4:30.  If you need help with your wound outside these hours and cannot wait until we are again available, contact your PCP or go to the hospital emergency room.     PLEASE NOTE: IF YOU ARE UNABLE TO OBTAIN WOUND SUPPLIES, CONTINUE TO USE THE SUPPLIES YOU HAVE AVAILABLE UNTIL YOU ARE ABLE TO REACH US. IT IS MOST IMPORTANT TO KEEP THE WOUND COVERED AT ALL TIMES.     Physician Signature:_______________________    Date: ___________ Time:  ____________

## 2024-10-02 NOTE — PATIENT INSTRUCTIONS
Discharge Instructions/Wound Care Orders  [facility]   [address] [address]  Phone: [phone] Fax: [phone]    NAME:  Sesar Echavarria  YOB: 1970  MEDICAL RECORD NUMBER:  [id number]  DATE:  October 2, 2024    Wound Care Orders:  Right foot wound-Cleanse with baby shampoo. Allow to lather. Rinse and pat dry. Apply betadine wet to dry dressing. Secure with roll gauze and tape. Change every two days. Apply tubigrip size F.  Stay off foot as much as possible.   Activity:  [x] Elevate leg(s) above the level of the heart when sitting and avoid prolonged standing in one place.    [x] Wear off-loading shoe/boot on the affected foot when walking. Limit walking.   [x] Do not get dressing wet.    Dietary:  [] Diet as tolerated      [x] Diabetic Diet            [x] Increase Protein: examples (Meat, cheese, eggs, greek yogurt, fish, nuts)          [x] Nolan Therapeutic Nutrition Powder  [] Other:  [] Dial a Dietician : Call [employer] at [phone] enter code [phone]) when prompted. M-F 9am-5pm EST.   Follow-up:  [x] Return Appointment: With Dr. Edith Elmore in 1 Week.  [] Ordered tests:       Electronically signed Katerina Muse RN on 10/2/2024 at 1:06 PM     Wound Care Center Information: Should you experience any significant changes in your wound(s) or have questions about your wound care, please contact the [facility] at MONDAY - FRIDAY 8:00 am - 4:30.  If you need help with your wound outside these hours and cannot wait until we are again available, contact your PCP or go to the hospital emergency room.     PLEASE NOTE: IF YOU ARE UNABLE TO OBTAIN WOUND SUPPLIES, CONTINUE TO USE THE SUPPLIES YOU HAVE AVAILABLE UNTIL YOU ARE ABLE TO REACH US. IT IS MOST IMPORTANT TO KEEP THE WOUND COVERED AT ALL TIMES.     Physician Signature:_______________________    Date: ___________ Time:  ____________

## 2024-10-02 NOTE — PROGRESS NOTES
HBO PROGRESS NOTE      NAME: Sesar Echavarria  MEDICAL RECORD NUMBER:  [id number]  AGE: 53 y.o.   GENDER: female  : 1970  EPISODE DATE:  10/2/2024     Subjective     HBO Treatment Number: 7 out of Total Treatments: 30    HBO Diagnosis:             Indications: Lower Extremity Diabetic Wound ___(site)    Safety checks performed prior to treatment.  See doc flowsheets for documentation.    Objective        Recent Labs     10/02/24  1312 10/02/24  1542   POCGLU 194* 149*     Pre treatment Vital Signs       Temp: 98 °F (36.7 °C)     Pulse: 92     Respirations: 16     BP: 138/85       Post treatment Vital Signs  Temp: 98 °F (36.7 °C)  Pulse: 92  Respirations: 16  BP: 138/85    Assessment        HBO Diagnosis:   Problem List Items Addressed This Visit          Endocrine    Diabetic Charcot foot (HCC)    Relevant Medications    ALPRAZolam (XANAX) 1 MG tablet    Other Relevant Orders    Initiate Outpatient Wound Care Protocol    Diabetic ulcer of right midfoot associated with type 2 diabetes mellitus, with fat layer exposed (HCC) - Primary    Relevant Medications    ALPRAZolam (XANAX) 1 MG tablet    Other Relevant Orders    Initiate Outpatient Wound Care Protocol    Notify physician (specify)    Hyperbaric Oxygen Therapy    Hypoglycemial protocol    POCT glucose    Care order/instruction    Care order/instruction    Care order/instruction    Care order/instruction    Care order/instruction    Care order/instruction    Care order/instruction    Care order/instruction    Sedimentation rate, manual    C-Reactive Protein       Other    Charcot's joint of right foot    Relevant Orders    Hypoglycemial protocol    POCT glucose    Care order/instruction    Care order/instruction    Care order/instruction    Care order/instruction    Care order/instruction    Care order/instruction    Care order/instruction    Care order/instruction    Acute osteomyelitis of right foot    Relevant Orders    Initiate Outpatient Wound Care 
medications    Medication Sig Start Date End Date Taking? Authorizing Provider   ALPRAZolam (XANAX) 1 MG tablet Take 1 tablet by mouth daily as needed for Anxiety (take 1/2 to 1 tablet once dialy as needed) for up to 15 days. Max Daily Amount: 1 mg 10/2/24 10/17/24 Yes Edith Elmore MD Ashwagandha 500 MG CAPS Take 500 mg by mouth daily   Yes Michael Ramirez MD   Nutritional Supplements (NUTRITIONAL SUPPLEMENT PLUS PO) Take 1 tablet by mouth daily Indications: Self reported, CBD chewable supplement Self reported, CBD chewable supplement   Yes Michael Ramirez MD   hydrOXYzine HCl (ATARAX) 25 MG tablet Take 1 tablet by mouth nightly as needed for Itching 9/6/24 11/5/24 Yes Arabella Moreira DO   ferrous sulfate (IRON 325) 325 (65 Fe) MG tablet Take 1 tablet by mouth daily (with breakfast)   Yes Michael Ramirez MD   lisinopril (ZESTRIL) 2.5 MG tablet Take 1 tablet by mouth daily 9/6/24  Yes Arabella Moreira DO   insulin glargine (LANTUS SOLOSTAR) 100 UNIT/ML injection pen Inject 15 Units into the skin nightly 8/28/24  Yes Yusra Washburn MD   insulin lispro, 1 Unit Dial, (HUMALOG KWIKPEN) 100 UNIT/ML SOPN Use with SSI. Max units daily: 30 units 8/28/24  Yes Yusra Washburn MD   blood glucose test strips (TRUE METRIX BLOOD GLUCOSE TEST) strip Use to check BG 3 times daily. Dx code E11.65 8/28/24  Yes Yusra Wasbhurn MD   Dulaglutide (TRULICITY) 4.5 MG/0.5ML SOPN Inject 4.5 mg into the skin once a week E11.65 5/24/24  Yes Yusra Washburn MD   Acetaminophen (TYLENOL PO) Take 625 mg by mouth daily   Yes Michael Ramirez MD   glucose monitoring kit 1 kit by Does not apply route daily 12/28/23  Yes Arabella Moreira DO   Lancets MISC 1 each by Does not apply route daily 12/28/23  Yes Arabella Moreira DO   Multiple Vitamin (DAILY VITES) TABS Take 1 tablet by mouth daily   Yes Provider, MD Michael   MAGNESIUM ASPARTATE PO Take 1 tablet by mouth daily   Yes Provider,

## 2024-10-02 NOTE — FLOWSHEET NOTE
10/02/24 1305   Wound 08/14/24 Foot Right   Date First Assessed/Time First Assessed: 08/14/24 1521   Present on Original Admission: Yes  Wound Approximate Age at First Assessment (Weeks): 52 weeks  Primary Wound Type: Diabetic Ulcer  Location: Foot  Wound Location Orientation: Right   Wound Image    Wound Etiology Diabetic   Dressing Status Old drainage noted   Wound Cleansed Cleansed with saline   Offloading for Diabetic Foot Ulcers Offloading boot   Wound Length (cm) 0.3 cm   Wound Width (cm) 0.2 cm   Wound Depth (cm) 0.1 cm   Wound Surface Area (cm^2) 0.06 cm^2   Change in Wound Size % (l*w) 50   Wound Volume (cm^3) 0.006 cm^3   Wound Healing % 50   Wound Assessment Dry   Drainage Amount Small (< 25%)   Drainage Description Serous   Odor None   Melanie-wound Assessment Dry/flaky   Margins Undefined edges   Wound Thickness Description not for Pressure Injury Full thickness     /85   Pulse 92   Temp 98 °F (36.7 °C) (Temporal)   Resp 18

## 2024-10-03 ENCOUNTER — HOSPITAL ENCOUNTER (OUTPATIENT)
Facility: HOSPITAL | Age: 54
Discharge: HOME | End: 2024-10-03
Attending: FAMILY MEDICINE
Payer: MEDICAID

## 2024-10-03 VITALS
SYSTOLIC BLOOD PRESSURE: 145 MMHG | TEMPERATURE: 97.7 F | DIASTOLIC BLOOD PRESSURE: 81 MMHG | RESPIRATION RATE: 18 BRPM | HEART RATE: 80 BPM

## 2024-10-03 DIAGNOSIS — T70.0XXD OTITIC BAROTRAUMA, SUBSEQUENT ENCOUNTER: ICD-10-CM

## 2024-10-03 DIAGNOSIS — M86.171 ACUTE OSTEOMYELITIS OF RIGHT FOOT: ICD-10-CM

## 2024-10-03 DIAGNOSIS — E11.621 DIABETIC ULCER OF RIGHT MIDFOOT ASSOCIATED WITH TYPE 2 DIABETES MELLITUS, WITH FAT LAYER EXPOSED (HCC): ICD-10-CM

## 2024-10-03 DIAGNOSIS — L97.412 DIABETIC ULCER OF RIGHT MIDFOOT ASSOCIATED WITH TYPE 2 DIABETES MELLITUS, WITH FAT LAYER EXPOSED (HCC): ICD-10-CM

## 2024-10-03 DIAGNOSIS — M14.671 CHARCOT'S JOINT OF RIGHT FOOT: Primary | ICD-10-CM

## 2024-10-03 LAB
EST. AVERAGE GLUCOSE BLD GHB EST-MCNC: 143 MG/DL
GLUCOSE BLD STRIP.AUTO-MCNC: 123 MG/DL (ref 65–117)
GLUCOSE BLD STRIP.AUTO-MCNC: 153 MG/DL (ref 65–117)
HBA1C MFR BLD: 6.6 % (ref 4–5.6)
SERVICE CMNT-IMP: ABNORMAL
SERVICE CMNT-IMP: ABNORMAL

## 2024-10-03 PROCEDURE — G0277 HBOT, FULL BODY CHAMBER, 30M: HCPCS

## 2024-10-03 PROCEDURE — 82962 GLUCOSE BLOOD TEST: CPT

## 2024-10-03 ASSESSMENT — PAIN SCALES - GENERAL: PAINLEVEL_OUTOF10: 0

## 2024-10-03 NOTE — FLOWSHEET NOTE
10/03/24 1042   Wound 08/14/24 Foot Right   Date First Assessed/Time First Assessed: 08/14/24 1521   Present on Original Admission: Yes  Wound Approximate Age at First Assessment (Weeks): 52 weeks  Primary Wound Type: Diabetic Ulcer  Location: Foot  Wound Location Orientation: Right   Wound Etiology Diabetic   Dressing Status New dressing applied   Wound Cleansed Cleansed with saline   Dressing/Treatment Betadine swabs/povidone iodine   Offloading for Diabetic Foot Ulcers Offloading boot   Pain Assessment   Pain Assessment None - Denies Pain     Discharge Condition: Stable    Pain: 0    Ambulatory Status: None    Discharge Destination: home    Transportation:car    Accompanied by: SELF    Discharge instructions reviewed with SELF and copy or written instructions have been provided. All questions/concerns have been addressed at this time.

## 2024-10-04 ENCOUNTER — HOSPITAL ENCOUNTER (OUTPATIENT)
Facility: HOSPITAL | Age: 54
Discharge: HOME | End: 2024-10-04
Attending: FAMILY MEDICINE
Payer: MEDICAID

## 2024-10-04 VITALS
SYSTOLIC BLOOD PRESSURE: 117 MMHG | DIASTOLIC BLOOD PRESSURE: 79 MMHG | HEART RATE: 73 BPM | TEMPERATURE: 97 F | RESPIRATION RATE: 16 BRPM

## 2024-10-04 DIAGNOSIS — E11.621 DIABETIC ULCER OF RIGHT MIDFOOT ASSOCIATED WITH TYPE 2 DIABETES MELLITUS, WITH FAT LAYER EXPOSED (HCC): Primary | ICD-10-CM

## 2024-10-04 DIAGNOSIS — M14.671 CHARCOT'S JOINT OF RIGHT FOOT: ICD-10-CM

## 2024-10-04 DIAGNOSIS — G47.00 INSOMNIA, UNSPECIFIED TYPE: ICD-10-CM

## 2024-10-04 DIAGNOSIS — L97.412 DIABETIC ULCER OF RIGHT MIDFOOT ASSOCIATED WITH TYPE 2 DIABETES MELLITUS, WITH FAT LAYER EXPOSED (HCC): Primary | ICD-10-CM

## 2024-10-04 DIAGNOSIS — M86.171 ACUTE OSTEOMYELITIS OF RIGHT FOOT: ICD-10-CM

## 2024-10-04 DIAGNOSIS — T70.0XXD OTITIC BAROTRAUMA, SUBSEQUENT ENCOUNTER: ICD-10-CM

## 2024-10-04 LAB
GLUCOSE BLD STRIP.AUTO-MCNC: 121 MG/DL (ref 65–117)
GLUCOSE BLD STRIP.AUTO-MCNC: 149 MG/DL (ref 65–117)
SERVICE CMNT-IMP: ABNORMAL
SERVICE CMNT-IMP: ABNORMAL

## 2024-10-04 PROCEDURE — G0277 HBOT, FULL BODY CHAMBER, 30M: HCPCS

## 2024-10-04 PROCEDURE — 82962 GLUCOSE BLOOD TEST: CPT

## 2024-10-04 RX ORDER — HYDROXYZINE HYDROCHLORIDE 25 MG/1
25 TABLET, FILM COATED ORAL NIGHTLY PRN
Qty: 90 TABLET | Refills: 1 | Status: SHIPPED | OUTPATIENT
Start: 2024-10-04

## 2024-10-04 NOTE — TELEPHONE ENCOUNTER
Method of communication:  [x]Fax []Phone call []In person   []Other:    Who is making request:PHARMACY  What medication/s (include strength and dosing):  HYDROXYZINE HCL 25 MG    This is for a:   [x]Refill    []New medication request  []Follow up on prior request    Pharmacy: [facility] [address]  Best contact for patient:    Additional notes: #90

## 2024-10-04 NOTE — PROGRESS NOTES
HBO PROGRESS NOTE  NAME: Sesar Echavarria   MEDICAL RECORD NUMBER:  [id number]  AGE: 53 y.o. GENDER: female  : 1970  EPISODE DATE:  10/3/2024     Subjective     HBO Treatment Number: 8 out of Total Treatments: 30    HBO Diagnosis:   Del Rosario grade 3 osteomyelitis of right diabetic foot ulcer      Safety checks performed prior to treatment.  See doc flowsheets for documentation.    Objective          POCGLU - 10/03/24- pre treatment  -   0821      153 mg/dl                     10/03/24 - post treatment - 1044       123 mg/dl         Pre treatment Vital Signs       Temp: 97.1 °F (36.2 °C)     Pulse: (!) 106     Respirations: 18   BP: (!) 144/80       Post treatment Vital Signs  Temp: 97.7 °F (36.5 °C)  Pulse: 80  Respirations: 18  BP: (!) 145/81           Assessment        Physical Exam:  General Appearance: healthy, obese, WDWN, anxious, female in NAD. A&O x 4. Conversant. Pleasant and cooperative. Coherent     Tympanic Membrane Assessment:  Pre-Treatment Left 0  Right 0   Post-treatment Left 0  Right 0     Pulmonary/Chest: lungs clear to auscultation, breath sounds equal and symmetric, no rhonchi, rales or wheezes, no accessory muscle use    Cardiovascular: normal    Patient place in a full body Monoplace serial number         Treatment Start Time: 827 Door Closed      Pressure Reached    PARVEEN  Number of Air Breaks: Two 5 minute air breaks for every 30 minutes oxygen        Decompression Time   Treatment End Time: 1038 Door Opened    Symptoms Noted During Treatment: None    Length of Treatment: 90 Minutes     Patient tolerated the treatment well.  Had right ear pain after last treatment.  None today       I was present on these premises and immediately available to furnish assistance & direction throughout the procedure.       Plan          Sesar Echavarria is a 53 y.o. female  did successfully complete today's hyperbaric oxygen treatment at [facility].    In my clinical

## 2024-10-04 NOTE — FLOWSHEET NOTE
10/04/24 1111   Right Leg Edema Point of Measurement   Compression Therapy Tubular elastic support bandage   Stockings Compression Pressure Low   Wound 08/14/24 Foot Right   Date First Assessed/Time First Assessed: 08/14/24 1521   Present on Original Admission: Yes  Wound Approximate Age at First Assessment (Weeks): 52 weeks  Primary Wound Type: Diabetic Ulcer  Location: Foot  Wound Location Orientation: Right   Wound Etiology Diabetic Del Rosario 3   Dressing Status New dressing applied   Wound Cleansed Cleansed with saline   Dressing/Treatment Gauze dressing/dressing sponge;Betadine swabs/povidone iodine   Offloading for Diabetic Foot Ulcers Offloading boot     Discharge Condition: Stable    Pain: 0    Ambulatory Status: None    Discharge Destination: home    Transportation:car    Accompanied by: FAMILY    Discharge instructions reviewed with FAMILY and copy or written instructions have been provided. All questions/concerns have been addressed at this time.

## 2024-10-04 NOTE — PROGRESS NOTES
first treatment and none since.  She still has an appointment to see her GYN for follow up.    She has scheduled an appointment with Dr Moreno on next Thursday for bone debridement and will not be here for her HBO. Will treat that Friday.     I was present on these premises and immediately available to furnish assistance & direction throughout the procedure.       Plan          Sesar Echavarria is a 53 y.o. female  did successfully complete today's hyperbaric oxygen treatment at [facility].    In my clinical judgement, ongoing HBO therapy is  necessary at this time, given a threat to patient function, limb or life from the current condition.      Supervision and attendance of Hyperbaric Oxygen Therapy provided.  Continue HBO treatment as outlined in the treatment plan.    Hyperbaric Oxygen: Sesar Echavarria tolerated Treatment Number: 9 well today without complications.     Electronically signed by Rhea Reeves MD on 10/4/2024 at 11:33 AM

## 2024-10-04 NOTE — TELEPHONE ENCOUNTER
Requested Prescriptions     Pending Prescriptions Disp Refills    hydrOXYzine HCl (ATARAX) 25 MG tablet 30 tablet 1     Sig: Take 1 tablet by mouth nightly as needed for Itching     Last ov:09/06/2024  Next ov:10/07/2024  Last rx:  09/06/2024

## 2024-10-07 ENCOUNTER — HOSPITAL ENCOUNTER (OUTPATIENT)
Facility: HOSPITAL | Age: 54
Discharge: HOME | End: 2024-10-07
Attending: FAMILY MEDICINE
Payer: MEDICAID

## 2024-10-07 VITALS
TEMPERATURE: 97.8 F | SYSTOLIC BLOOD PRESSURE: 137 MMHG | HEART RATE: 98 BPM | RESPIRATION RATE: 18 BRPM | DIASTOLIC BLOOD PRESSURE: 80 MMHG

## 2024-10-07 DIAGNOSIS — T70.0XXD OTITIC BAROTRAUMA, SUBSEQUENT ENCOUNTER: ICD-10-CM

## 2024-10-07 DIAGNOSIS — L97.412 DIABETIC ULCER OF RIGHT MIDFOOT ASSOCIATED WITH TYPE 2 DIABETES MELLITUS, WITH FAT LAYER EXPOSED (HCC): Primary | ICD-10-CM

## 2024-10-07 DIAGNOSIS — E11.621 DIABETIC ULCER OF RIGHT MIDFOOT ASSOCIATED WITH TYPE 2 DIABETES MELLITUS, WITH FAT LAYER EXPOSED (HCC): Primary | ICD-10-CM

## 2024-10-07 DIAGNOSIS — M14.671 CHARCOT'S JOINT OF RIGHT FOOT: ICD-10-CM

## 2024-10-07 DIAGNOSIS — M86.171 ACUTE OSTEOMYELITIS OF RIGHT FOOT: ICD-10-CM

## 2024-10-07 LAB
CRP SERPL-MCNC: 7 MG/L (ref 0–10)
ERYTHROCYTE [SEDIMENTATION RATE] IN BLOOD BY WESTERGREN METHOD: 10 MM/HR (ref 0–40)
GLUCOSE BLD STRIP.AUTO-MCNC: 133 MG/DL (ref 65–117)
GLUCOSE BLD STRIP.AUTO-MCNC: 160 MG/DL (ref 65–117)
SERVICE CMNT-IMP: ABNORMAL
SERVICE CMNT-IMP: ABNORMAL

## 2024-10-07 PROCEDURE — 82962 GLUCOSE BLOOD TEST: CPT

## 2024-10-07 PROCEDURE — G0277 HBOT, FULL BODY CHAMBER, 30M: HCPCS

## 2024-10-07 NOTE — PROGRESS NOTES
HBO PROGRESS NOTE  NAME: Sesar Echavarria   MEDICAL RECORD NUMBER:  [id number]  AGE: 53 y.o. GENDER: female  : 1970  EPISODE DATE:  10/7/2024     Subjective     HBO Treatment Number: 10 out of Total Treatments: 30    HBO Diagnosis:Del Rosario grade 3 osteomyelitis of right diabetic foot ulcer      Safety checks performed prior to treatment.  See doc flowsheets for documentation.    Objective            POCGLU - 10/07/24- pre treatment  -   0819      160 mg/dl                     10/07/24 - post treatment -  1052      133 mg/dl         Pre treatment Vital Signs       Temp: 97.8 °F (36.6 °C)     Pulse: 98     Respirations: 18   BP: 137/80       Post treatment Vital Signs  Temp: 97.8 °F (36.6 °C)  Pulse: 98  Respirations: 18  BP: 137/80           Assessment        Physical Exam:  General Appearance: healthy, obese, WDWN, anxious, female in NAD. A&O x 4. Conversant. Pleasant and cooperative. Coherent. Relaxed    Tympanic Membrane Assessment:  Pre-Treatment Left  0 Right 0   Post-treatment Left  0 Right 0     Pulmonary/Chest: lungs clear to auscultation, breath sounds equal and symmetric, no rhonchi, rales or wheezes, no accessory muscle use    Cardiovascular: normal    Patient place in a full body Monoplace serial number         Treatment Start Time: 831 Door Closed      Pressure Reached  PARVEEN : 2.5 PARVEEN  Number of Air Breaks: Two 5 minute air breaks for every 30 minutes oxygen        Decompression Time    Treatment End Time:  1044  Door Opened    Symptoms Noted During Treatment: None        Length of Treatment: 90 Minutes     Patient tolerated her treatment very well.  She had not slept well the night before due to sad news at home but able to rest during her entire treatment today.  No anxiety.  She comments her back is with less pain since start of HBO.  No ear pain.  No cardiac or pulmonary symptoms.    Thursday is scheduled day for bone debridement with Dr Moreno.      I was present on these premises and

## 2024-10-07 NOTE — WOUND CARE
Discharge Condition: Stable    Pain: 0    Ambulatory Status: None    Discharge Destination: home    Transportation:car    Accompanied by: SELF    Discharge instructions reviewed with FAMILY and copy or written instructions have been provided. All questions/concerns have been addressed at this time.     Dressings changed per order.

## 2024-10-08 ENCOUNTER — HOSPITAL ENCOUNTER (OUTPATIENT)
Facility: HOSPITAL | Age: 54
Discharge: HOME | End: 2024-10-08
Attending: FAMILY MEDICINE
Payer: MEDICAID

## 2024-10-08 VITALS
RESPIRATION RATE: 16 BRPM | TEMPERATURE: 97.7 F | HEART RATE: 80 BPM | DIASTOLIC BLOOD PRESSURE: 83 MMHG | SYSTOLIC BLOOD PRESSURE: 131 MMHG

## 2024-10-08 DIAGNOSIS — L97.412 DIABETIC ULCER OF RIGHT MIDFOOT ASSOCIATED WITH TYPE 2 DIABETES MELLITUS, WITH FAT LAYER EXPOSED (HCC): ICD-10-CM

## 2024-10-08 DIAGNOSIS — E11.621 DIABETIC ULCER OF RIGHT MIDFOOT ASSOCIATED WITH TYPE 2 DIABETES MELLITUS, WITH FAT LAYER EXPOSED (HCC): ICD-10-CM

## 2024-10-08 DIAGNOSIS — M14.671 CHARCOT'S JOINT OF RIGHT FOOT: Primary | ICD-10-CM

## 2024-10-08 DIAGNOSIS — M86.171 ACUTE OSTEOMYELITIS OF RIGHT FOOT: ICD-10-CM

## 2024-10-08 DIAGNOSIS — T70.0XXD OTITIC BAROTRAUMA, SUBSEQUENT ENCOUNTER: ICD-10-CM

## 2024-10-08 LAB
GLUCOSE BLD STRIP.AUTO-MCNC: 115 MG/DL (ref 65–117)
GLUCOSE BLD STRIP.AUTO-MCNC: 135 MG/DL (ref 65–117)
SERVICE CMNT-IMP: ABNORMAL
SERVICE CMNT-IMP: NORMAL

## 2024-10-08 PROCEDURE — G0277 HBOT, FULL BODY CHAMBER, 30M: HCPCS

## 2024-10-08 PROCEDURE — 82962 GLUCOSE BLOOD TEST: CPT

## 2024-10-08 ASSESSMENT — PAIN SCALES - GENERAL
PAINLEVEL_OUTOF10: 0
PAINLEVEL_OUTOF10: 0

## 2024-10-08 NOTE — PROGRESS NOTES
physician (specify)    Hyperbaric Oxygen Therapy       Physical Exam        General Appearance:  alert and oriented to person, place and time    Pre Tympanic Membrane Assessment:  Right: Grade 0  Left: Grade 0    Post Tympanic Membrane Assessment:  Left: Grade 0  Right: Grade 0    Pulmonary/Chest:  no chest wall tenderness    Cardiovascular:  normal    Plan        Patient placed in a full body Monoplace Chamber #: [id number]  Treatment Start Time: 1329     Pressure Reached Time: 1348  PARVEEN : 2.5  Number of Air Breaks:  Treatment Status: Back to oxygen     Decompression Time: 1543   Treatment End Time: 1544  Length of Treatment: 90 Minutes  Symptoms Noted During Treatment: None  Total Treatment Time (min): 135    Treatment Completion Status: Treatment completed without issue    I was present on these premises and immediately available to furnish assistance & direction throughout the HBO Treatment.     Sesar Echavarria is a 53 y.o. female  did successfully complete today's hyperbaric oxygen treatment at [facility] and HBO therapy.    In my clinical judgement, ongoing HBO therapy is  necessary at this time to assist with wound healing, preservation of limb, life, or function.    Supervision and attendance of Hyperbaric Oxygen Therapy provided. Continue HBO treatment as outlined in the treatment plan.    Hyperbaric Oxygen: Ms. Echavarria tolerated: Treatment Number: 11 without  issue.    Discharge Instructions were explained and given to Ms. Echavarria     Electronically signed by Devan Galvan MD on 10/8/2024 at 4:11 PM

## 2024-10-08 NOTE — FLOWSHEET NOTE
10/08/24 1553   Wound 08/14/24 Foot Right   Date First Assessed/Time First Assessed: 08/14/24 1521   Present on Original Admission: Yes  Wound Approximate Age at First Assessment (Weeks): 52 weeks  Primary Wound Type: Diabetic Ulcer  Location: Foot  Wound Location Orientation: Right   Wound Etiology Diabetic Del Rosario 3   Dressing Status New dressing applied   Wound Cleansed Cleansed with saline   Dressing/Treatment Betadine swabs/povidone iodine   Offloading for Diabetic Foot Ulcers Offloading boot     Discharge Condition: Stable    Pain: 0    Ambulatory Status: None    Discharge Destination: home    Transportation:car    Accompanied by: FAMILY    Discharge instructions reviewed with FAMILY and copy or written instructions have been provided. All questions/concerns have been addressed at this time.

## 2024-10-09 ENCOUNTER — ANESTHESIA EVENT (OUTPATIENT)
Facility: HOSPITAL | Age: 54
End: 2024-10-09
Payer: MEDICAID

## 2024-10-09 ENCOUNTER — HOSPITAL ENCOUNTER (OUTPATIENT)
Facility: HOSPITAL | Age: 54
Discharge: HOME | End: 2024-10-09
Attending: FAMILY MEDICINE
Payer: MEDICAID

## 2024-10-09 VITALS
DIASTOLIC BLOOD PRESSURE: 71 MMHG | HEART RATE: 82 BPM | RESPIRATION RATE: 16 BRPM | TEMPERATURE: 98 F | SYSTOLIC BLOOD PRESSURE: 121 MMHG

## 2024-10-09 DIAGNOSIS — L97.412 DIABETIC ULCER OF RIGHT MIDFOOT ASSOCIATED WITH TYPE 2 DIABETES MELLITUS, WITH FAT LAYER EXPOSED (HCC): ICD-10-CM

## 2024-10-09 DIAGNOSIS — T70.0XXD OTITIC BAROTRAUMA, SUBSEQUENT ENCOUNTER: ICD-10-CM

## 2024-10-09 DIAGNOSIS — M86.171 ACUTE OSTEOMYELITIS OF RIGHT FOOT: ICD-10-CM

## 2024-10-09 DIAGNOSIS — E11.621 DIABETIC ULCER OF RIGHT MIDFOOT ASSOCIATED WITH TYPE 2 DIABETES MELLITUS, WITH FAT LAYER EXPOSED (HCC): ICD-10-CM

## 2024-10-09 DIAGNOSIS — M14.671 CHARCOT'S JOINT OF RIGHT FOOT: Primary | ICD-10-CM

## 2024-10-09 DIAGNOSIS — E11.610 DIABETIC CHARCOT FOOT (HCC): Primary | ICD-10-CM

## 2024-10-09 DIAGNOSIS — M14.671 CHARCOT'S JOINT OF RIGHT FOOT: ICD-10-CM

## 2024-10-09 LAB
GLUCOSE BLD STRIP.AUTO-MCNC: 101 MG/DL (ref 65–117)
GLUCOSE BLD STRIP.AUTO-MCNC: 103 MG/DL (ref 65–117)
GLUCOSE BLD STRIP.AUTO-MCNC: 111 MG/DL (ref 65–117)
SERVICE CMNT-IMP: NORMAL

## 2024-10-09 PROCEDURE — 82962 GLUCOSE BLOOD TEST: CPT

## 2024-10-09 PROCEDURE — G0277 HBOT, FULL BODY CHAMBER, 30M: HCPCS

## 2024-10-09 PROCEDURE — 99213 OFFICE O/P EST LOW 20 MIN: CPT

## 2024-10-09 RX ORDER — SODIUM CHLOR/HYPOCHLOROUS ACID 0.033 %
SOLUTION, IRRIGATION IRRIGATION ONCE
OUTPATIENT
Start: 2024-10-09 | End: 2024-10-09

## 2024-10-09 RX ORDER — GINSENG 100 MG
CAPSULE ORAL ONCE
OUTPATIENT
Start: 2024-10-09 | End: 2024-10-09

## 2024-10-09 RX ORDER — MUPIROCIN 20 MG/G
OINTMENT TOPICAL ONCE
OUTPATIENT
Start: 2024-10-09 | End: 2024-10-09

## 2024-10-09 RX ORDER — LIDOCAINE 50 MG/G
OINTMENT TOPICAL ONCE
OUTPATIENT
Start: 2024-10-09 | End: 2024-10-09

## 2024-10-09 RX ORDER — LIDOCAINE 40 MG/G
CREAM TOPICAL ONCE
OUTPATIENT
Start: 2024-10-09 | End: 2024-10-09

## 2024-10-09 RX ORDER — LIDOCAINE HYDROCHLORIDE 20 MG/ML
JELLY TOPICAL ONCE
OUTPATIENT
Start: 2024-10-09 | End: 2024-10-09

## 2024-10-09 RX ORDER — TRIAMCINOLONE ACETONIDE 1 MG/G
OINTMENT TOPICAL ONCE
OUTPATIENT
Start: 2024-10-09 | End: 2024-10-09

## 2024-10-09 RX ORDER — GENTAMICIN SULFATE 1 MG/G
OINTMENT TOPICAL ONCE
OUTPATIENT
Start: 2024-10-09 | End: 2024-10-09

## 2024-10-09 RX ORDER — CLOBETASOL PROPIONATE 0.5 MG/G
OINTMENT TOPICAL ONCE
OUTPATIENT
Start: 2024-10-09 | End: 2024-10-09

## 2024-10-09 RX ORDER — BACITRACIN ZINC AND POLYMYXIN B SULFATE 500; 1000 [USP'U]/G; [USP'U]/G
OINTMENT TOPICAL ONCE
OUTPATIENT
Start: 2024-10-09 | End: 2024-10-09

## 2024-10-09 RX ORDER — NEOMYCIN/BACITRACIN/POLYMYXINB 3.5-400-5K
OINTMENT (GRAM) TOPICAL ONCE
OUTPATIENT
Start: 2024-10-09 | End: 2024-10-09

## 2024-10-09 RX ORDER — BETAMETHASONE DIPROPIONATE 0.5 MG/G
CREAM TOPICAL ONCE
OUTPATIENT
Start: 2024-10-09 | End: 2024-10-09

## 2024-10-09 RX ORDER — LIDOCAINE HYDROCHLORIDE 40 MG/ML
SOLUTION TOPICAL ONCE
OUTPATIENT
Start: 2024-10-09 | End: 2024-10-09

## 2024-10-09 ASSESSMENT — PAIN SCALES - GENERAL
PAINLEVEL_OUTOF10: 0
PAINLEVEL_OUTOF10: 0

## 2024-10-09 NOTE — WOUND CARE
Discharge Condition: Stable    Pain: 0    Ambulatory Status: None    Discharge Destination: home    Transportation:car    Accompanied by: FAMILY    Discharge instructions reviewed with FAMILY and copy or written instructions have been provided. All questions/concerns have been addressed at this time.

## 2024-10-09 NOTE — PROGRESS NOTES
HBO PROGRESS NOTE      NAME: Sesar Echavarria  MEDICAL RECORD NUMBER:  [id number]  AGE: 53 y.o.   GENDER: female  : 1970  EPISODE DATE:  10/9/2024     Subjective     HBO Treatment Number: 12 out of Total Treatments: 30    HBO Diagnosis:             Indications: Lower Extremity Diabetic Wound ___(site) (Right foot)    Safety checks performed prior to treatment.  See doc flowsheets for documentation.    Objective        Recent Labs     10/09/24  1341 10/09/24  1353   POCGLU 103 111     Pre treatment Vital Signs       See HBO flowsheet       Post treatment Vital Signs  Temp: 98 °F (36.7 °C)  Pulse: 82  Respirations: 16  BP: 121/71    Assessment        HBO Diagnosis:   Problem List Items Addressed This Visit          Endocrine    Diabetic ulcer of right midfoot associated with type 2 diabetes mellitus, with fat layer exposed (HCC)       Other    Charcot's joint of right foot - Primary    Acute osteomyelitis of right foot    Otic barotrauma       Physical Exam        General Appearance:  alert and oriented to person, place and time, well-developed and well-nourished, in no acute distress    Pre Tympanic Membrane Assessment:  Right: Grade 0  Left: Grade 0  PE tubes in place B/L  Post Tympanic Membrane Assessment:          Pulmonary/Chest:  no respiratory distress, respirations nonlabored     Cardiovascular:  normal on observation     Plan        Patient placed in a full body Monoplace Chamber #: [id number]  Treatment Start Time: 1355     Pressure Reached Time: 1415  PARVEEN : 2.5  Number of Air Breaks:  Treatment Status: Back to oxygen     Decompression Time: 1609   Treatment End Time: 1609  Length of Treatment: 90 Minutes  Symptoms Noted During Treatment: None  Total Treatment Time (min): 134    Treatment Completion Status: Treatment completed without issue    I was present on these premises and immediately available to furnish assistance & direction throughout the HBO Treatment.     Sesar Echavarria is a 53 y.o. female  did

## 2024-10-09 NOTE — PATIENT INSTRUCTIONS
Discharge Instructions/Wound Care Orders  [facility]   [address] [address]  Phone: [phone] Fax: [phone]    NAME:  Sesar Echavarria  YOB: 1970  MEDICAL RECORD NUMBER:  [id number]  DATE:  October 9, 2024    Wound Care Orders:  Right foot wound-Cleanse with baby shampoo. Allow to lather. Rinse and pat dry. Apply betadine wet to dry dressing. Secure with roll gauze and tape. Change every two days. Apply tubigrip size F.  Stay off foot as much as possible.   Activity:  [x] Elevate leg(s) above the level of the heart when sitting and avoid prolonged standing in one place.    [x] Wear off-loading shoe/boot on the affected foot when walking. Limit walking.   [x] Do not get dressing wet.    Dietary:  [] Diet as tolerated      [x] Diabetic Diet            [x] Increase Protein: examples (Meat, cheese, eggs, greek yogurt, fish, nuts)          [x] [employer] Nutrition Powder  [] Other:  [] Dial a Dietician : Call [employer] at [phone] enter code (249) when prompted. M-F 9am-5pm EST.   Follow-up:  [x] Return Appointment: With Dr. Edith Elmore in 1 Week.  [] Ordered tests:       Wound Care Center Information: Should you experience any significant changes in your wound(s) or have questions about your wound care, please contact the [facility] at MONDAY - FRIDAY 8:00 am - 4:30.  If you need help with your wound outside these hours and cannot wait until we are again available, contact your PCP or go to the hospital emergency room.     PLEASE NOTE: IF YOU ARE UNABLE TO OBTAIN WOUND SUPPLIES, CONTINUE TO USE THE SUPPLIES YOU HAVE AVAILABLE UNTIL YOU ARE ABLE TO REACH US. IT IS MOST IMPORTANT TO KEEP THE WOUND COVERED AT ALL TIMES.     Physician Signature:_______________________    Date: ___________ Time:  ____________

## 2024-10-09 NOTE — ANESTHESIA PRE PROCEDURE
Department of Anesthesiology  Preprocedure Note       Name:  Sesar Echavarria   Age:  53 y.o.  :  1970                                          MRN:  [id number]         Date:  10/9/2024      Surgeon: Surgeon(s):  Ashutosh Moreno MD    Procedure: Procedure(s):  RIGHT TENDOACHILLES LENGTHENING AND EXOSECTOMY PLANTAR EXOSTOSIS    Medications prior to admission:   Prior to Admission medications    Medication Sig Start Date End Date Taking? Authorizing Provider   hydrOXYzine HCl (ATARAX) 25 MG tablet Take 1 tablet by mouth nightly as needed for Itching 10/4/24   Arabella Moreira DO   ALPRAZolam (XANAX) 1 MG tablet Take 1 tablet by mouth daily as needed for Anxiety (take 1/2 to 1 tablet once dialy as needed) for up to 15 days. Max Daily Amount: 1 mg 10/2/24 10/17/24  Edith Elmore MD   Ashwagandha 500 MG CAPS Take 500 mg by mouth daily  Patient not taking: Reported on 10/3/2024    Michael Ramirez MD   Nutritional Supplements (NUTRITIONAL SUPPLEMENT PLUS PO) Take 1 tablet by mouth daily Indications: Self reported, CBD chewable supplement Self reported, CBD chewable supplement    Michael Ramirez MD   ferrous sulfate (IRON 325) 325 (65 Fe) MG tablet Take 1 tablet by mouth daily (with breakfast)    Michael Ramirez MD   lisinopril (ZESTRIL) 2.5 MG tablet Take 1 tablet by mouth daily 24   Arabella Moreira DO   insulin glargine (LANTUS SOLOSTAR) 100 UNIT/ML injection pen Inject 15 Units into the skin nightly 24   Yusra Washburn MD   insulin lispro, 1 Unit Dial, (HUMALOG KWIKPEN) 100 UNIT/ML SOPN Use with SSI. Max units daily: 30 units 24   Yusra Washburn MD   blood glucose test strips (TRUE METRIX BLOOD GLUCOSE TEST) strip Use to check BG 3 times daily. Dx code E11.65 24   Yusra Washburn MD   Dulaglutide (TRULICITY) 4.5 MG/0.5ML SOPN Inject 4.5 mg into the skin once a week E11.65 24   Yusra Washburn MD   Acetaminophen (TYLENOL PO) Take 625 mg by mouth

## 2024-10-09 NOTE — WOUND CARE
PT blood glucose 101mg/dL at 1326, given 8oz diabetic nutritional supplement, per protocol. Rechecked at 1341, blood glucose 103mg/dL. Notified MD at 1341. Ok to proceed with small snack of peanut butter crackers. Rechecked at 1353. Blood glucose 111. Proceeded with HBOT, per protocol.

## 2024-10-09 NOTE — FLOWSHEET NOTE
10/09/24 1312   Wound 08/14/24 Foot Right   Date First Assessed/Time First Assessed: 08/14/24 1521   Present on Original Admission: Yes  Wound Approximate Age at First Assessment (Weeks): 52 weeks  Primary Wound Type: Diabetic Ulcer  Location: Foot  Wound Location Orientation: Right   Wound Etiology Diabetic Del Rosario 3   Dressing Status Old drainage noted   Wound Cleansed Cleansed with saline   Offloading for Diabetic Foot Ulcers Offloading boot   Wound Length (cm) 0.2 cm   Wound Width (cm) 0.2 cm   Wound Depth (cm) 0.1 cm   Wound Surface Area (cm^2) 0.04 cm^2   Change in Wound Size % (l*w) 66.67   Wound Volume (cm^3) 0.004 cm^3   Wound Healing % 67   Wound Assessment Dry   Drainage Amount Small (< 25%)   Drainage Description Serosanguinous   Odor None   Melanie-wound Assessment Dry/flaky   Margins Undefined edges   Wound Thickness Description not for Pressure Injury Full thickness

## 2024-10-10 ENCOUNTER — ANESTHESIA (OUTPATIENT)
Facility: HOSPITAL | Age: 54
End: 2024-10-10
Payer: MEDICAID

## 2024-10-10 ENCOUNTER — APPOINTMENT (OUTPATIENT)
Facility: HOSPITAL | Age: 54
End: 2024-10-10
Attending: ORTHOPAEDIC SURGERY
Payer: MEDICAID

## 2024-10-10 ENCOUNTER — HOSPITAL ENCOUNTER (OUTPATIENT)
Facility: HOSPITAL | Age: 54
Discharge: HOME | End: 2024-10-10
Attending: ORTHOPAEDIC SURGERY | Admitting: ORTHOPAEDIC SURGERY
Payer: MEDICAID

## 2024-10-10 VITALS
TEMPERATURE: 97.5 F | SYSTOLIC BLOOD PRESSURE: 131 MMHG | HEART RATE: 95 BPM | OXYGEN SATURATION: 94 % | DIASTOLIC BLOOD PRESSURE: 80 MMHG | RESPIRATION RATE: 18 BRPM

## 2024-10-10 LAB
GLUCOSE BLD STRIP.AUTO-MCNC: 161 MG/DL (ref 65–100)
GLUCOSE BLD STRIP.AUTO-MCNC: 166 MG/DL (ref 65–117)
GLUCOSE BLD STRIP.AUTO-MCNC: 175 MG/DL (ref 65–100)
GLUCOSE BLD STRIP.AUTO-MCNC: 206 MG/DL (ref 65–100)
PERFORMED BY:: ABNORMAL
SERVICE CMNT-IMP: ABNORMAL

## 2024-10-10 PROCEDURE — 2500000003 HC RX 250 WO HCPCS: Performed by: ORTHOPAEDIC SURGERY

## 2024-10-10 PROCEDURE — 2580000003 HC RX 258: Performed by: NURSE ANESTHETIST, CERTIFIED REGISTERED

## 2024-10-10 PROCEDURE — 76000 FLUOROSCOPY <1 HR PHYS/QHP: CPT

## 2024-10-10 PROCEDURE — 6360000002 HC RX W HCPCS: Performed by: NURSE ANESTHETIST, CERTIFIED REGISTERED

## 2024-10-10 PROCEDURE — 3700000000 HC ANESTHESIA ATTENDED CARE: Performed by: ORTHOPAEDIC SURGERY

## 2024-10-10 PROCEDURE — 2500000003 HC RX 250 WO HCPCS: Performed by: NURSE ANESTHETIST, CERTIFIED REGISTERED

## 2024-10-10 PROCEDURE — 3600000003 HC SURGERY LEVEL 3 BASE: Performed by: ORTHOPAEDIC SURGERY

## 2024-10-10 PROCEDURE — 6360000002 HC RX W HCPCS: Performed by: ORTHOPAEDIC SURGERY

## 2024-10-10 PROCEDURE — 7100000010 HC PHASE II RECOVERY - FIRST 15 MIN: Performed by: ORTHOPAEDIC SURGERY

## 2024-10-10 PROCEDURE — 2580000003 HC RX 258: Performed by: ORTHOPAEDIC SURGERY

## 2024-10-10 PROCEDURE — 6360000002 HC RX W HCPCS: Performed by: ANESTHESIOLOGY

## 2024-10-10 PROCEDURE — 6370000000 HC RX 637 (ALT 250 FOR IP): Performed by: ANESTHESIOLOGY

## 2024-10-10 PROCEDURE — 2709999900 HC NON-CHARGEABLE SUPPLY: Performed by: ORTHOPAEDIC SURGERY

## 2024-10-10 PROCEDURE — 82962 GLUCOSE BLOOD TEST: CPT

## 2024-10-10 PROCEDURE — 7100000011 HC PHASE II RECOVERY - ADDTL 15 MIN: Performed by: ORTHOPAEDIC SURGERY

## 2024-10-10 PROCEDURE — 3600000013 HC SURGERY LEVEL 3 ADDTL 15MIN: Performed by: ORTHOPAEDIC SURGERY

## 2024-10-10 PROCEDURE — 7100000001 HC PACU RECOVERY - ADDTL 15 MIN: Performed by: ORTHOPAEDIC SURGERY

## 2024-10-10 PROCEDURE — 3700000001 HC ADD 15 MINUTES (ANESTHESIA): Performed by: ORTHOPAEDIC SURGERY

## 2024-10-10 PROCEDURE — 6370000000 HC RX 637 (ALT 250 FOR IP): Performed by: ORTHOPAEDIC SURGERY

## 2024-10-10 PROCEDURE — 7100000000 HC PACU RECOVERY - FIRST 15 MIN: Performed by: ORTHOPAEDIC SURGERY

## 2024-10-10 RX ORDER — DEXTROSE MONOHYDRATE 100 MG/ML
INJECTION, SOLUTION INTRAVENOUS CONTINUOUS PRN
Status: DISCONTINUED | OUTPATIENT
Start: 2024-10-10 | End: 2024-10-10 | Stop reason: HOSPADM

## 2024-10-10 RX ORDER — SCOLOPAMINE TRANSDERMAL SYSTEM 1 MG/1
1 PATCH, EXTENDED RELEASE TRANSDERMAL
Status: DISCONTINUED | OUTPATIENT
Start: 2024-10-10 | End: 2024-10-10 | Stop reason: HOSPADM

## 2024-10-10 RX ORDER — LIDOCAINE 4 G/G
1 PATCH TOPICAL AS NEEDED
Status: DISCONTINUED | OUTPATIENT
Start: 2024-10-10 | End: 2024-10-10 | Stop reason: HOSPADM

## 2024-10-10 RX ORDER — LIDOCAINE HYDROCHLORIDE 20 MG/ML
INJECTION, SOLUTION EPIDURAL; INFILTRATION; INTRACAUDAL; PERINEURAL
Status: DISCONTINUED | OUTPATIENT
Start: 2024-10-10 | End: 2024-10-10 | Stop reason: SDUPTHER

## 2024-10-10 RX ORDER — OXYCODONE HYDROCHLORIDE 5 MG/1
10 TABLET ORAL PRN
Status: DISCONTINUED | OUTPATIENT
Start: 2024-10-10 | End: 2024-10-10 | Stop reason: HOSPADM

## 2024-10-10 RX ORDER — ACETAMINOPHEN 325 MG/1
650 TABLET ORAL ONCE
Status: COMPLETED | OUTPATIENT
Start: 2024-10-10 | End: 2024-10-10

## 2024-10-10 RX ORDER — ROCURONIUM BROMIDE 10 MG/ML
INJECTION, SOLUTION INTRAVENOUS
Status: DISCONTINUED | OUTPATIENT
Start: 2024-10-10 | End: 2024-10-10 | Stop reason: SDUPTHER

## 2024-10-10 RX ORDER — MIDAZOLAM HYDROCHLORIDE 1 MG/ML
INJECTION INTRAMUSCULAR; INTRAVENOUS
Status: DISCONTINUED | OUTPATIENT
Start: 2024-10-10 | End: 2024-10-10 | Stop reason: SDUPTHER

## 2024-10-10 RX ORDER — EPHEDRINE SULFATE 50 MG/ML
INJECTION INTRAVENOUS
Status: DISCONTINUED | OUTPATIENT
Start: 2024-10-10 | End: 2024-10-10 | Stop reason: SDUPTHER

## 2024-10-10 RX ORDER — FENTANYL CITRATE 0.05 MG/ML
50 INJECTION, SOLUTION INTRAMUSCULAR; INTRAVENOUS EVERY 5 MIN PRN
Status: DISCONTINUED | OUTPATIENT
Start: 2024-10-10 | End: 2024-10-10 | Stop reason: HOSPADM

## 2024-10-10 RX ORDER — LORAZEPAM 2 MG/ML
0.5 INJECTION INTRAMUSCULAR
Status: DISCONTINUED | OUTPATIENT
Start: 2024-10-10 | End: 2024-10-10 | Stop reason: HOSPADM

## 2024-10-10 RX ORDER — OXYCODONE HYDROCHLORIDE 5 MG/1
5 TABLET ORAL PRN
Status: DISCONTINUED | OUTPATIENT
Start: 2024-10-10 | End: 2024-10-10 | Stop reason: HOSPADM

## 2024-10-10 RX ORDER — SODIUM CHLORIDE 0.9 % (FLUSH) 0.9 %
5-40 SYRINGE (ML) INJECTION PRN
Status: DISCONTINUED | OUTPATIENT
Start: 2024-10-10 | End: 2024-10-10 | Stop reason: HOSPADM

## 2024-10-10 RX ORDER — SODIUM CHLORIDE 0.9 % (FLUSH) 0.9 %
5-40 SYRINGE (ML) INJECTION EVERY 12 HOURS SCHEDULED
Status: DISCONTINUED | OUTPATIENT
Start: 2024-10-10 | End: 2024-10-10 | Stop reason: HOSPADM

## 2024-10-10 RX ORDER — SODIUM CHLORIDE, SODIUM LACTATE, POTASSIUM CHLORIDE, CALCIUM CHLORIDE 600; 310; 30; 20 MG/100ML; MG/100ML; MG/100ML; MG/100ML
INJECTION, SOLUTION INTRAVENOUS
Status: DISCONTINUED | OUTPATIENT
Start: 2024-10-10 | End: 2024-10-10 | Stop reason: SDUPTHER

## 2024-10-10 RX ORDER — IPRATROPIUM BROMIDE AND ALBUTEROL SULFATE 2.5; .5 MG/3ML; MG/3ML
1 SOLUTION RESPIRATORY (INHALATION)
Status: DISCONTINUED | OUTPATIENT
Start: 2024-10-10 | End: 2024-10-10 | Stop reason: HOSPADM

## 2024-10-10 RX ORDER — GLYCOPYRROLATE 0.2 MG/ML
INJECTION INTRAMUSCULAR; INTRAVENOUS
Status: DISCONTINUED | OUTPATIENT
Start: 2024-10-10 | End: 2024-10-10 | Stop reason: SDUPTHER

## 2024-10-10 RX ORDER — FENTANYL CITRATE 50 UG/ML
INJECTION, SOLUTION INTRAMUSCULAR; INTRAVENOUS
Status: DISCONTINUED | OUTPATIENT
Start: 2024-10-10 | End: 2024-10-10 | Stop reason: SDUPTHER

## 2024-10-10 RX ORDER — BUPIVACAINE HYDROCHLORIDE 5 MG/ML
INJECTION, SOLUTION PERINEURAL PRN
Status: DISCONTINUED | OUTPATIENT
Start: 2024-10-10 | End: 2024-10-10 | Stop reason: ALTCHOICE

## 2024-10-10 RX ORDER — LABETALOL HYDROCHLORIDE 5 MG/ML
10 INJECTION, SOLUTION INTRAVENOUS
Status: DISCONTINUED | OUTPATIENT
Start: 2024-10-10 | End: 2024-10-10 | Stop reason: HOSPADM

## 2024-10-10 RX ORDER — METOCLOPRAMIDE HYDROCHLORIDE 5 MG/ML
10 INJECTION INTRAMUSCULAR; INTRAVENOUS
Status: DISCONTINUED | OUTPATIENT
Start: 2024-10-10 | End: 2024-10-10 | Stop reason: HOSPADM

## 2024-10-10 RX ORDER — PROPOFOL 10 MG/ML
INJECTION, EMULSION INTRAVENOUS
Status: DISCONTINUED | OUTPATIENT
Start: 2024-10-10 | End: 2024-10-10 | Stop reason: SDUPTHER

## 2024-10-10 RX ORDER — SODIUM CHLORIDE, SODIUM LACTATE, POTASSIUM CHLORIDE, CALCIUM CHLORIDE 600; 310; 30; 20 MG/100ML; MG/100ML; MG/100ML; MG/100ML
INJECTION, SOLUTION INTRAVENOUS ONCE
Status: DISCONTINUED | OUTPATIENT
Start: 2024-10-10 | End: 2024-10-10 | Stop reason: HOSPADM

## 2024-10-10 RX ORDER — DIPHENHYDRAMINE HYDROCHLORIDE 50 MG/ML
12.5 INJECTION INTRAMUSCULAR; INTRAVENOUS
Status: DISCONTINUED | OUTPATIENT
Start: 2024-10-10 | End: 2024-10-10 | Stop reason: HOSPADM

## 2024-10-10 RX ORDER — ONDANSETRON 2 MG/ML
4 INJECTION INTRAMUSCULAR; INTRAVENOUS
Status: COMPLETED | OUTPATIENT
Start: 2024-10-10 | End: 2024-10-10

## 2024-10-10 RX ORDER — HYDROMORPHONE HYDROCHLORIDE 1 MG/ML
0.5 INJECTION, SOLUTION INTRAMUSCULAR; INTRAVENOUS; SUBCUTANEOUS EVERY 5 MIN PRN
Status: DISCONTINUED | OUTPATIENT
Start: 2024-10-10 | End: 2024-10-10 | Stop reason: HOSPADM

## 2024-10-10 RX ORDER — HYDRALAZINE HYDROCHLORIDE 20 MG/ML
10 INJECTION INTRAMUSCULAR; INTRAVENOUS
Status: DISCONTINUED | OUTPATIENT
Start: 2024-10-10 | End: 2024-10-10 | Stop reason: HOSPADM

## 2024-10-10 RX ORDER — SODIUM CHLORIDE 9 MG/ML
INJECTION, SOLUTION INTRAVENOUS PRN
Status: DISCONTINUED | OUTPATIENT
Start: 2024-10-10 | End: 2024-10-10 | Stop reason: HOSPADM

## 2024-10-10 RX ORDER — MEPERIDINE HYDROCHLORIDE 25 MG/ML
12.5 INJECTION INTRAMUSCULAR; INTRAVENOUS; SUBCUTANEOUS EVERY 5 MIN PRN
Status: DISCONTINUED | OUTPATIENT
Start: 2024-10-10 | End: 2024-10-10 | Stop reason: HOSPADM

## 2024-10-10 RX ORDER — GLUCAGON 1 MG/ML
1 KIT INJECTION PRN
Status: DISCONTINUED | OUTPATIENT
Start: 2024-10-10 | End: 2024-10-10 | Stop reason: HOSPADM

## 2024-10-10 RX ORDER — NALOXONE HYDROCHLORIDE 0.4 MG/ML
INJECTION, SOLUTION INTRAMUSCULAR; INTRAVENOUS; SUBCUTANEOUS PRN
Status: DISCONTINUED | OUTPATIENT
Start: 2024-10-10 | End: 2024-10-10 | Stop reason: HOSPADM

## 2024-10-10 RX ADMIN — TRANEXAMIC ACID 1000 MG: 100 INJECTION, SOLUTION INTRAVENOUS at 14:50

## 2024-10-10 RX ADMIN — MIDAZOLAM 2 MG: 1 INJECTION INTRAMUSCULAR; INTRAVENOUS at 12:59

## 2024-10-10 RX ADMIN — PROPOFOL 200 MG: 10 INJECTION, EMULSION INTRAVENOUS at 13:10

## 2024-10-10 RX ADMIN — GLYCOPYRROLATE 0.2 MG: 0.2 INJECTION INTRAMUSCULAR; INTRAVENOUS at 12:59

## 2024-10-10 RX ADMIN — ROCURONIUM BROMIDE 50 MG: 10 INJECTION, SOLUTION INTRAVENOUS at 13:10

## 2024-10-10 RX ADMIN — CEFAZOLIN 2000 MG: 1 INJECTION, POWDER, FOR SOLUTION INTRAMUSCULAR; INTRAVENOUS at 13:09

## 2024-10-10 RX ADMIN — SUGAMMADEX 200 MG: 100 INJECTION, SOLUTION INTRAVENOUS at 15:10

## 2024-10-10 RX ADMIN — FENTANYL CITRATE 50 MCG: 50 INJECTION INTRAMUSCULAR; INTRAVENOUS at 13:10

## 2024-10-10 RX ADMIN — EPHEDRINE SULFATE 10 MG: 50 INJECTION INTRAVENOUS at 13:38

## 2024-10-10 RX ADMIN — FENTANYL CITRATE 50 MCG: 50 INJECTION INTRAMUSCULAR; INTRAVENOUS at 15:06

## 2024-10-10 RX ADMIN — SODIUM CHLORIDE, POTASSIUM CHLORIDE, SODIUM LACTATE AND CALCIUM CHLORIDE: 600; 310; 30; 20 INJECTION, SOLUTION INTRAVENOUS at 13:04

## 2024-10-10 RX ADMIN — ACETAMINOPHEN 650 MG: 325 TABLET ORAL at 11:53

## 2024-10-10 RX ADMIN — TRANEXAMIC ACID 1000 MG: 100 INJECTION, SOLUTION INTRAVENOUS at 13:18

## 2024-10-10 RX ADMIN — EPHEDRINE SULFATE 10 MG: 50 INJECTION INTRAVENOUS at 13:59

## 2024-10-10 RX ADMIN — FENTANYL CITRATE 50 MCG: 50 INJECTION INTRAMUSCULAR; INTRAVENOUS at 13:52

## 2024-10-10 RX ADMIN — FENTANYL CITRATE 50 MCG: 50 INJECTION INTRAMUSCULAR; INTRAVENOUS at 13:29

## 2024-10-10 RX ADMIN — LIDOCAINE HYDROCHLORIDE 100 MG: 20 SOLUTION INTRAVENOUS at 13:10

## 2024-10-10 RX ADMIN — ONDANSETRON 4 MG: 2 INJECTION INTRAMUSCULAR; INTRAVENOUS at 16:24

## 2024-10-10 ASSESSMENT — PAIN DESCRIPTION - LOCATION
LOCATION: FOOT;ANKLE
LOCATION: ANKLE;FOOT

## 2024-10-10 ASSESSMENT — PAIN DESCRIPTION - PAIN TYPE: TYPE: SURGICAL PAIN

## 2024-10-10 ASSESSMENT — PAIN SCALES - GENERAL
PAINLEVEL_OUTOF10: 0
PAINLEVEL_OUTOF10: 2

## 2024-10-10 ASSESSMENT — PAIN DESCRIPTION - ORIENTATION: ORIENTATION: RIGHT

## 2024-10-10 ASSESSMENT — PAIN - FUNCTIONAL ASSESSMENT
PAIN_FUNCTIONAL_ASSESSMENT: 0-10
PAIN_FUNCTIONAL_ASSESSMENT: 0-10
PAIN_FUNCTIONAL_ASSESSMENT: NONE - DENIES PAIN

## 2024-10-10 NOTE — ANESTHESIA POSTPROCEDURE EVALUATION
Department of Anesthesiology  Postprocedure Note    Patient: Sesar Echavarria  MRN: [id number]  YOB: 1970  Date of evaluation: 10/10/2024    Procedure Summary       Date: 10/10/24 Room / Location: [facility] MAIN OR 06 / [facility] MAIN OR    Anesthesia Start: 1259 Anesthesia Stop: 1522    Procedure: RIGHT TENDOACHILLES LENGTHENING AND EXOSECTOMY PLANTAR EXOSTOSIS (Right: Leg Lower) Diagnosis:       Diabetic Charcot's foot (HCC)      (Diabetic Charcot's foot (HCC) [E11.610])    Surgeons: Ashutosh Moreno MD Responsible Provider: Octavio Anthony MD    Anesthesia Type: General ASA Status: 3            Anesthesia Type: General    Abraham Phase I: Abraham Score: 9    Abraham Phase II: Abraham Score: 10    Anesthesia Post Evaluation    Patient location during evaluation: PACU  Patient participation: complete - patient participated  Level of consciousness: sleepy but conscious  Pain score: 0  Airway patency: patent  Nausea & Vomiting: no nausea and no vomiting  Cardiovascular status: hemodynamically stable  Respiratory status: acceptable  Hydration status: stable  Multimodal analgesia pain management approach        No notable events documented.

## 2024-10-10 NOTE — FLOWSHEET NOTE
10/09/24 1421   Wound 08/14/24 Foot Right   Date First Assessed/Time First Assessed: 08/14/24 1521   Present on Original Admission: Yes  Wound Approximate Age at First Assessment (Weeks): 52 weeks  Primary Wound Type: Diabetic Ulcer  Location: Foot  Wound Location Orientation: Right   Wound Etiology Diabetic Del Rosario 3   Dressing Status New dressing applied   Dressing/Treatment Betadine swabs/povidone iodine   Offloading for Diabetic Foot Ulcers Offloading boot     Discharge Condition: Stable    Pain: 0    Ambulatory Status: None    Discharge Destination: home    Transportation:car    Accompanied by: SELF    Discharge instructions reviewed with SELF and copy or written instructions have been provided. All questions/concerns have been addressed at this time.

## 2024-10-10 NOTE — OP NOTE
Operative Note      Patient: Sesar Echavarria  YOB: 1970  MRN: [id number]    Date of Procedure: 10/10/2024    Pre-Op Diagnosis Codes:      * Diabetic Charcot's foot (HCC) [E11.610]    Post-Op Diagnosis: Same       Procedure(s):  RIGHT TENDOACHILLES LENGTHENING  XOSECTOMY PLANTAR EXOSTOSIS to include partial resection of the cuboid    Surgeon(s):  Ashutosh Moreno MD    Assistant:   Physician Assistant: Ana M Noe PA-C  Use of the case for assistance with closure, application of cast, retraction  Anesthesia: General    Estimated Blood Loss (mL): Minimal    Complications: None    Specimens:   * No specimens in log *    Implants:  * No implants in log *      Drains: * No LDAs found *    Findings:  Infection Present At Time Of Surgery (PATOS) (choose all levels that have infection present):  - Superficial Infection (skin/subcutaneous) present as evidenced by phlegmon  Other Findings: Plantar subluxation of the cuboid    Detailed Description of Procedure:   Patient is prepped and draped in usual manner, timeout was called, antibiotics were given, triple hemisection technique was used for the Achilles tendon lengthening, 2 medial incision was made partial tailoring of the tendon was done, then a lateral incision was made in the mid to tendon this corrected the hindfoot equinus, incision was made on the lateral aspect of the exostosis on the plantar midfoot, skin subtenons tissue cut full-thickness flaps were elevated and using a bur the plantar exostosis was burred down, wound was then irrigated and loosely closed in a well-padded short leg cast applied patient Toller surgery well and left the operating room in a stable condition manage for nonweightbearing for 4 weeks  Electronically signed by Ashutosh Moreno MD on 10/10/2024 at 3:35 PM

## 2024-10-11 ENCOUNTER — TELEPHONE (OUTPATIENT)
Age: 54
End: 2024-10-11

## 2024-10-11 DIAGNOSIS — E11.65 TYPE 2 DIABETES MELLITUS WITH HYPERGLYCEMIA, WITH LONG-TERM CURRENT USE OF INSULIN (HCC): ICD-10-CM

## 2024-10-11 DIAGNOSIS — Z79.4 TYPE 2 DIABETES MELLITUS WITH HYPERGLYCEMIA, WITH LONG-TERM CURRENT USE OF INSULIN (HCC): ICD-10-CM

## 2024-10-11 RX ORDER — LANCETS 30 GAUGE
EACH MISCELLANEOUS
Qty: 300 EACH | Refills: 3 | Status: SHIPPED | OUTPATIENT
Start: 2024-10-11

## 2024-10-11 RX ORDER — PEN NEEDLE, DIABETIC 30 GX3/16"
NEEDLE, DISPOSABLE MISCELLANEOUS
Qty: 400 EACH | Refills: 3 | Status: SHIPPED | OUTPATIENT
Start: 2024-10-11

## 2024-10-11 NOTE — TELEPHONE ENCOUNTER
Hi,     Pt is requesting her \"needle pens and fingerpricks\" to please be sent to [address].       Pt also wants to update Dr. Washburn of her health status. Pt says, she cancelled her previous ov appt because of her \"HBO, and tendonitis surgery on Right Leg\" which was performed by, Dr. Moreno on 10/10/24. Pt is on bedrest and must stay off her right leg for 15-18 weeks.     Thank you.

## 2024-10-15 ENCOUNTER — TELEPHONE (OUTPATIENT)
Facility: HOSPITAL | Age: 54
End: 2024-10-15

## 2024-10-15 NOTE — TELEPHONE ENCOUNTER
TC from the patient name/ verified. Patient reports that she is admitted to [facility].  Unclear on when she may discharge from the hospital. Advised to call back to the clinic to arrange for a visit with Dr. Elmore before resuming HBO therapy. Patient agreeable to plan.

## 2024-10-23 ENCOUNTER — HOSPITAL ENCOUNTER (OUTPATIENT)
Facility: HOSPITAL | Age: 54
Discharge: HOME | End: 2024-10-23
Attending: FAMILY MEDICINE
Payer: MEDICAID

## 2024-10-23 VITALS
RESPIRATION RATE: 18 BRPM | SYSTOLIC BLOOD PRESSURE: 170 MMHG | HEART RATE: 93 BPM | TEMPERATURE: 97.8 F | DIASTOLIC BLOOD PRESSURE: 85 MMHG

## 2024-10-23 DIAGNOSIS — E11.610 DIABETIC CHARCOT FOOT (HCC): ICD-10-CM

## 2024-10-23 DIAGNOSIS — M86.171 ACUTE OSTEOMYELITIS OF RIGHT FOOT: Primary | ICD-10-CM

## 2024-10-23 DIAGNOSIS — L97.412 DIABETIC ULCER OF RIGHT MIDFOOT ASSOCIATED WITH TYPE 2 DIABETES MELLITUS, WITH FAT LAYER EXPOSED (HCC): ICD-10-CM

## 2024-10-23 DIAGNOSIS — E11.621 DIABETIC ULCER OF RIGHT MIDFOOT ASSOCIATED WITH TYPE 2 DIABETES MELLITUS, WITH FAT LAYER EXPOSED (HCC): ICD-10-CM

## 2024-10-23 PROCEDURE — 99213 OFFICE O/P EST LOW 20 MIN: CPT

## 2024-10-23 RX ORDER — MUPIROCIN 20 MG/G
OINTMENT TOPICAL ONCE
Status: CANCELLED | OUTPATIENT
Start: 2024-10-23 | End: 2024-10-23

## 2024-10-23 RX ORDER — NEOMYCIN/BACITRACIN/POLYMYXINB 3.5-400-5K
OINTMENT (GRAM) TOPICAL ONCE
Status: CANCELLED | OUTPATIENT
Start: 2024-10-23 | End: 2024-10-23

## 2024-10-23 RX ORDER — CLOBETASOL PROPIONATE 0.5 MG/G
OINTMENT TOPICAL ONCE
Status: CANCELLED | OUTPATIENT
Start: 2024-10-23 | End: 2024-10-23

## 2024-10-23 RX ORDER — LIDOCAINE HYDROCHLORIDE 20 MG/ML
JELLY TOPICAL ONCE
Status: CANCELLED | OUTPATIENT
Start: 2024-10-23 | End: 2024-10-23

## 2024-10-23 RX ORDER — LIDOCAINE 50 MG/G
OINTMENT TOPICAL ONCE
Status: CANCELLED | OUTPATIENT
Start: 2024-10-23 | End: 2024-10-23

## 2024-10-23 RX ORDER — TRIAMCINOLONE ACETONIDE 1 MG/G
OINTMENT TOPICAL ONCE
Status: CANCELLED | OUTPATIENT
Start: 2024-10-23 | End: 2024-10-23

## 2024-10-23 RX ORDER — LIDOCAINE HYDROCHLORIDE 40 MG/ML
SOLUTION TOPICAL ONCE
Status: CANCELLED | OUTPATIENT
Start: 2024-10-23 | End: 2024-10-23

## 2024-10-23 RX ORDER — BETAMETHASONE DIPROPIONATE 0.5 MG/G
CREAM TOPICAL ONCE
Status: CANCELLED | OUTPATIENT
Start: 2024-10-23 | End: 2024-10-23

## 2024-10-23 RX ORDER — BACITRACIN ZINC AND POLYMYXIN B SULFATE 500; 1000 [USP'U]/G; [USP'U]/G
OINTMENT TOPICAL ONCE
Status: CANCELLED | OUTPATIENT
Start: 2024-10-23 | End: 2024-10-23

## 2024-10-23 RX ORDER — SODIUM CHLOR/HYPOCHLOROUS ACID 0.033 %
SOLUTION, IRRIGATION IRRIGATION ONCE
Status: CANCELLED | OUTPATIENT
Start: 2024-10-23 | End: 2024-10-23

## 2024-10-23 RX ORDER — GENTAMICIN SULFATE 1 MG/G
OINTMENT TOPICAL ONCE
Status: CANCELLED | OUTPATIENT
Start: 2024-10-23 | End: 2024-10-23

## 2024-10-23 RX ORDER — LIDOCAINE 40 MG/G
CREAM TOPICAL ONCE
Status: CANCELLED | OUTPATIENT
Start: 2024-10-23 | End: 2024-10-23

## 2024-10-23 RX ORDER — GINSENG 100 MG
CAPSULE ORAL ONCE
Status: CANCELLED | OUTPATIENT
Start: 2024-10-23 | End: 2024-10-23

## 2024-10-23 NOTE — PROGRESS NOTES
Wound Center  Progress Note / Procedure Note      Chief Complaint:  Sesar Echavarria is a 54 y.o. female  with Right foot wound of >1 year duration.        Assessment/Plan     54 y.o. female with     -Right foot chronic ulcer.  Diabetic foot ulcer, Del Rosario grade 3 osteomyelitis  Full thickness  Wound itself appears to be healed. However given recent CT scan results still showing osteomyelitis, to continue HBO for now, would recommend 30 more treatments starting Monday, Will continue to assess every week    Given has had osteomyelitis twice and still present and has had two courses of iv abx and has been recommended amputation of this foot in the past, strongly advise to continue HBO s to prevent limb loss and early terminal demise  Continue betadine wet to dry    -S/p Achilles tendon lengthening and exostosis-  Sutures intact and looking good both areas  Surgical incision healing well    -DM2  8.3 (8/19/2024)  Management discussed has been managing with diet so insulin needs are decreasing    -Charcot Diabetic foot   has been on disability for the last year- prefers to not have to wear cam walker rest of her life  S/p Achilles tendon lengthening and exostosis  Did need to go the hospital weekend after procedure, had fever and foto pain  Found to have hematoma there, drained by Dr Moreno, had several days of iv abx then switched to PO - finishing up doxycycline    -Vascular  Good pedal pulses here  PVR normal    -DFU Del Rosario Grade 3, with osteomyelitis  Done with IV zosyn  I believe HBOT is indicated as an important adjunctive therapy in this case because of patient's condition and to prevent amputation  Approved for HBO  Likely needs another course of iv abx, to refer to ID    HBO   -barotrauma  PE tubes in place now    -HBO claustrophobia  On xanax    Following discussed with patient /daughter  Needs :  Serial debridement- prn    Good local wound care  Dressing:   betadine wet to dry  Frequency : three times a week

## 2024-10-23 NOTE — FLOWSHEET NOTE
10/23/24 1418   Right Leg Edema Point of Measurement   Compression Therapy Tubular elastic support bandage   Stockings Compression Pressure Low   Wound 08/14/24 Foot Right   Date First Assessed/Time First Assessed: 08/14/24 1521   Present on Original Admission: Yes  Wound Approximate Age at First Assessment (Weeks): 52 weeks  Primary Wound Type: Diabetic Ulcer  Location: Foot  Wound Location Orientation: Right   Dressing Status New dressing applied   Dressing/Treatment   (betadine wet to dry ABD, roll gauze, tubi  size F)   Offloading for Diabetic Foot Ulcers Offloading ordered

## 2024-10-23 NOTE — PATIENT INSTRUCTIONS
Discharge Instructions/Wound Care Orders  [facility]   [address] [address]  Phone: [phone] Fax: [phone]    NAME:  Sesar Echavarria  YOB: 1970  MEDICAL RECORD NUMBER:  [id number]  DATE:  October 23, 2024    Wound Care Orders:  Right foot wound-Cleanse with baby shampoo. Allow to lather. Rinse and pat dry. Apply betadine wet to dry dressing. Secure with roll gauze and tape. Change every two days. Apply tubigrip size F.  Stay off foot as much as possible.   Activity:  [x] Elevate leg(s) above the level of the heart when sitting and avoid prolonged standing in one place.    [x] Wear off-loading shoe/boot on the affected foot when walking. Limit walking.   [x] Do not get dressing wet.    Dietary:  [] Diet as tolerated      [x] Diabetic Diet            [x] Increase Protein: examples (Meat, cheese, eggs, greek yogurt, fish, nuts)          [x] [employer] Nutrition Powder  [] Other:  [] Dial a Dietician : Call [employer] at [phone] enter code (249) when prompted. M-F 9am-5pm EST.   Follow-up:  [x] Return Appointment: With Dr. Edith Elmore in 1 Week.  [] Ordered tests:       Wound Care Center Information: Should you experience any significant changes in your wound(s) or have questions about your wound care, please contact the [facility] at MONDAY - FRIDAY 8:00 am - 4:30.  If you need help with your wound outside these hours and cannot wait until we are again available, contact your PCP or go to the hospital emergency room.     PLEASE NOTE: IF YOU ARE UNABLE TO OBTAIN WOUND SUPPLIES, CONTINUE TO USE THE SUPPLIES YOU HAVE AVAILABLE UNTIL YOU ARE ABLE TO REACH US. IT IS MOST IMPORTANT TO KEEP THE WOUND COVERED AT ALL TIMES.     Physician Signature:_______________________    Date: ___________ Time:  ____________

## 2024-10-23 NOTE — FLOWSHEET NOTE
10/23/24 1324   Anesthetic   Anesthetic 4% Lidocaine Cream   Peripheral Vascular   Edema None   RLE Neurovascular Assessment   Capillary Refill Less than/Equal to 3 seconds   Color Appropriate for Ethnicity   Temperature Warm   RLE Sensation  Full sensation   R Pedal Pulse Doppler   Wound 08/14/24 Foot Right   Date First Assessed/Time First Assessed: 08/14/24 1521   Present on Original Admission: Yes  Wound Approximate Age at First Assessment (Weeks): 52 weeks  Primary Wound Type: Diabetic Ulcer  Location: Foot  Wound Location Orientation: Right   Wound Image    Wound Etiology Diabetic Del Rosario 3   Dressing Status Dry;Intact   Wound Cleansed Cleansed with saline   Offloading for Diabetic Foot Ulcers Offloading ordered   Wound Length (cm) 0.5 cm   Wound Width (cm) 0.3 cm   Wound Depth (cm) 0.1 cm   Wound Surface Area (cm^2) 0.15 cm^2   Change in Wound Size % (l*w) -25   Wound Volume (cm^3) 0.015 cm^3   Wound Healing % -25   Wound Assessment Pink/red;Dry   Drainage Amount Scant (moist but unmeasurable)   Drainage Description Serous   Odor None   Melanie-wound Assessment Intact   Margins Flat/open edges   Wound Thickness Description not for Pressure Injury Full thickness   Pain Assessment   Pain Assessment None - Denies Pain     BP (!) 170/85   Pulse 93   Temp 97.8 °F (36.6 °C) (Temporal)   Resp 18

## 2024-10-24 ENCOUNTER — HOSPITAL ENCOUNTER (OUTPATIENT)
Facility: HOSPITAL | Age: 54
Discharge: HOME | End: 2024-10-24
Attending: ORTHOPAEDIC SURGERY
Payer: MEDICAID

## 2024-10-24 VITALS
SYSTOLIC BLOOD PRESSURE: 190 MMHG | DIASTOLIC BLOOD PRESSURE: 93 MMHG | RESPIRATION RATE: 18 BRPM | TEMPERATURE: 98.8 F | HEART RATE: 93 BPM

## 2024-10-24 PROCEDURE — 29445 APPL RIGID TOT CNTC LEG CAST: CPT

## 2024-10-24 PROCEDURE — 99214 OFFICE O/P EST MOD 30 MIN: CPT

## 2024-10-24 NOTE — WOUND CARE
Total Contact Cast    NAME:  Sesar Echavarria  YOB: 1970  MEDICAL RECORD NUMBER:  [id number]  DATE:  10/24/2024    Goal:  Patient will maintain integrity of cast, avoid mobility hazards, and report complications that may occur (foul odor, pain, numbness, cracked cast).    Applied ordered dressing over wound(s)   Applied cast padding  Applied foam padding  Applied per Dr. Moreno  Total Contact Cast was applied in the Wound Care Center to right lower leg  Applied cast material fiberglass  Applied cast material plaster   Allow cast to dry   Instructed patient to report to health care provider, including wound care center, any back pain, hip pain, or leg pain, numbness of toes, or any odor coming from the cast.   Instructed patient not to stick any foreign objects down into cast.  Instructed patient to utilize assistive devices(crutches, cane or walker) as ordered.  Instructed patient to continue offloading as directed.     Applied cast per  Guidelines    Electronically signed by Ana Paula Guadalupe RN on 10/24/2024 at 10:28 AM

## 2024-10-24 NOTE — DISCHARGE INSTRUCTIONS
Hydrofera Blue (cut to size and moistened with normal saline)    [] Hydrofera Blue Ready (cut to size)                      [] Normal Saline wet to dry    [] Betadine wet to dry                          [] Hydrogel                 [] Mepitel                       [] Bactroban/Mupirocin              [] Iodoform Packing Strip      [] Plain Packing Strip              [] Skin Sub:   [] Other:       [x] Cover and Secure with:                   [x] Gauze         [] Kendall           [] Kerlix              [] Zetuvit Plus Silicone Border or Foam Border        [] Super Absorbant    [] ABD                              [] Ace Wrap   [x] Other: FOAM               Limit contact of tape with skin.     [x] Change dressing:   [] Daily           [] Every Other Day    [] Twice per week   [] Three times per week              [] Once a week          [x] Do Not Change Dressing     [] Other:                Negative Pressure:           Wound Location:   [] Pressure @   mm/Hg                     []Continuous  []Intermittent              [] Black Foam            [] White Foam            [] Bridge  [] Change dressing 3 times per week           [] Skin Prep to jhonathan-wound  [] Other:      Pressure Relief:  [] When sitting, shift position or do seat lifts every 15 minutes.  [] Wheelchair cushion           [] Specialty Bed/Mattress  [] Turn every 2 hours when in bed.  Avoid direct pressure on wound site.  Limit side lying to 30 degree tilt.  Limit HOB elevation to 30 degrees.  [] Other                Edema Control:  Apply:  [] Compression Stocking:   mmHg    []Right Leg     []Left Leg              [] Tubigrip      []Right Leg Double Layer      []Left Leg Double Layer                                      []Right Leg Single Layer       []Left Leg Single Layer                            [] Elevate leg(s) above the level of the heart when sitting.                 [] Avoid prolonged standing in one place.         Compression:  Apply:  [] Compression

## 2024-10-24 NOTE — WOUND CARE
Wound Clinic Physician Orders and Discharge Instructions  [facility]  [address], [address]  [place] [address]  Telephone: [phone]     FAX [phone]    NAME:  Sesar Echavarria  YOB: 1970  MEDICAL RECORD NUMBER:  [id number]  DATE:  10/24/2024      Return Appointment:  [] Dressing Supply Provider:   [] Home Healthcare:  [x] Return Appointment: 1 Week(s)  [] Nurse Visit:      [] Discharge from [facility]: [] Healed        [] Refer to Provider:         [] Consult    Follow-up Information:  [] Ordered Tests:   [] Referrals:   [x] Rx: DM SHOES WITH TCO- GIVEN TO PATIENT  [] Would Culture:  [] Other:       Wound Cleansing:   Do not scrub or use excessive force.  Cleanse wound prior to applying a clean dressing with:  [] Normal Saline   [] Cleanse wound with Mild Soap & Water     [] Wound Cleanser   [x] Keep Wound Dry in Shower  [x] Wear a cast cover to shower  [] Other:       Topical Treatments:  Do not apply lotions, creams, or ointments to wound bed unless directed.   [] Apply moisturizing lotion to skin surrounding the wound prior to dressing change.  [] Apply antifungal ointment to skin surrounding the wound prior to dressing change.  [] Apply thin film of moisture barrier ointment to skin immediately around wound.  [] Apply Betadine to skin immediately around wound   [] Apply Skin Prep to skin immediately around wound  [] Other:      Dressings:           Wound Location RIGHT FOOT    [x] Apply Primary Dressing:       [] MediHoney Gel [] MediHoney Alginate  [x] Calcium Alginate with Silver   [] Calcium Alginate without silver   [] Collagen with silver [] Collagen without Silver    [] Santyl with moist saline gauze     [] Hydrofera Blue (cut to size and moistened with normal saline)  [] Hydrofera Blue Ready (cut to size)      [] Normal Saline wet to dry  [] Betadine wet to dry    [] Hydrogel  [] Mepitel     [] Bactroban/Mupirocin   [] Iodoform Packing Strip []

## 2024-10-25 ENCOUNTER — TELEPHONE (OUTPATIENT)
Facility: HOSPITAL | Age: 54
End: 2024-10-25

## 2024-10-25 NOTE — WOUND CARE
[facility]   Wound Care and Hyperbaric Oxygen Therapy Center   Medical Staff Progress Note     Sesar Echavarria  MEDICAL RECORD NUMBER:  [id number]  AGE: 54 y.o.   GENDER: female  : 1970  EPISODE DATE:  10/24/2024    Chief complaint and reason for visit:     Chief Complaint   Patient presents with    Wound Check     R foot             HISTORY of PRESENT ILLNESS HPI     Sesar Echavarria is a 54 y.o. female who presents today for wound/ulcer evaluation.  Patient was admitted for possible fevers postoperatively.  The elevated blood sugar present at that time so patient got admitted with a presumptive diagnosis of infection but no definite infection was found, cultures from the urine were negative        Objective:    BP (!) 190/93   Pulse 93   Temp 98.8 °F (37.1 °C) (Temporal)   Resp 18   Wt Readings from Last 3 Encounters:   10/01/24 100.8 kg (222 lb 3.2 oz)   24 90.7 kg (200 lb)   24 90.7 kg (200 lb)       Post Debridement Measurements:  Wound/Ulcer Descriptions are Pre Debridement except measurements:  Wound 24 Foot Right;Plantar #1 (Active)   Wound Image   10/24/24 1007   Wound Etiology Surgical 10/24/24 1007   Dressing Status Clean;Dry;Intact 10/24/24 1007   Wound Cleansed Cleansed with saline 10/24/24 1007   Dressing/Treatment Cast 10/10/24 1549   Offloading for Diabetic Foot Ulcers Total contact cast 10/24/24 1007   Wound Length (cm) 0 cm 10/24/24 1007   Wound Width (cm) 0 cm 10/24/24 1007   Wound Depth (cm) 0 cm 10/24/24 1007   Wound Surface Area (cm^2) 0 cm^2 10/24/24 1007   Change in Wound Size % (l*w) 100 10/24/24 1007   Wound Volume (cm^3) 0 cm^3 10/24/24 1007   Wound Healing % 100 10/24/24 1007   Wound Assessment Dry;Other (Comment) 10/24/24 1007   Drainage Amount None (dry) 10/24/24 1007   Drainage Description Serous 10/23/24 1324   Odor None 10/24/24 1007   Melanie-wound Assessment Intact 10/24/24 1007   Margins Attached edges 10/24/24 1007   Wound Thickness

## 2024-10-25 NOTE — TELEPHONE ENCOUNTER
TC to [employer]. Verified pt name// Plan #. Advised that pre certifications, in which a patient was renewing their approval period could be sent with the same [employer] form as the initial request. Verified private fax #. Fax of updated records and completed [facility] pre-certification request form faxed to [phone].      # [phone]

## 2024-10-28 ENCOUNTER — HOSPITAL ENCOUNTER (OUTPATIENT)
Facility: HOSPITAL | Age: 54
Discharge: HOME | End: 2024-10-28
Attending: FAMILY MEDICINE
Payer: MEDICAID

## 2024-10-28 VITALS
TEMPERATURE: 98 F | SYSTOLIC BLOOD PRESSURE: 119 MMHG | HEART RATE: 89 BPM | DIASTOLIC BLOOD PRESSURE: 79 MMHG | RESPIRATION RATE: 18 BRPM

## 2024-10-28 DIAGNOSIS — T70.0XXD OTITIC BAROTRAUMA, SUBSEQUENT ENCOUNTER: ICD-10-CM

## 2024-10-28 DIAGNOSIS — L97.412 DIABETIC ULCER OF RIGHT MIDFOOT ASSOCIATED WITH TYPE 2 DIABETES MELLITUS, WITH FAT LAYER EXPOSED (HCC): ICD-10-CM

## 2024-10-28 DIAGNOSIS — M14.671 CHARCOT'S JOINT OF RIGHT FOOT: Primary | ICD-10-CM

## 2024-10-28 DIAGNOSIS — E11.621 DIABETIC ULCER OF RIGHT MIDFOOT ASSOCIATED WITH TYPE 2 DIABETES MELLITUS, WITH FAT LAYER EXPOSED (HCC): ICD-10-CM

## 2024-10-28 DIAGNOSIS — M86.171 ACUTE OSTEOMYELITIS OF RIGHT FOOT: ICD-10-CM

## 2024-10-28 LAB
GLUCOSE BLD STRIP.AUTO-MCNC: 160 MG/DL (ref 65–117)
SERVICE CMNT-IMP: ABNORMAL

## 2024-10-28 PROCEDURE — 82962 GLUCOSE BLOOD TEST: CPT

## 2024-10-28 PROCEDURE — G0277 HBOT, FULL BODY CHAMBER, 30M: HCPCS

## 2024-10-28 RX ORDER — DOXYCYCLINE HYCLATE 100 MG/1
100 CAPSULE ORAL 2 TIMES DAILY
COMMUNITY
Start: 2024-10-18

## 2024-10-28 ASSESSMENT — PAIN SCALES - GENERAL
PAINLEVEL_OUTOF10: 0
PAINLEVEL_OUTOF10: 0

## 2024-10-28 NOTE — WOUND CARE
Discharge Condition: Stable    Pain: 0    Ambulatory Status: Knee Scooter    Discharge Destination:     Transportation:carhome    Accompanied by: FAMILY    Discharge instructions reviewed with FAMILY and copy or written instructions have been provided. All questions/concerns have been addressed at this time.

## 2024-10-28 NOTE — PROGRESS NOTES
HBO PROGRESS NOTE  NAME: Sesar Echavarria   MEDICAL RECORD NUMBER:  [id number]  AGE: 54 y.o. GENDER: female  : 1970  EPISODE DATE:  10/28/2024     Subjective     HBO Treatment Number: 13 out of Total Treatments: 30    HBO Diagnosis:   Del Rosario grade 3 osteomyelitis of right diabetic foot ulcer      Safety checks performed prior to treatment.  See doc flowsheets for documentation.    Objective          POCGLU - 10/28/24- pre treatment  -   0815      160 mg/dl                     10/28/24 - post treatment -  1119      157 She mg/dl         Pre treatment Vital Signs       Temp: 98.1 °F (36.7 °C)     Pulse: 98     Respirations: 18   BP: 120/81       Post treatment Vital Signs  Temp: 98 °F (36.7 °C)  Pulse: 89  Respirations: 18  BP: 119/79           Assessment        Physical Exam:  General Appearance: healthy, pleasant, WDWN in NAD. A&O x 4. Coherent. Cooperative. No focal findings.     Tympanic Membrane Assessment:- tubes in each ear  Pre-Treatment Left  0 Right 0   Post-treatment Left  0 Right 0     Pulmonary/Chest: lungs clear to auscultation, breath sounds equal and symmetric, no rhonchi, rales or wheezes, no accessory muscle use    Cardiovascular: normal    Patient place in a full body Monoplace serial number         Treatment Start Time: 0900 Door Closed      Pressure Reached  PARVEEN : 2.5 PARVEEN  Number of Air Breaks: Two 5 minute air breaks for every 30 minutes oxygen        Decompression Time   Treatment End Time: 1113 Door Opened    Symptoms Noted During Treatment: None    Length of Treatment: 90 Minutes    Patient did well.  Slight anxious perhaps now returning for her HBO after 2 weeks hiatus due to procedure on her right foot and post op infection which has healed.  Due to see Dr Moreno this Thursday to have sutures remove.  Patient has no fever and is feeling well. She comments that her skin is looking much better.           I was present on these premises and immediately available to furnish assistance &

## 2024-10-29 ENCOUNTER — HOSPITAL ENCOUNTER (OUTPATIENT)
Facility: HOSPITAL | Age: 54
Discharge: HOME | End: 2024-10-29
Attending: SURGERY
Payer: MEDICAID

## 2024-10-29 VITALS
HEART RATE: 86 BPM | RESPIRATION RATE: 18 BRPM | SYSTOLIC BLOOD PRESSURE: 128 MMHG | DIASTOLIC BLOOD PRESSURE: 82 MMHG | TEMPERATURE: 97.8 F

## 2024-10-29 DIAGNOSIS — T70.0XXD OTITIC BAROTRAUMA, SUBSEQUENT ENCOUNTER: ICD-10-CM

## 2024-10-29 DIAGNOSIS — E11.621 DIABETIC ULCER OF RIGHT MIDFOOT ASSOCIATED WITH TYPE 2 DIABETES MELLITUS, WITH FAT LAYER EXPOSED (HCC): ICD-10-CM

## 2024-10-29 DIAGNOSIS — M14.671 CHARCOT'S JOINT OF RIGHT FOOT: Primary | ICD-10-CM

## 2024-10-29 DIAGNOSIS — L97.412 DIABETIC ULCER OF RIGHT MIDFOOT ASSOCIATED WITH TYPE 2 DIABETES MELLITUS, WITH FAT LAYER EXPOSED (HCC): ICD-10-CM

## 2024-10-29 DIAGNOSIS — M86.171 ACUTE OSTEOMYELITIS OF RIGHT FOOT: ICD-10-CM

## 2024-10-29 LAB
GLUCOSE BLD STRIP.AUTO-MCNC: 142 MG/DL (ref 65–117)
GLUCOSE BLD STRIP.AUTO-MCNC: 152 MG/DL (ref 65–117)
SERVICE CMNT-IMP: ABNORMAL
SERVICE CMNT-IMP: ABNORMAL

## 2024-10-29 PROCEDURE — 99183 HYPERBARIC OXYGEN THERAPY: CPT | Performed by: SURGERY

## 2024-10-29 PROCEDURE — 82962 GLUCOSE BLOOD TEST: CPT

## 2024-10-29 PROCEDURE — G0277 HBOT, FULL BODY CHAMBER, 30M: HCPCS

## 2024-10-29 ASSESSMENT — PAIN SCALES - GENERAL
PAINLEVEL_OUTOF10: 0
PAINLEVEL_OUTOF10: 0

## 2024-10-29 NOTE — PROGRESS NOTES
HBO PROGRESS NOTE      NAME: Sesar Echavarria  MEDICAL RECORD NUMBER:  [id number]  AGE: 54 y.o.   GENDER: female  : 1970  EPISODE DATE:  10/29/2024     Subjective     HBO Treatment Number: 14 out of Total Treatments: 30    HBO Diagnosis: Del Rosario grade 3 ulcer with osteomyelitis of right diabetic foot             Indications: Lower Extremity Diabetic Wound ___(site) (Right foot)    Safety checks performed prior to treatment.  See doc flowsheets for documentation.    Objective        Recent Labs     10/29/24  0843 10/29/24  1112   POCGLU 152* 142*     Pre treatment Vital Signs       Temp: 97 °F (36.1 °C)     Pulse: 91     Respirations: 16     BP: 116/81       Post treatment Vital Signs  Temp: 97.8 °F (36.6 °C)  Pulse: 86  Respirations: 18  BP: 128/82    Assessment        HBO Diagnosis:   Problem List Items Addressed This Visit          Non-Hospital    Charcot's joint of right foot - Primary    Relevant Orders    Hypoglycemial protocol    POCT glucose    Diabetic ulcer of right midfoot associated with type 2 diabetes mellitus, with fat layer exposed (HCC)    Relevant Orders    Hypoglycemial protocol    POCT glucose    Acute osteomyelitis of right foot    Relevant Orders    Hypoglycemial protocol    POCT glucose    Otic barotrauma       Physical Exam        General Appearance:  alert and oriented to person, place and time, well-developed and well-nourished, in no acute distress and well-developed and well nourished    Pre Tympanic Membrane Assessment:  Right: Grade 0  Left: Grade 0    Post Tympanic Membrane Assessment:  Left: Grade 0  Right: Grade 0    Pulmonary/Chest:  clear to auscultation bilaterally- no wheezes, rales or rhonchi, normal air movement, no respiratory distress    Cardiovascular:  regular rate and rhythm    Plan        Patient placed in a full body Monoplace Chamber #: [id number]  Treatment Start Time: 0857     Pressure Reached Time: 0913  PARVEEN : 2.5  Number of Air Breaks:  Treatment Status: Back to

## 2024-10-29 NOTE — WOUND CARE
Discharge Condition: Stable    Pain: 0    Ambulatory Status: Knee Scooter    Discharge Destination: home    Transportation:car    Accompanied by: SELF    Discharge instructions reviewed with SELF and copy or written instructions have been provided. All questions/concerns have been addressed at this time.

## 2024-10-30 ENCOUNTER — TELEPHONE (OUTPATIENT)
Facility: HOSPITAL | Age: 54
End: 2024-10-30

## 2024-10-30 ENCOUNTER — HOSPITAL ENCOUNTER (OUTPATIENT)
Facility: HOSPITAL | Age: 54
Discharge: HOME | End: 2024-10-30
Attending: FAMILY MEDICINE
Payer: MEDICAID

## 2024-10-30 VITALS
HEART RATE: 85 BPM | TEMPERATURE: 98 F | SYSTOLIC BLOOD PRESSURE: 124 MMHG | DIASTOLIC BLOOD PRESSURE: 83 MMHG | RESPIRATION RATE: 18 BRPM

## 2024-10-30 DIAGNOSIS — M14.671 CHARCOT'S JOINT OF RIGHT FOOT: Primary | ICD-10-CM

## 2024-10-30 DIAGNOSIS — E11.621 DIABETIC ULCER OF RIGHT MIDFOOT ASSOCIATED WITH TYPE 2 DIABETES MELLITUS, WITH FAT LAYER EXPOSED (HCC): ICD-10-CM

## 2024-10-30 DIAGNOSIS — L97.412 DIABETIC ULCER OF RIGHT MIDFOOT ASSOCIATED WITH TYPE 2 DIABETES MELLITUS, WITH FAT LAYER EXPOSED (HCC): ICD-10-CM

## 2024-10-30 DIAGNOSIS — T70.0XXD OTITIC BAROTRAUMA, SUBSEQUENT ENCOUNTER: ICD-10-CM

## 2024-10-30 DIAGNOSIS — M86.171 ACUTE OSTEOMYELITIS OF RIGHT FOOT: ICD-10-CM

## 2024-10-30 LAB
GLUCOSE BLD STRIP.AUTO-MCNC: 130 MG/DL (ref 65–117)
GLUCOSE BLD STRIP.AUTO-MCNC: 155 MG/DL (ref 65–117)
SERVICE CMNT-IMP: ABNORMAL
SERVICE CMNT-IMP: ABNORMAL

## 2024-10-30 PROCEDURE — 82962 GLUCOSE BLOOD TEST: CPT

## 2024-10-30 PROCEDURE — G0277 HBOT, FULL BODY CHAMBER, 30M: HCPCS

## 2024-10-30 ASSESSMENT — PAIN SCALES - GENERAL
PAINLEVEL_OUTOF10: 0
PAINLEVEL_OUTOF10: 0

## 2024-10-30 NOTE — PROGRESS NOTES
HBO PROGRESS NOTE      NAME: Sesar Echavarria  MEDICAL RECORD NUMBER:  [id number]  AGE: 54 y.o.   GENDER: female  : 1970  EPISODE DATE:  10/30/2024     Subjective     HBO Treatment Number: 15 out of Total Treatments: 30    HBO Diagnosis:             Indications: Lower Extremity Diabetic Wound ___(site) (Right foot)    Safety checks performed prior to treatment.  See doc flowsheets for documentation.    Objective        Recent Labs     10/30/24  1340 10/30/24  1605   POCGLU 155* 130*     Pre treatment Vital Signs       Temp: 98 °F (36.7 °C)     Pulse: 96     Respirations: 18     BP: 134/85       Post treatment Vital Signs  Temp: 98 °F (36.7 °C)  Pulse: 85  Respirations: 18  BP: 124/83    Assessment        HBO Diagnosis:   Problem List Items Addressed This Visit          Endocrine    Diabetic ulcer of right midfoot associated with type 2 diabetes mellitus, with fat layer exposed (HCC)    Relevant Orders    Notify physician (specify)    Hyperbaric Oxygen Therapy    Hypoglycemial protocol    POCT glucose    Care order/instruction    Care order/instruction       Other    Charcot's joint of right foot - Primary    Relevant Orders    Hypoglycemial protocol    POCT glucose    Care order/instruction    Care order/instruction    Acute osteomyelitis of right foot    Relevant Orders    Notify physician (specify)    Hyperbaric Oxygen Therapy    Hypoglycemial protocol    POCT glucose    Care order/instruction    Care order/instruction    Otic barotrauma    Relevant Orders    Notify physician (specify)    Hyperbaric Oxygen Therapy       Physical Exam        General Appearance:  alert and oriented to person, place and time, well-developed and well-nourished, in no acute distress    Pre Tympanic Membrane Assessment:  Right: Grade 0  Left: Grade 0  PE tubes in place  Post Tympanic Membrane Assessment:          Pulmonary/Chest:  no respiratory distress, respirations nonlabored      Cardiovascular:  normal on

## 2024-10-30 NOTE — FLOWSHEET NOTE
10/30/24 1628   Wound 08/14/24 Foot Right;Plantar #1   Date First Assessed/Time First Assessed: 08/14/24 1521   Present on Original Admission: Yes  Wound Approximate Age at First Assessment (Weeks): 52 weeks  Primary Wound Type: Surgical Type  Location: Foot  Wound Location Orientation: Right;Plantar  Wou...   Wound Etiology Surgical   Dressing Status New dressing applied   Wound Cleansed Cleansed with saline   Dressing/Treatment Betadine swabs/povidone iodine;Gauze dressing/dressing sponge;Roll gauze;Tape change   Offloading for Diabetic Foot Ulcers Knee scooter     Discharge Condition: Stable    Pain: 0    Ambulatory Status: Knee Scooter    Discharge Destination: home    Transportation:car    Accompanied by: FAMILY    Discharge instructions reviewed with SELF and copy or written instructions have been provided. All questions/concerns have been addressed at this time.

## 2024-10-30 NOTE — TELEPHONE ENCOUNTER
TC to [employer] pre-authorization department. Spoke with ketan Ackerman. Verified patient's name// plan ID/ NPI # and  code. Advised that patient is still currently within approval period, and my receive her last treatment under this approval period 2024.     Agent reports that fax sent 10/27/2024 for extended approval period of pre-authorization was received, but not yet reviewed. Advised to re-submit to urgent line fax at [phone].     Call confimation under  case #: [id number]

## 2024-10-31 ENCOUNTER — HOSPITAL ENCOUNTER (OUTPATIENT)
Facility: HOSPITAL | Age: 54
Discharge: HOME | End: 2024-10-31
Attending: ORTHOPAEDIC SURGERY
Payer: MEDICAID

## 2024-10-31 VITALS
TEMPERATURE: 98.2 F | DIASTOLIC BLOOD PRESSURE: 78 MMHG | HEART RATE: 63 BPM | RESPIRATION RATE: 18 BRPM | SYSTOLIC BLOOD PRESSURE: 121 MMHG

## 2024-10-31 PROCEDURE — 29445 APPL RIGID TOT CNTC LEG CAST: CPT

## 2024-10-31 ASSESSMENT — PAIN DESCRIPTION - LOCATION: LOCATION: FOOT

## 2024-10-31 ASSESSMENT — PAIN SCALES - GENERAL: PAINLEVEL_OUTOF10: 10

## 2024-10-31 ASSESSMENT — PAIN DESCRIPTION - ORIENTATION: ORIENTATION: RIGHT

## 2024-10-31 ASSESSMENT — PAIN DESCRIPTION - DESCRIPTORS: DESCRIPTORS: OTHER (COMMENT)

## 2024-10-31 NOTE — WOUND CARE
Total Contact Cast    NAME:  Sesar Echavarria  YOB: 1970  MEDICAL RECORD NUMBER:  [id number]  DATE:  10/31/2024    Goal:  Patient will maintain integrity of cast, avoid mobility hazards, and report complications that may occur (foul odor, pain, numbness, cracked cast).    Removed old contact cast if indicated and wash extremity with soap and water.  Applied ordered dressing.  Applied per Dr. Moreno  Applied in clinic to right lower leg.  Allow cast to dry.   Instructed patient to report to health care provider, including wound care center, any back pain, hip pain, or leg pain, numbness of toes, or any odor coming from the cast.   Instructed patient not to stick any foreign objects down into cast.  Instructed patient to utilize assistive devices(crutches, cane or walker) as ordered.  Instructed patient to continue offloading as directed.     Applied cast per  Guidelines  Response to treatment: Patient tolerated treatment with mild discomfort but relieved after procedure was completed, Provider notified of pain related to procedure, and Other physician modified cast with foam to posterior portion of cast was instructed to go to [facility] ortho if further problems arise.    Electronically signed by Carrie Kimbrough LPN on 10/31/2024 at 11:18 AM  
300 each 3    Insulin Pen Needle (PEN NEEDLES) 32G X 4 MM MISC Use to inject insulin 4 times daily. Dx code E11.65 400 each 3    hydrOXYzine HCl (ATARAX) 25 MG tablet Take 1 tablet by mouth nightly as needed for Itching 90 tablet 1    Ashwagandha 500 MG CAPS Take 500 mg by mouth daily      Nutritional Supplements (NUTRITIONAL SUPPLEMENT PLUS PO) Take 1 tablet by mouth daily Indications: Self reported, CBD chewable supplement Self reported, CBD chewable supplement      ferrous sulfate (IRON 325) 325 (65 Fe) MG tablet Take 1 tablet by mouth daily (with breakfast)      lisinopril (ZESTRIL) 2.5 MG tablet Take 1 tablet by mouth daily 30 tablet 3    insulin glargine (LANTUS SOLOSTAR) 100 UNIT/ML injection pen Inject 15 Units into the skin nightly 15 mL 3    insulin lispro, 1 Unit Dial, (HUMALOG KWIKPEN) 100 UNIT/ML SOPN Use with SSI. Max units daily: 30 units 30 mL 3    blood glucose test strips (TRUE METRIX BLOOD GLUCOSE TEST) strip Use to check BG 3 times daily. Dx code E11.65 300 each 3    Dulaglutide (TRULICITY) 4.5 MG/0.5ML SOPN Inject 4.5 mg into the skin once a week E11.65 12 Adjustable Dose Pre-filled Pen Syringe 3    Acetaminophen (TYLENOL PO) Take 625 mg by mouth daily      TURMERIC PO 1 tablet by Other route daily      triamcinolone (KENALOG) 0.1 % cream  (Patient not taking: Reported on 10/28/2024)      glucose monitoring kit 1 kit by Does not apply route daily 1 kit 0    nystatin (MYCOSTATIN) 812361 UNIT/GM cream  (Patient not taking: Reported on 10/28/2024)      potassium bicarbonate (EFFER-K/K-LYTE) 25 MEQ disintegrating tablet Take 1 tablet by mouth daily      Multiple Vitamin (DAILY VITES) TABS Take 1 tablet by mouth daily      mometasone (ELOCON) 0.1 % cream  (Patient not taking: Reported on 10/28/2024)      MAGNESIUM ASPARTATE PO Take 1 tablet by mouth daily      dupilumab (DUPIXENT) 300 MG/2ML SOPN injection Inject 2 mLs into the skin every 14 days      Cyanocobalamin 100 MCG LOZG Take by mouth 
Cast:  Apply: [x] Total Contact Cast Applied in Clinic to [x]RightLeg []Left Leg   [x] Do not get cast wet.  Contact wound center or go to emergency room if there is a foul odor or becomes uncomfortable due to feeling tight or swelling.  Do not use objects inside of cast to scratch.      Dietary:  [] Diet as tolerated: [x] Diabetic Diet: [] No Added Salt:  [x] Increase Protein: [] Other:     Activity:  [x] Activity as tolerated  [] Patient has no activity restrictions      [] Strict Bedrest   [] Remain off Work:       [] May return to full duty work                                    [] Return to work with restrictions:     Physician:  [] Dr. Edith Elmore  [] Dr. Jennifer Tomlin   [] Dr. Terry Serrano  [] Raji Nye PA-C  [x] Dr. Ashutosh Moreno  [] Dr. Brian Milligan  [] Dr. Lissette Howe    Nurse Case Manger:  VITALIY CHAVEZ RN      Wound Care Center Information: Should you experience any significant changes in your wound(s) or have questions about your wound care, please contact the Wound Center at [phone]. Our hours are Monday - Friday 8am - 4:30pm, closed all major holidays. If you need help with your wound outside these hours and cannot wait until we are again available, contact your PCP or go to the hospital emergency room.     PLEASE NOTE: IF YOU ARE UNABLE TO OBTAIN WOUND SUPPLIES, CONTINUE TO USE THE SUPPLIES YOU HAVE AVAILABLE UNTIL YOU ARE ABLE TO REACH US. IT IS MOST IMPORTANT TO KEEP THE WOUND COVERED AT ALL TIMES.

## 2024-10-31 NOTE — DISCHARGE INSTRUCTIONS
Megan Osorio RN  Registered Nurse     Wound Care     Signed     Date of Service: 10/31/2024 10:30 AM     Signed                          Wound Clinic Physician Orders and Discharge Instructions  [facility]  [facility] [address], [address] [place] [address]  Telephone: [phone]     FAX [phone]     NAME:  Sesar Echavarria  YOB: 1970  MEDICAL RECORD NUMBER:  [id number]  DATE:  10/31/2024        Return Appointment:  [] Dressing Supply Provider:   [] Home Healthcare:  [x] Return Appointment: 1 Week(s)  [] Nurse Visit:       [] Discharge from [facility]: [] Healed        [] Refer to Provider:         [] Consult     Follow-up Information:  [] Ordered Tests:   [] Referrals:   [] Rx:    [] Would Culture:  [] Other:         Wound Cleansing:   Do not scrub or use excessive force.  Cleanse wound prior to applying a clean dressing with:  [] Normal Saline          [] Cleanse wound with Mild Soap & Water     [] Wound Cleanser   [x] Keep Wound Dry in Shower  [x] Wear a cast cover to shower  [] Other:        Topical Treatments:  Do not apply lotions, creams, or ointments to wound bed unless directed.   [] Apply moisturizing lotion to skin surrounding the wound prior to dressing change.  [] Apply antifungal ointment to skin surrounding the wound prior to dressing change.  [] Apply thin film of moisture barrier ointment to skin immediately around wound.  [] Apply Betadine to skin immediately around wound   [] Apply Skin Prep to skin immediately around wound  [] Other:                 Dressings:                  Wound Location RIGHT FOOT         [x] Apply Primary Dressing:                                          [] MediHoney Gel      [] MediHoney Alginate  [x] Calcium Alginate with Silver   [] Calcium Alginate without silver              [] Collagen with silver            [] Collagen without Silver    [] Santyl with moist saline gauze                [] Hydrofera Blue (cut to size

## 2024-11-04 ENCOUNTER — TELEPHONE (OUTPATIENT)
Facility: HOSPITAL | Age: 54
End: 2024-11-04

## 2024-11-04 NOTE — TELEPHONE ENCOUNTER
TC to [employer] Pre certification line. Verified patients name/ / Plan number and case ID. Spoke with Bri Dave. Advised that all faxes had been received for re-certification. Answer is expected on 2024. CPT code requested is considered elective and non- emergent by agent/ plan. Normal turn around time applies according to agent.     Spoke with Nick Gregory  Provided urgent fax number if case becomes non-elective/ urgent: [phone]

## 2024-11-05 ENCOUNTER — TELEPHONE (OUTPATIENT)
Facility: HOSPITAL | Age: 54
End: 2024-11-05

## 2024-11-05 NOTE — TELEPHONE ENCOUNTER
TC to [employer] pre-certification and in take team. Verified patient / plan ID/ MD/ Facility NPI. Spoke with Claudia- agent. Verified that NPI for provider/ facility on pre-certification are correct. According to automated system patient pre-certification has been approved, but no letter generated or sent to facility.     Requested confirmation of pre-authorization. Agent advised that this should be faxed by EOD to [phone]. Call/ Authorization # [id number]

## 2024-11-06 ENCOUNTER — HOSPITAL ENCOUNTER (OUTPATIENT)
Facility: HOSPITAL | Age: 54
Discharge: HOME | End: 2024-11-06
Attending: FAMILY MEDICINE
Payer: MEDICAID

## 2024-11-06 VITALS
SYSTOLIC BLOOD PRESSURE: 148 MMHG | HEART RATE: 103 BPM | TEMPERATURE: 97.6 F | RESPIRATION RATE: 18 BRPM | DIASTOLIC BLOOD PRESSURE: 84 MMHG

## 2024-11-06 PROCEDURE — 99212 OFFICE O/P EST SF 10 MIN: CPT

## 2024-11-06 PROCEDURE — 99213 OFFICE O/P EST LOW 20 MIN: CPT

## 2024-11-06 NOTE — PATIENT INSTRUCTIONS
Discharge Instructions/Wound Care Orders  [facility]   [address] [address]  Phone: [phone] Fax: [phone]    NAME:  Sesar Echavarria  YOB: 1970  MEDICAL RECORD NUMBER:  [id number]  DATE:  November 6, 2024    Wound Care Orders:  Right foot wound and surgical site-Cleanse with baby shampoo. Allow to lather. Rinse and pat dry. Apply betadine wet to dry dressing. Secure with roll gauze and tape. Apply tubigrip size F.   Wear EvenUp shoe when walking. Limit walking as much as possible.       Activity:  [x] Elevate leg(s) above the level of the heart when sitting and avoid prolonged standing in one place.    [x] Wear off-loading shoe/boot on the affected foot when walking. Limit walking.   [x] Do not get dressing wet.    Dietary:  [] Diet as tolerated      [x] Diabetic Diet            [x] Increase Protein: examples (Meat, cheese, eggs, greek yogurt, fish, nuts)          [x] Nolan Therapeutic Nutrition Powder  [] Other:  [] Dial a Dietician : Call [employer] at [phone] enter code (249) when prompted. M-F 9am-5pm EST.   Follow-up:  [x] Return Appointment: With Dr. Edith Elmore in 1 Week.  [] Ordered tests:       Wound Care Center Information: Should you experience any significant changes in your wound(s) or have questions about your wound care, please contact the [facility] at MONDAY - FRIDAY 8:00 am - 4:30.  If you need help with your wound outside these hours and cannot wait until we are again available, contact your PCP or go to the hospital emergency room.     PLEASE NOTE: IF YOU ARE UNABLE TO OBTAIN WOUND SUPPLIES, CONTINUE TO USE THE SUPPLIES YOU HAVE AVAILABLE UNTIL YOU ARE ABLE TO REACH US. IT IS MOST IMPORTANT TO KEEP THE WOUND COVERED AT ALL TIMES.     Physician Signature:_______________________    Date: ___________ Time:  ____________

## 2024-11-06 NOTE — FLOWSHEET NOTE
11/06/24 1545   Left Leg Edema Point of Measurement   Leg circumference 38.5 cm   Ankle circumference 22.5 cm   Peripheral Vascular   Edema Right lower extremity   RLE Edema +1;Non-pitting   RLE Neurovascular Assessment   Capillary Refill Less than/Equal to 3 seconds   Color Appropriate for Ethnicity   Temperature Warm   RLE Sensation  Decreased   R Pedal Pulse +2     BP (!) 148/84   Pulse (!) 103   Temp 97.6 °F (36.4 °C) (Temporal)   Resp 18

## 2024-11-06 NOTE — FLOWSHEET NOTE
11/06/24 1636   Right Leg Edema Point of Measurement   Compression Therapy Tubular elastic support bandage   Stockings Compression Pressure Low   Wound 08/14/24 Foot Right;Plantar #1   Date First Assessed/Time First Assessed: 08/14/24 1521   Present on Original Admission: Yes  Wound Approximate Age at First Assessment (Weeks): 52 weeks  Primary Wound Type: Surgical Type  Location: Foot  Wound Location Orientation: Right;Plantar  Wou...   Dressing Status New dressing applied   Dressing/Treatment   (betadine wet to dry)   Offloading for Diabetic Foot Ulcers Knee scooter        What Type Of Note Output Would You Prefer (Optional)?: Bullet Format Hpi Title: Evaluation of Skin Lesions How Severe Are Your Spot(S)?: moderate Additional History: Lesion on right forehead. Present for 2 months. Asymptomatic. Declines chaperone.

## 2024-11-06 NOTE — PROGRESS NOTES
Difficulty of Paying Living Expenses: Not hard at all   Food Insecurity: No Food Insecurity (7/22/2024)    [facility]    Hunger Vital Sign     Worried About Running Out of Food in the Last Year: Never true     Ran Out of Food in the Last Year: Never true   Transportation Needs: No Transportation Needs (7/22/2024)    [facility]    PRAPARE - Transportation     Lack of Transportation (Medical): No     Lack of Transportation (Non-Medical): No   Physical Activity: Insufficiently Active (7/21/2022)    Exercise Vital Sign     Days of Exercise per Week: 1 day     Minutes of Exercise per Session: 10 min   Intimate Partner Violence: Not At Risk (7/21/2022)    Humiliation, Afraid, Rape, and Kick questionnaire     Fear of Current or Ex-Partner: No     Emotionally Abused: No     Physically Abused: No     Sexually Abused: No   Housing Stability: Low Risk  (7/22/2024)    [facility]    Housing Stability Vital Sign     In the last 12 months, was there a time when you were not able to pay the mortgage or rent on time?: No     In the past 12 months, how many times have you moved where you were living?: 0     At any time in the past 12 months, were you homeless or living in a shelter (including now)?: No        Prior to Admission medications    Medication Sig Start Date End Date Taking? Authorizing Provider   VIBRAMYCIN 100 MG capsule Take 1 capsule by mouth 2 times daily  Patient not taking: Reported on 10/29/2024 10/18/24   Provider, MD Michael   Lancets MISC Check BG 3 times daily. Dx code E11.65 10/11/24   Yusra Washburn MD   Insulin Pen Needle (PEN NEEDLES) 32G X 4 MM MISC Use to inject insulin 4 times daily. Dx code E11.65 10/11/24   Yusra Washburn MD   hydrOXYzine HCl (ATARAX) 25 MG tablet Take 1 tablet by mouth nightly as needed for Itching 10/4/24   Arabella Moreira DO   Ashwagandha 500 MG CAPS Take 500 mg by mouth daily     negative

## 2024-11-07 ENCOUNTER — HOSPITAL ENCOUNTER (OUTPATIENT)
Facility: HOSPITAL | Age: 54
Discharge: HOME | End: 2024-11-07
Attending: ORTHOPAEDIC SURGERY
Payer: MEDICAID

## 2024-11-07 VITALS
TEMPERATURE: 97.9 F | RESPIRATION RATE: 18 BRPM | SYSTOLIC BLOOD PRESSURE: 139 MMHG | DIASTOLIC BLOOD PRESSURE: 83 MMHG | HEART RATE: 101 BPM

## 2024-11-07 PROCEDURE — 99212 OFFICE O/P EST SF 10 MIN: CPT

## 2024-11-07 RX ORDER — NEOMYCIN/BACITRACIN/POLYMYXINB 3.5-400-5K
OINTMENT (GRAM) TOPICAL ONCE
OUTPATIENT
Start: 2024-11-07 | End: 2024-11-07

## 2024-11-07 RX ORDER — LIDOCAINE HYDROCHLORIDE 40 MG/ML
SOLUTION TOPICAL ONCE
OUTPATIENT
Start: 2024-11-07 | End: 2024-11-07

## 2024-11-07 RX ORDER — GENTAMICIN SULFATE 1 MG/G
OINTMENT TOPICAL ONCE
OUTPATIENT
Start: 2024-11-07 | End: 2024-11-07

## 2024-11-07 RX ORDER — BETAMETHASONE DIPROPIONATE 0.5 MG/G
CREAM TOPICAL ONCE
OUTPATIENT
Start: 2024-11-07 | End: 2024-11-07

## 2024-11-07 RX ORDER — LIDOCAINE 50 MG/G
OINTMENT TOPICAL ONCE
OUTPATIENT
Start: 2024-11-07 | End: 2024-11-07

## 2024-11-07 RX ORDER — LIDOCAINE HYDROCHLORIDE 20 MG/ML
JELLY TOPICAL ONCE
OUTPATIENT
Start: 2024-11-07 | End: 2024-11-07

## 2024-11-07 RX ORDER — MUPIROCIN 20 MG/G
OINTMENT TOPICAL ONCE
OUTPATIENT
Start: 2024-11-07 | End: 2024-11-07

## 2024-11-07 RX ORDER — LIDOCAINE 40 MG/G
CREAM TOPICAL ONCE
OUTPATIENT
Start: 2024-11-07 | End: 2024-11-07

## 2024-11-07 RX ORDER — CLOBETASOL PROPIONATE 0.5 MG/G
OINTMENT TOPICAL ONCE
OUTPATIENT
Start: 2024-11-07 | End: 2024-11-07

## 2024-11-07 RX ORDER — GINSENG 100 MG
CAPSULE ORAL ONCE
OUTPATIENT
Start: 2024-11-07 | End: 2024-11-07

## 2024-11-07 RX ORDER — TRIAMCINOLONE ACETONIDE 1 MG/G
OINTMENT TOPICAL ONCE
OUTPATIENT
Start: 2024-11-07 | End: 2024-11-07

## 2024-11-07 RX ORDER — SODIUM CHLOR/HYPOCHLOROUS ACID 0.033 %
SOLUTION, IRRIGATION IRRIGATION ONCE
OUTPATIENT
Start: 2024-11-07 | End: 2024-11-07

## 2024-11-07 RX ORDER — BACITRACIN ZINC AND POLYMYXIN B SULFATE 500; 1000 [USP'U]/G; [USP'U]/G
OINTMENT TOPICAL ONCE
OUTPATIENT
Start: 2024-11-07 | End: 2024-11-07

## 2024-11-07 ASSESSMENT — PAIN DESCRIPTION - LOCATION: LOCATION: FOOT

## 2024-11-07 ASSESSMENT — PAIN DESCRIPTION - ORIENTATION: ORIENTATION: RIGHT

## 2024-11-07 ASSESSMENT — PAIN SCALES - GENERAL: PAINLEVEL_OUTOF10: 9

## 2024-11-07 ASSESSMENT — PAIN DESCRIPTION - DESCRIPTORS: DESCRIPTORS: SORE;PRESSURE

## 2024-11-07 NOTE — WOUND CARE
Wound Clinic Physician Orders and Discharge Instructions  [facility]  [facility] [address], [address]  [place] [address]  Telephone: [phone]     FAX [phone]    NAME:  Sesar Echavarria  YOB: 1970  MEDICAL RECORD NUMBER:  [id number]  DATE:  11/7/2024      Return Appointment:  [] Dressing Supply Provider:   [] Home Healthcare:  [] Return Appointment:   Week(s)  [] Nurse Visit:      [x] Discharge from [facility]: [x] Healed        [] Refer to Provider:         [] Consult    Follow-up Information:  [] Ordered Tests:   [x] Referrals: FOLLOW UP WITH DR KAUR AT [facility] AFTER PATIENT GETS BRACE.   [] Rx:    [] Would Culture:  [] Other:       Wound Cleansing:   Do not scrub or use excessive force.  Cleanse wound prior to applying a clean dressing with:  [] Normal Saline   [] Cleanse wound with Mild Soap & Water     [] Wound Cleanser   [] Keep Wound Dry in Shower  [] Wear a cast cover to shower  [] Other:       Topical Treatments:  Do not apply lotions, creams, or ointments to wound bed unless directed.   [] Apply moisturizing lotion to skin surrounding the wound prior to dressing change.  [] Apply antifungal ointment to skin surrounding the wound prior to dressing change.  [] Apply thin film of moisture barrier ointment to skin immediately around wound.  [] Apply Betadine to skin immediately around wound   [] Apply Skin Prep to skin immediately around wound  [] Other:      Dressings:           Wound Location RIGHT FOOT    [] Apply Primary Dressing:       [] MediHoney Gel [] MediHoney Alginate  [] Calcium Alginate with Silver   [] Calcium Alginate without silver   [] Collagen with silver [] Collagen without Silver    [] Santyl with moist saline gauze     [] Hydrofera Blue (cut to size and moistened with normal saline)  [] Hydrofera Blue Ready (cut to size)      [] Normal Saline wet to dry  [] Betadine wet to dry    [] Hydrogel  [] Mepitel     [] 
immediately around wound.  [] Apply Betadine to skin immediately around wound   [] Apply Skin Prep to skin immediately around wound  [] Other:                 Dressings:                  Wound Location RIGHT FOOT         [] Apply Primary Dressing:                                          [] MediHoney Gel      [] MediHoney Alginate  [] Calcium Alginate with Silver   [] Calcium Alginate without silver              [] Collagen with silver            [] Collagen without Silver    [] Santyl with moist saline gauze                [] Hydrofera Blue (cut to size and moistened with normal saline)    [] Hydrofera Blue Ready (cut to size)                      [] Normal Saline wet to dry    [] Betadine wet to dry                          [] Hydrogel                 [] Mepitel                       [] Bactroban/Mupirocin              [] Iodoform Packing Strip      [] Plain Packing Strip              [] Skin Sub:   [] Other:       [] Cover and Secure with:                   [] Gauze         [] Kendall           [] Kerlix              [] Zetuvit Plus Silicone Border or Foam Border        [] Super Absorbant    [] ABD                              [] Ace Wrap   [] Other: FOAM               Limit contact of tape with skin.     [] Change dressing:   [] Daily           [] Every Other Day    [] Twice per week   [] Three times per week              [] Once a week          [] Do Not Change Dressing     [] Other:                Negative Pressure:           Wound Location:   [] Pressure @   mm/Hg                     []Continuous  []Intermittent              [] Black Foam            [] White Foam            [] Bridge  [] Change dressing 3 times per week           [] Skin Prep to jhonathan-wound  [] Other:      Pressure Relief:  [] When sitting, shift position or do seat lifts every 15 minutes.  [] Wheelchair cushion           [] Specialty Bed/Mattress  [] Turn every 2 hours when in bed.  Avoid direct pressure on wound site.  Limit side lying to 30

## 2024-11-07 NOTE — DISCHARGE INSTRUCTIONS
How Severe Are Your Spot(S)?: moderate What Type Of Note Output Would You Prefer (Optional)?: Bullet Format What Is The Reason For Today's Visit?: Full Body Skin Examination What Is The Reason For Today's Visit? (Being Monitored For X): concerning skin lesions on an annual basis care, please contact the Wound Center at [phone]. Our hours are Monday - Friday 8am - 4:30pm, closed all major holidays. If you need help with your wound outside these hours and cannot wait until we are again available, contact your PCP or go to the hospital emergency room.      PLEASE NOTE: IF YOU ARE UNABLE TO OBTAIN WOUND SUPPLIES, CONTINUE TO USE THE SUPPLIES YOU HAVE AVAILABLE UNTIL YOU ARE ABLE TO REACH US. IT IS MOST IMPORTANT TO KEEP THE WOUND COVERED AT ALL TIMES.

## 2024-11-11 ENCOUNTER — TELEPHONE (OUTPATIENT)
Facility: HOSPITAL | Age: 54
End: 2024-11-11

## 2024-11-11 NOTE — TELEPHONE ENCOUNTER
TC to patient. Pt reports visit with Dr. Moreno 11/7/24. Pt reports that she was advised that she is \"healed\" from Dr. Moreno (orthopedics) standpoint and is released from his care. This was communicated with Dr. Elmore 11/11/24. Per Dr. Elmore, ok to DC HBO treatment.     This was communicated to the patient. That HBO would stop, unless that was further indication. Pt reports that she will be seeing Dr. Roy on Wednesday and get his opinion as well on her next steps.     Per DC instructions 11/7/24 patient is marked \"Healed\" and to follow up with Dr. Moreno when new ortho brace is available to her. Pt verbalizes understanding. Will follow up with clinic, if needed. Will follow up with podiatry on Wednesday.

## 2024-11-20 DIAGNOSIS — I10 ESSENTIAL HYPERTENSION: ICD-10-CM

## 2024-11-20 DIAGNOSIS — E78.2 MIXED HYPERLIPIDEMIA: ICD-10-CM

## 2024-11-20 DIAGNOSIS — Z79.4 TYPE 2 DIABETES MELLITUS WITH HYPERGLYCEMIA, WITH LONG-TERM CURRENT USE OF INSULIN (HCC): ICD-10-CM

## 2024-11-20 DIAGNOSIS — E11.65 TYPE 2 DIABETES MELLITUS WITH HYPERGLYCEMIA, WITH LONG-TERM CURRENT USE OF INSULIN (HCC): ICD-10-CM

## 2024-11-27 ENCOUNTER — HOSPITAL ENCOUNTER (OUTPATIENT)
Facility: HOSPITAL | Age: 54
Discharge: HOME | End: 2024-11-27
Attending: FAMILY MEDICINE
Payer: MEDICAID

## 2024-11-27 VITALS
HEART RATE: 105 BPM | TEMPERATURE: 97 F | DIASTOLIC BLOOD PRESSURE: 84 MMHG | RESPIRATION RATE: 16 BRPM | SYSTOLIC BLOOD PRESSURE: 126 MMHG

## 2024-11-27 DIAGNOSIS — E11.621 DIABETIC ULCER OF RIGHT MIDFOOT ASSOCIATED WITH TYPE 2 DIABETES MELLITUS, WITH FAT LAYER EXPOSED (HCC): Primary | ICD-10-CM

## 2024-11-27 DIAGNOSIS — L97.412 DIABETIC ULCER OF RIGHT MIDFOOT ASSOCIATED WITH TYPE 2 DIABETES MELLITUS, WITH FAT LAYER EXPOSED (HCC): Primary | ICD-10-CM

## 2024-11-27 PROCEDURE — 99212 OFFICE O/P EST SF 10 MIN: CPT

## 2024-11-27 NOTE — PROGRESS NOTES
Yes Provider, MD Michael   vitamin D3 (CHOLECALCIFEROL) 125 MCG (5000 UT) TABS tablet Take by mouth daily   Yes Automatic Reconciliation, Ar   hydrocortisone 1 % ointment Apply topically as needed 6/24/22  Yes Automatic Reconciliation, Ar   Cyanocobalamin 100 MCG LOZG Take by mouth  Patient not taking: Reported on 10/28/2024    Provider, MD Michael      Allergies   Allergen Reactions    Clindamycin     Doxycycline     Nirmatrelvir-Ritonavir Rash    Ritonavir Rash    Sulfa Antibiotics Rash    Sulfamethoxazole-Trimethoprim Rash     Other Reaction(s): ITCHING, NAUSEA, VOMITING          Signed By: Edith Elmore MD      111/27/24

## 2024-11-27 NOTE — PATIENT INSTRUCTIONS
Discharge Instructions/Wound Care Orders  [facility]   [address] [address]  Phone: [phone] Fax: [phone]    NAME:  Sesar Echavarria  YOB: 1970  MEDICAL RECORD NUMBER:  [id number]  DATE:  November 27, 2024    Wound Care Orders:  Congratulations your wound is healed!      Activity:  [x] Elevate leg(s) above the level of the heart when sitting and avoid prolonged standing in one place.    [x] Wear off-loading shoe/boot on the affected foot when walking. Limit walking.   [] Do not get dressing wet.    Dietary:  [] Diet as tolerated      [x] Diabetic Diet            [x] Increase Protein: examples (Meat, cheese, eggs, greek yogurt, fish, nuts)          [x] Nolan Therapeutic Nutrition Powder  [] Other:  [] Dial a Dietician : Call [employer] at [phone] enter code (249) when prompted. M-F 9am-5pm EST.   Follow-up:  [x] Return Appointment: As Needed.  [] Ordered tests:       Wound Care Center Information: Should you experience any significant changes in your wound(s) or have questions about your wound care, please contact the [facility] at MONDAY - FRIDAY 8:00 am - 4:30.  If you need help with your wound outside these hours and cannot wait until we are again available, contact your PCP or go to the hospital emergency room.     PLEASE NOTE: IF YOU ARE UNABLE TO OBTAIN WOUND SUPPLIES, CONTINUE TO USE THE SUPPLIES YOU HAVE AVAILABLE UNTIL YOU ARE ABLE TO REACH US. IT IS MOST IMPORTANT TO KEEP THE WOUND COVERED AT ALL TIMES.     Physician Signature:_______________________    Date: ___________ Time:  ____________

## 2025-01-02 ENCOUNTER — APPOINTMENT (OUTPATIENT)
Facility: HOSPITAL | Age: 55
DRG: 048 | End: 2025-01-02
Payer: MEDICAID

## 2025-01-02 ENCOUNTER — HOSPITAL ENCOUNTER (INPATIENT)
Facility: HOSPITAL | Age: 55
LOS: 2 days | Discharge: HOME | DRG: 048 | End: 2025-01-04
Attending: STUDENT IN AN ORGANIZED HEALTH CARE EDUCATION/TRAINING PROGRAM | Admitting: INTERNAL MEDICINE
Payer: MEDICAID

## 2025-01-02 DIAGNOSIS — L08.9 FOOT INFECTION: Primary | ICD-10-CM

## 2025-01-02 PROBLEM — B99.9 FEVER DUE TO INFECTION: Status: ACTIVE | Noted: 2025-01-02

## 2025-01-02 PROBLEM — D84.9 IMMUNOCOMPROMISED, ACQUIRED (HCC): Status: ACTIVE | Noted: 2025-01-02

## 2025-01-02 LAB
ALBUMIN SERPL-MCNC: 3.7 G/DL (ref 3.5–5)
ALBUMIN/GLOB SERPL: 0.9 (ref 1.1–2.2)
ALP SERPL-CCNC: 105 U/L (ref 45–117)
ALT SERPL-CCNC: 13 U/L (ref 12–78)
ANION GAP SERPL CALC-SCNC: 5 MMOL/L (ref 2–12)
ANION GAP SERPL CALC-SCNC: 5 MMOL/L (ref 2–12)
AST SERPL W P-5'-P-CCNC: 10 U/L (ref 15–37)
BASOPHILS # BLD: 0 K/UL (ref 0–0.1)
BASOPHILS NFR BLD: 0 % (ref 0–1)
BILIRUB SERPL-MCNC: 0.3 MG/DL (ref 0.2–1)
BUN SERPL-MCNC: 13 MG/DL (ref 6–20)
BUN SERPL-MCNC: 14 MG/DL (ref 6–20)
BUN/CREAT SERPL: 17 (ref 12–20)
BUN/CREAT SERPL: 18 (ref 12–20)
CA-I BLD-MCNC: 9.6 MG/DL (ref 8.5–10.1)
CA-I BLD-MCNC: 9.8 MG/DL (ref 8.5–10.1)
CHLORIDE SERPL-SCNC: 104 MMOL/L (ref 97–108)
CHLORIDE SERPL-SCNC: 108 MMOL/L (ref 97–108)
CO2 SERPL-SCNC: 25 MMOL/L (ref 21–32)
CO2 SERPL-SCNC: 27 MMOL/L (ref 21–32)
CREAT SERPL-MCNC: 0.74 MG/DL (ref 0.55–1.02)
CREAT SERPL-MCNC: 0.83 MG/DL (ref 0.55–1.02)
DIFFERENTIAL METHOD BLD: NORMAL
EKG ATRIAL RATE: 92 BPM
EKG DIAGNOSIS: NORMAL
EKG P AXIS: 39 DEGREES
EKG P-R INTERVAL: 166 MS
EKG Q-T INTERVAL: 342 MS
EKG QRS DURATION: 68 MS
EKG QTC CALCULATION (BAZETT): 422 MS
EKG R AXIS: 23 DEGREES
EKG T AXIS: 37 DEGREES
EKG VENTRICULAR RATE: 92 BPM
EOSINOPHIL # BLD: 0.2 K/UL (ref 0–0.4)
EOSINOPHIL NFR BLD: 2 % (ref 0–7)
ERYTHROCYTE [DISTWIDTH] IN BLOOD BY AUTOMATED COUNT: 12.7 % (ref 11.5–14.5)
FLUAV RNA SPEC QL NAA+PROBE: NOT DETECTED
FLUBV RNA SPEC QL NAA+PROBE: NOT DETECTED
GLOBULIN SER CALC-MCNC: 4.1 G/DL (ref 2–4)
GLUCOSE BLD STRIP.AUTO-MCNC: 124 MG/DL (ref 65–100)
GLUCOSE BLD STRIP.AUTO-MCNC: 186 MG/DL (ref 65–100)
GLUCOSE SERPL-MCNC: 140 MG/DL (ref 65–100)
GLUCOSE SERPL-MCNC: 163 MG/DL (ref 65–100)
HCT VFR BLD AUTO: 40.2 % (ref 35–47)
HGB BLD-MCNC: 13.4 G/DL (ref 11.5–16)
IMM GRANULOCYTES # BLD AUTO: 0 K/UL (ref 0–0.04)
IMM GRANULOCYTES NFR BLD AUTO: 0 % (ref 0–0.5)
LACTATE BLD-SCNC: 0.87 MMOL/L (ref 0.4–2)
LYMPHOCYTES # BLD: 3.5 K/UL (ref 0.8–3.5)
LYMPHOCYTES NFR BLD: 34 % (ref 12–49)
MCH RBC QN AUTO: 29.6 PG (ref 26–34)
MCHC RBC AUTO-ENTMCNC: 33.3 G/DL (ref 30–36.5)
MCV RBC AUTO: 88.7 FL (ref 80–99)
MONOCYTES # BLD: 0.6 K/UL (ref 0–1)
MONOCYTES NFR BLD: 6 % (ref 5–13)
NEUTS SEG # BLD: 5.8 K/UL (ref 1.8–8)
NEUTS SEG NFR BLD: 58 % (ref 32–75)
NRBC # BLD: 0 K/UL (ref 0–0.01)
NRBC BLD-RTO: 0 PER 100 WBC
PERFORMED BY:: ABNORMAL
PERFORMED BY:: ABNORMAL
PERFORMED BY:: NORMAL
PLATELET # BLD AUTO: 288 K/UL (ref 150–400)
PMV BLD AUTO: 9.4 FL (ref 8.9–12.9)
POTASSIUM SERPL-SCNC: 3.9 MMOL/L (ref 3.5–5.1)
POTASSIUM SERPL-SCNC: 4 MMOL/L (ref 3.5–5.1)
PROCALCITONIN SERPL-MCNC: <0.05 NG/ML
PROT SERPL-MCNC: 7.8 G/DL (ref 6.4–8.2)
RBC # BLD AUTO: 4.53 M/UL (ref 3.8–5.2)
SARS-COV-2 RNA RESP QL NAA+PROBE: NOT DETECTED
SODIUM SERPL-SCNC: 136 MMOL/L (ref 136–145)
SODIUM SERPL-SCNC: 138 MMOL/L (ref 136–145)
TROPONIN I SERPL HS-MCNC: <4 NG/L (ref 0–51)
WBC # BLD AUTO: 10.1 K/UL (ref 3.6–11)

## 2025-01-02 PROCEDURE — G0378 HOSPITAL OBSERVATION PER HR: HCPCS

## 2025-01-02 PROCEDURE — 36415 COLL VENOUS BLD VENIPUNCTURE: CPT

## 2025-01-02 PROCEDURE — 93005 ELECTROCARDIOGRAM TRACING: CPT | Performed by: STUDENT IN AN ORGANIZED HEALTH CARE EDUCATION/TRAINING PROGRAM

## 2025-01-02 PROCEDURE — 99285 EMERGENCY DEPT VISIT HI MDM: CPT

## 2025-01-02 PROCEDURE — 71045 X-RAY EXAM CHEST 1 VIEW: CPT

## 2025-01-02 PROCEDURE — 87154 CUL TYP ID BLD PTHGN 6+ TRGT: CPT

## 2025-01-02 PROCEDURE — 1100000000 HC RM PRIVATE

## 2025-01-02 PROCEDURE — 96366 THER/PROPH/DIAG IV INF ADDON: CPT

## 2025-01-02 PROCEDURE — 84484 ASSAY OF TROPONIN QUANT: CPT

## 2025-01-02 PROCEDURE — 80048 BASIC METABOLIC PNL TOTAL CA: CPT

## 2025-01-02 PROCEDURE — 2500000003 HC RX 250 WO HCPCS: Performed by: INTERNAL MEDICINE

## 2025-01-02 PROCEDURE — 80053 COMPREHEN METABOLIC PANEL: CPT

## 2025-01-02 PROCEDURE — 6360000002 HC RX W HCPCS: Performed by: STUDENT IN AN ORGANIZED HEALTH CARE EDUCATION/TRAINING PROGRAM

## 2025-01-02 PROCEDURE — 6370000000 HC RX 637 (ALT 250 FOR IP): Performed by: STUDENT IN AN ORGANIZED HEALTH CARE EDUCATION/TRAINING PROGRAM

## 2025-01-02 PROCEDURE — 2580000003 HC RX 258: Performed by: STUDENT IN AN ORGANIZED HEALTH CARE EDUCATION/TRAINING PROGRAM

## 2025-01-02 PROCEDURE — 6370000000 HC RX 637 (ALT 250 FOR IP): Performed by: INTERNAL MEDICINE

## 2025-01-02 PROCEDURE — 85025 COMPLETE CBC W/AUTO DIFF WBC: CPT

## 2025-01-02 PROCEDURE — 84145 PROCALCITONIN (PCT): CPT

## 2025-01-02 PROCEDURE — 83605 ASSAY OF LACTIC ACID: CPT

## 2025-01-02 PROCEDURE — 87040 BLOOD CULTURE FOR BACTERIA: CPT

## 2025-01-02 PROCEDURE — 87636 SARSCOV2 & INF A&B AMP PRB: CPT

## 2025-01-02 PROCEDURE — 82962 GLUCOSE BLOOD TEST: CPT

## 2025-01-02 PROCEDURE — 96365 THER/PROPH/DIAG IV INF INIT: CPT

## 2025-01-02 PROCEDURE — 73630 X-RAY EXAM OF FOOT: CPT

## 2025-01-02 RX ORDER — DIPHENHYDRAMINE HCL 25 MG
25 CAPSULE ORAL EVERY 6 HOURS PRN
Status: DISCONTINUED | OUTPATIENT
Start: 2025-01-02 | End: 2025-01-04 | Stop reason: HOSPADM

## 2025-01-02 RX ORDER — POLYETHYLENE GLYCOL 3350 17 G/17G
17 POWDER, FOR SOLUTION ORAL DAILY PRN
Status: DISCONTINUED | OUTPATIENT
Start: 2025-01-02 | End: 2025-01-04 | Stop reason: HOSPADM

## 2025-01-02 RX ORDER — ENOXAPARIN SODIUM 100 MG/ML
40 INJECTION SUBCUTANEOUS DAILY
Status: DISCONTINUED | OUTPATIENT
Start: 2025-01-02 | End: 2025-01-04 | Stop reason: HOSPADM

## 2025-01-02 RX ORDER — SODIUM CHLORIDE 0.9 % (FLUSH) 0.9 %
5-40 SYRINGE (ML) INJECTION PRN
Status: DISCONTINUED | OUTPATIENT
Start: 2025-01-02 | End: 2025-01-04 | Stop reason: HOSPADM

## 2025-01-02 RX ORDER — LISINOPRIL 5 MG/1
2.5 TABLET ORAL DAILY
Status: DISCONTINUED | OUTPATIENT
Start: 2025-01-02 | End: 2025-01-04 | Stop reason: HOSPADM

## 2025-01-02 RX ORDER — SODIUM CHLORIDE 9 MG/ML
INJECTION, SOLUTION INTRAVENOUS PRN
Status: DISCONTINUED | OUTPATIENT
Start: 2025-01-02 | End: 2025-01-04 | Stop reason: HOSPADM

## 2025-01-02 RX ORDER — ACETAMINOPHEN 325 MG/1
650 TABLET ORAL EVERY 6 HOURS PRN
Status: DISCONTINUED | OUTPATIENT
Start: 2025-01-02 | End: 2025-01-04 | Stop reason: HOSPADM

## 2025-01-02 RX ORDER — HYDROXYZINE PAMOATE 25 MG/1
25 CAPSULE ORAL 3 TIMES DAILY PRN
Status: DISCONTINUED | OUTPATIENT
Start: 2025-01-02 | End: 2025-01-04 | Stop reason: HOSPADM

## 2025-01-02 RX ORDER — ONDANSETRON 2 MG/ML
4 INJECTION INTRAMUSCULAR; INTRAVENOUS EVERY 6 HOURS PRN
Status: DISCONTINUED | OUTPATIENT
Start: 2025-01-02 | End: 2025-01-04 | Stop reason: HOSPADM

## 2025-01-02 RX ORDER — MAGNESIUM SULFATE IN WATER 40 MG/ML
2000 INJECTION, SOLUTION INTRAVENOUS PRN
Status: DISCONTINUED | OUTPATIENT
Start: 2025-01-02 | End: 2025-01-04 | Stop reason: HOSPADM

## 2025-01-02 RX ORDER — POTASSIUM CHLORIDE 1500 MG/1
40 TABLET, EXTENDED RELEASE ORAL PRN
Status: DISCONTINUED | OUTPATIENT
Start: 2025-01-02 | End: 2025-01-04 | Stop reason: HOSPADM

## 2025-01-02 RX ORDER — ROSUVASTATIN CALCIUM 20 MG/1
20 TABLET, COATED ORAL DAILY
COMMUNITY

## 2025-01-02 RX ORDER — SODIUM CHLORIDE 0.9 % (FLUSH) 0.9 %
5-40 SYRINGE (ML) INJECTION EVERY 12 HOURS SCHEDULED
Status: DISCONTINUED | OUTPATIENT
Start: 2025-01-02 | End: 2025-01-04 | Stop reason: HOSPADM

## 2025-01-02 RX ORDER — POTASSIUM CHLORIDE 7.45 MG/ML
10 INJECTION INTRAVENOUS PRN
Status: DISCONTINUED | OUTPATIENT
Start: 2025-01-02 | End: 2025-01-04 | Stop reason: HOSPADM

## 2025-01-02 RX ORDER — ACETAMINOPHEN 650 MG/1
650 SUPPOSITORY RECTAL EVERY 6 HOURS PRN
Status: DISCONTINUED | OUTPATIENT
Start: 2025-01-02 | End: 2025-01-04 | Stop reason: HOSPADM

## 2025-01-02 RX ORDER — ONDANSETRON 4 MG/1
4 TABLET, ORALLY DISINTEGRATING ORAL EVERY 8 HOURS PRN
Status: DISCONTINUED | OUTPATIENT
Start: 2025-01-02 | End: 2025-01-04 | Stop reason: HOSPADM

## 2025-01-02 RX ORDER — INSULIN GLARGINE 100 [IU]/ML
15 INJECTION, SOLUTION SUBCUTANEOUS NIGHTLY
Status: DISCONTINUED | OUTPATIENT
Start: 2025-01-02 | End: 2025-01-04 | Stop reason: HOSPADM

## 2025-01-02 RX ORDER — HYDROCODONE BITARTRATE AND ACETAMINOPHEN 5; 325 MG/1; MG/1
1 TABLET ORAL EVERY 4 HOURS PRN
Status: DISCONTINUED | OUTPATIENT
Start: 2025-01-02 | End: 2025-01-04 | Stop reason: HOSPADM

## 2025-01-02 RX ORDER — INSULIN LISPRO 100 [IU]/ML
0-4 INJECTION, SOLUTION INTRAVENOUS; SUBCUTANEOUS
Status: DISCONTINUED | OUTPATIENT
Start: 2025-01-02 | End: 2025-01-04 | Stop reason: HOSPADM

## 2025-01-02 RX ADMIN — VANCOMYCIN HYDROCHLORIDE 2500 MG: 1 INJECTION, POWDER, LYOPHILIZED, FOR SOLUTION INTRAVENOUS at 10:38

## 2025-01-02 RX ADMIN — INSULIN GLARGINE 15 UNITS: 100 INJECTION, SOLUTION SUBCUTANEOUS at 21:33

## 2025-01-02 RX ADMIN — HYDROXYZINE PAMOATE 25 MG: 25 CAPSULE ORAL at 23:34

## 2025-01-02 RX ADMIN — ACETAMINOPHEN 650 MG: 325 TABLET ORAL at 18:05

## 2025-01-02 RX ADMIN — INSULIN LISPRO 1 UNITS: 100 INJECTION, SOLUTION INTRAVENOUS; SUBCUTANEOUS at 21:33

## 2025-01-02 RX ADMIN — SODIUM CHLORIDE, PRESERVATIVE FREE 10 ML: 5 INJECTION INTRAVENOUS at 21:32

## 2025-01-02 ASSESSMENT — PAIN SCALES - GENERAL
PAINLEVEL_OUTOF10: 0
PAINLEVEL_OUTOF10: 10
PAINLEVEL_OUTOF10: 10

## 2025-01-02 ASSESSMENT — LIFESTYLE VARIABLES
HOW MANY STANDARD DRINKS CONTAINING ALCOHOL DO YOU HAVE ON A TYPICAL DAY: PATIENT DOES NOT DRINK
HOW OFTEN DO YOU HAVE A DRINK CONTAINING ALCOHOL: NEVER
HOW MANY STANDARD DRINKS CONTAINING ALCOHOL DO YOU HAVE ON A TYPICAL DAY: PATIENT DOES NOT DRINK

## 2025-01-02 NOTE — ED TRIAGE NOTES
Pt had I&D on her R foot on October. Was placed on abx for osteomyelitis. Had fever of 103, took Tylenol this morning, afebrile at triage.

## 2025-01-02 NOTE — ED NOTES
Assumed care of patient at this time from Yandy WALSH. Pt is resting on stretcher with no complaints at this time

## 2025-01-02 NOTE — ED NOTES
ED TO INPATIENT SBAR HANDOFF    Patient Name: Sesar Echavarria   Preferred Name: Sesar  : 1970  54 y.o.   Family/Caregiver Present: no   Code Status Order: Prior  PO Status: NPO:No  Telemetry Order:   C-SSRS: Risk of Suicide: No Risk  Sitter no   Restraints:     Sepsis Risk Score      Situation  Chief Complaint   Patient presents with    Wound Infection     Brief Description of Patient's Condition: Pt had I&D on her R foot on October. Was placed on abx for osteomyelitis. Had fever of 103, took Tylenol this morning, afebrile upon arrival.  Mental Status: oriented, alert, coherent, logical, thought processes intact, and able to concentrate and follow conversation  Arrived from:Home  Imaging:   XR FOOT RIGHT (MIN 3 VIEWS)   Final Result   Charcot arthropathy in the midfoot. No definite evidence of osteomyelitis by   radiographs.      Electronically signed by FAM CHRISTIANSON        Abnormal labs:   Abnormal Labs Reviewed   COMPREHENSIVE METABOLIC PANEL - Abnormal; Notable for the following components:       Result Value    Glucose 163 (*)     AST 10 (*)     Globulin 4.1 (*)     Albumin/Globulin Ratio 0.9 (*)     All other components within normal limits   PROCALCITONIN - Abnormal; Notable for the following components:    Procalcitonin <0.05 (*)     All other components within normal limits       Background  Allergies:   Allergies   Allergen Reactions    Clindamycin     Doxycycline     Nirmatrelvir-Ritonavir Rash    Ritonavir Rash    Sulfa Antibiotics Rash    Sulfamethoxazole-Trimethoprim Rash     Other Reaction(s): ITCHING, NAUSEA, VOMITING     History:   Past Medical History:   Diagnosis Date    Anxiety     COVID     Diabetic ulcer of foot with fat layer exposed (HCC)     Hyperlipidemia     Hypertension     Neuropathy     Obesity     Type II or unspecified type diabetes mellitus without mention of complication, uncontrolled        Assessment  Vitals:        Vitals:    25 0845 25 0930 25 0948  01/02/25 1100   BP: (!) 163/87 (!) 144/86  137/86   Pulse: (!) 104   86   Resp: 18   16   Temp: 98.4 °F (36.9 °C)      TempSrc: Oral      SpO2: 100% 100%  100%   Weight: 97.5 kg (215 lb)  97.5 kg (215 lb)    Height: 1.626 m (5' 4\")  1.626 m (5' 4\")      Deterioration Index (DI):    Deterioration Index (DI) Interventions Performed:    O2 Flow Rate:    O2 Device: O2 Device: None (Room air)  Cardiac Rhythm:    Critical Lab Results: [unfilled]  Cultures: Cultures:Blood  NIH Score: NIH     Active LDA's:   Peripheral IV 01/02/25 Right Antecubital (Active)     Active Central Lines:                          Active Wounds:    Active Stallworth's:    Active Feeding Tubes:      Administered Medications:   Medications   vancomycin (VANCOCIN) 2,500 mg in sodium chloride 0.9 % 500 mL IVPB (2,500 mg IntraVENous New Bag 1/2/25 1038)     Last documented pain medication administration: n/a  Pertinent or High Risk Medications/Drips: no   If Yes, please provide details: n/a  Blood Product Administration: no  If Yes, please provide details: n/a  Process Protocols/Bundles: Sepsis Protocol/Bundle Completion-yes    Recommendation  Incomplete STAT orders: n/a  Overdue Medications: n/a  Patient Belongings:    Additional Comments: n/a  If any further questions, please call Sending RN at [phone]      Admitting Unit Notification  Name of person notified and time: Masha @ 6332      Electronically signed by: Electronically signed by Adriana Vasquez RN on 1/2/2025 at 12:00 PM

## 2025-01-02 NOTE — H&P
V2.0  History and Physical      Name:  Sesar Echavarria /Age/Sex: 1970  (54 y.o. female)   MRN & CSN:  [id number] & [id number] Encounter Date/Time: 2025 1:03 PM EST   Location:  [facility]/14 PCP: Arabella Moreira DO       Hospital Day: 1    Assessment and Plan:   Sesar Echavarria is a 54 y.o. female with a pmh of diabetes mellitus, right Charcot foot deformity, history of osteomyelitis in the right lower extremity, peripheral neuropathy, hyperlipidemia and hypertension who presents with fever and right foot pain.    Hospital Problems             Last Modified POA    * (Principal) Fever due to infection 2025 Yes    Charcot's joint of right foot 2025 Yes    Immunocompromised, acquired (HCC) 2025 Yes    Type 2 diabetes mellitus with hyperglycemia, with long-term current use of insulin ([facility]) 2025 Yes    Essential hypertension 2025 Yes         Principal Problem:    Fever due to infection  However I am not convinced that infection is in her right lower extremity.  Clinically there is no open wound or other evidence for infection such as cellulitis or osteomyelitis.  Chest x-ray and COVID/flu studies unremarkable.  Plan:   Fever may very well have been due to a passing acute illness such as viral infection  For now will not give any further antibiotics pending at least her surgeons evaluation    Active Problems:    Charcot's joint of right foot  Plan:   Chronic    Immunocompromised, acquired (HCC)  Plan:   Secondary to Dupixent therapy      Type 2 diabetes mellitus with hyperglycemia, with long-term current use of insulin ([facility])  Plan:   Continue her home diabetic regimen      Essential hypertension  Plan:   Continue home blood pressure medicines    Other:  If patient continues to do clinically well and no further fever consider discharge as soon as tomorrow with close outpatient observation       Admit to In-Patient    Disposition:   Current Living situation: Home  Expected Disposition:  yesterday/today were reviewed  [] All current labs were reviewed and interpreted for clinical significance   [] Appropriate follow-up labs were ordered  [] Collateral history obtained from:

## 2025-01-02 NOTE — ED PROVIDER NOTES
initiated sequentially as per orders.   Fluid resuscitation volume with the FULL 30ml/kg crystalloid bolus was: NOT INDICATED: the patient did NOT meet criteria for septic shock OR had persistent hypotension due to sepsis. 500ml of crystalloid was given instead..  I have performed a sepsis reassessment of the patient's clinical volume status and tissue perfusion at TIME: 1136am, and DATE: 1/2/25, and the patient is adequately resuscitated.    Consults:  IP CONSULT TO PODIATRY     Results     Labs:  Recent Results (from the past 12 hour(s))   POC Lactic Acid    Collection Time: 01/02/25  9:25 AM   Result Value Ref Range    POC Lactic Acid 0.87 0.40 - 2.00 mmol/L    Performed by: LUIS CHOWDARY    CBC with Auto Differential    Collection Time: 01/02/25  9:33 AM   Result Value Ref Range    WBC 10.1 3.6 - 11.0 K/uL    RBC 4.53 3.80 - 5.20 M/uL    Hemoglobin 13.4 11.5 - 16.0 g/dL    Hematocrit 40.2 35.0 - 47.0 %    MCV 88.7 80.0 - 99.0 FL    MCH 29.6 26.0 - 34.0 PG    MCHC 33.3 30.0 - 36.5 g/dL    RDW 12.7 11.5 - 14.5 %    Platelets 288 150 - 400 K/uL    MPV 9.4 8.9 - 12.9 FL    Nucleated RBCs 0.0 0.0  WBC    nRBC 0.00 0.00 - 0.01 K/uL    Neutrophils % 58 32 - 75 %    Lymphocytes % 34 12 - 49 %    Monocytes % 6 5 - 13 %    Eosinophils % 2 0 - 7 %    Basophils % 0 0 - 1 %    Immature Granulocytes % 0 0 - 0.5 %    Neutrophils Absolute 5.8 1.8 - 8.0 K/UL    Lymphocytes Absolute 3.5 0.8 - 3.5 K/UL    Monocytes Absolute 0.6 0.0 - 1.0 K/UL    Eosinophils Absolute 0.2 0.0 - 0.4 K/UL    Basophils Absolute 0.0 0.0 - 0.1 K/UL    Immature Granulocytes Absolute 0.0 0.00 - 0.04 K/UL    Differential Type AUTOMATED     Comprehensive Metabolic Panel    Collection Time: 01/02/25  9:33 AM   Result Value Ref Range    Sodium 136 136 - 145 mmol/L    Potassium 4.0 3.5 - 5.1 mmol/L    Chloride 104 97 - 108 mmol/L    CO2 27 21 - 32 mmol/L    Anion Gap 5 2 - 12 mmol/L    Glucose 163 (H) 65 - 100 mg/dL    BUN 14 6 - 20 mg/dL    Creatinine  0.83 0.55 - 1.02 mg/dL    BUN/Creatinine Ratio 17 12 - 20      Est, Glom Filt Rate 83 >60 ml/min/1.73m2    Calcium 9.8 8.5 - 10.1 mg/dL    Total Bilirubin 0.3 0.2 - 1.0 mg/dL    AST 10 (L) 15 - 37 U/L    ALT 13 12 - 78 U/L    Alk Phosphatase 105 45 - 117 U/L    Total Protein 7.8 6.4 - 8.2 g/dL    Albumin 3.7 3.5 - 5.0 g/dL    Globulin 4.1 (H) 2.0 - 4.0 g/dL    Albumin/Globulin Ratio 0.9 (L) 1.1 - 2.2     Procalcitonin    Collection Time: 01/02/25  9:33 AM   Result Value Ref Range    Procalcitonin <0.05 (H) 0 ng/mL   Troponin    Collection Time: 01/02/25  9:33 AM   Result Value Ref Range    Troponin, High Sensitivity <4 0 - 51 ng/L       Radiologic Studies:  Non-plain film images such as CT, Ultrasound and MRI are read by the radiologist. Plain radiographic images are visualized and preliminarily interpreted by the ED Provider with the below findings:    Interpretation per the Radiologist below, if available at the time of this note:  XR FOOT RIGHT (MIN 3 VIEWS)   Final Result   Charcot arthropathy in the midfoot. No definite evidence of osteomyelitis by   radiographs.      Electronically signed by FAM CHRISTIANSON           Diagnosis     Clinical Impression:   1. Foot infection        Disposition & Disposition Considerations     DISPOSITION Admitted 01/02/2025 11:36:17 AM               Admit Note: Pt is being admitted by hospitalist. The results of their tests and reason(s) for their admission have been discussed with pt and/or available family. They convey agreement and understanding for the need to be admitted and for the admission diagnosis.    Additional Disposition Considerations:      Smoking Cessation:Not Applicable    DISCHARGE PLAN:  1.   Current Discharge Medication List        CONTINUE these medications which have NOT CHANGED    Details   VIBRAMYCIN 100 MG capsule Take 1 capsule by mouth 2 times daily      Lancets MISC Check BG 3 times daily. Dx code E11.65  Qty: 300 each, Refills: 3    Associated Diagnoses:

## 2025-01-02 NOTE — CARE COORDINATION
01/02/25 1423   Service Assessment   Patient Orientation Alert and Oriented   Cognition Alert   History Provided By Patient   Primary Caregiver Family   Accompanied By/Relationship Daughter   Support Systems Children   Patient's Healthcare Decision Maker is: Legal Next of Kin   PCP Verified by CM Yes  (Arabella Harish)   Prior Functional Level Assistance with the following:   Current Functional Level Assistance with the following:   Can patient return to prior living arrangement Yes   Ability to make needs known: Good   Family able to assist with home care needs: Yes   Would you like for me to discuss the discharge plan with any other family members/significant others, and if so, who? Yes   Financial Resources None   Community Resources None   CM/SW Referral Other (see comment)  (None)   Social/Functional History   Lives With Daughter   Home Equipment Wheelchair - Manual;Cane;Walker - Rolling     Cm met with pt and daughter at the bedside.     CM verified home address.     Pt's daughter's provides personal care.     Pt would like to work with PT only. Cm messaged Dr. Steev regarding PT order.     Pharmacy - [facility]    Advance Care Planning   Healthcare Decision Maker:    Primary Decision Maker: Vivian Echavarria - Child - [phone]        [place] received a response back from Dr. Steve. MD prefers the surgeon to see pt to determine weight bearing status prior to ordering PT.

## 2025-01-03 LAB
BASOPHILS # BLD: 0 K/UL (ref 0–0.1)
BASOPHILS NFR BLD: 0 % (ref 0–1)
DIFFERENTIAL METHOD BLD: NORMAL
EOSINOPHIL # BLD: 0.2 K/UL (ref 0–0.4)
EOSINOPHIL NFR BLD: 3 % (ref 0–7)
ERYTHROCYTE [DISTWIDTH] IN BLOOD BY AUTOMATED COUNT: 13 % (ref 11.5–14.5)
GLUCOSE BLD STRIP.AUTO-MCNC: 130 MG/DL (ref 65–100)
GLUCOSE BLD STRIP.AUTO-MCNC: 131 MG/DL (ref 65–100)
GLUCOSE BLD STRIP.AUTO-MCNC: 147 MG/DL (ref 65–100)
GLUCOSE BLD STRIP.AUTO-MCNC: 158 MG/DL (ref 65–100)
HCT VFR BLD AUTO: 38.3 % (ref 35–47)
HGB BLD-MCNC: 12.4 G/DL (ref 11.5–16)
IMM GRANULOCYTES # BLD AUTO: 0 K/UL (ref 0–0.04)
IMM GRANULOCYTES NFR BLD AUTO: 0 % (ref 0–0.5)
LYMPHOCYTES # BLD: 3 K/UL (ref 0.8–3.5)
LYMPHOCYTES NFR BLD: 39 % (ref 12–49)
MCH RBC QN AUTO: 28.9 PG (ref 26–34)
MCHC RBC AUTO-ENTMCNC: 32.4 G/DL (ref 30–36.5)
MCV RBC AUTO: 89.3 FL (ref 80–99)
MONOCYTES # BLD: 0.6 K/UL (ref 0–1)
MONOCYTES NFR BLD: 8 % (ref 5–13)
NEUTS SEG # BLD: 3.8 K/UL (ref 1.8–8)
NEUTS SEG NFR BLD: 50 % (ref 32–75)
NRBC # BLD: 0 K/UL (ref 0–0.01)
NRBC BLD-RTO: 0 PER 100 WBC
PERFORMED BY:: ABNORMAL
PLATELET # BLD AUTO: 255 K/UL (ref 150–400)
PMV BLD AUTO: 9.8 FL (ref 8.9–12.9)
RBC # BLD AUTO: 4.29 M/UL (ref 3.8–5.2)
WBC # BLD AUTO: 7.7 K/UL (ref 3.6–11)

## 2025-01-03 PROCEDURE — 36415 COLL VENOUS BLD VENIPUNCTURE: CPT

## 2025-01-03 PROCEDURE — G0378 HOSPITAL OBSERVATION PER HR: HCPCS

## 2025-01-03 PROCEDURE — 6370000000 HC RX 637 (ALT 250 FOR IP): Performed by: STUDENT IN AN ORGANIZED HEALTH CARE EDUCATION/TRAINING PROGRAM

## 2025-01-03 PROCEDURE — 6370000000 HC RX 637 (ALT 250 FOR IP): Performed by: INTERNAL MEDICINE

## 2025-01-03 PROCEDURE — 82962 GLUCOSE BLOOD TEST: CPT

## 2025-01-03 PROCEDURE — 2500000003 HC RX 250 WO HCPCS: Performed by: INTERNAL MEDICINE

## 2025-01-03 PROCEDURE — 85025 COMPLETE CBC W/AUTO DIFF WBC: CPT

## 2025-01-03 PROCEDURE — 1100000000 HC RM PRIVATE

## 2025-01-03 RX ADMIN — SODIUM CHLORIDE, PRESERVATIVE FREE 10 ML: 5 INJECTION INTRAVENOUS at 09:52

## 2025-01-03 RX ADMIN — ACETAMINOPHEN 650 MG: 325 TABLET ORAL at 21:05

## 2025-01-03 RX ADMIN — HYDROXYZINE PAMOATE 25 MG: 25 CAPSULE ORAL at 21:05

## 2025-01-03 ASSESSMENT — PAIN SCALES - GENERAL
PAINLEVEL_OUTOF10: 10
PAINLEVEL_OUTOF10: 2
PAINLEVEL_OUTOF10: 0

## 2025-01-03 ASSESSMENT — PAIN SCALES - WONG BAKER: WONGBAKER_NUMERICALRESPONSE: HURTS A LITTLE BIT

## 2025-01-03 ASSESSMENT — PAIN DESCRIPTION - DESCRIPTORS: DESCRIPTORS: ACHING

## 2025-01-03 ASSESSMENT — PAIN DESCRIPTION - LOCATION: LOCATION: FOOT

## 2025-01-03 ASSESSMENT — ENCOUNTER SYMPTOMS
NAUSEA: 0
DIARRHEA: 0
ABDOMINAL PAIN: 0
CHEST TIGHTNESS: 0
VOMITING: 0
COUGH: 0
RHINORRHEA: 0
SORE THROAT: 0
SHORTNESS OF BREATH: 0
RESPIRATORY NEGATIVE: 1

## 2025-01-03 ASSESSMENT — PAIN DESCRIPTION - ORIENTATION: ORIENTATION: RIGHT

## 2025-01-03 NOTE — PROGRESS NOTES
PIV removed at this time per pt request, pt stated discomfort to IV site. Provider Shantell notified

## 2025-01-03 NOTE — PLAN OF CARE
Problem: Pain  Goal: Verbalizes/displays adequate comfort level or baseline comfort level  1/3/2025 1019 by Neftaly Blair RN  Outcome: Progressing  1/3/2025 0015 by Marcelo Salinas RN  Outcome: Progressing     Problem: Chronic Conditions and Co-morbidities  Goal: Patient's chronic conditions and co-morbidity symptoms are monitored and maintained or improved  1/3/2025 1019 by Neftaly Blair RN  Outcome: Progressing  1/3/2025 0015 by Marcelo Salinas RN  Outcome: Progressing  Flowsheets (Taken 1/2/2025 2000)  Care Plan - Patient's Chronic Conditions and Co-Morbidity Symptoms are Monitored and Maintained or Improved: Monitor and assess patient's chronic conditions and comorbid symptoms for stability, deterioration, or improvement     Problem: Discharge Planning  Goal: Discharge to home or other facility with appropriate resources  1/3/2025 1019 by Neftaly Blair RN  Outcome: Progressing  1/3/2025 0015 by Marcelo Salinas RN  Outcome: Progressing  Flowsheets (Taken 1/2/2025 2000)  Discharge to home or other facility with appropriate resources: Identify barriers to discharge with patient and caregiver     Problem: Safety - Adult  Goal: Free from fall injury  1/3/2025 1019 by Neftaly Blair RN  Outcome: Progressing  1/3/2025 0015 by Marcelo Salinas RN  Outcome: Progressing     Problem: Skin/Tissue Integrity  Goal: Absence of new skin breakdown  Description: 1.  Monitor for areas of redness and/or skin breakdown  2.  Assess vascular access sites hourly  3.  Every 4-6 hours minimum:  Change oxygen saturation probe site  4.  Every 4-6 hours:  If on nasal continuous positive airway pressure, respiratory therapy assess nares and determine need for appliance change or resting period.  Outcome: Progressing

## 2025-01-03 NOTE — PLAN OF CARE
Problem: Pain  Goal: Verbalizes/displays adequate comfort level or baseline comfort level  1/3/2025 0015 by Marcelo Salinas RN  Outcome: Progressing  1/2/2025 1510 by Emilia White RN  Outcome: Progressing     Problem: Chronic Conditions and Co-morbidities  Goal: Patient's chronic conditions and co-morbidity symptoms are monitored and maintained or improved  1/3/2025 0015 by Marcelo Salinas RN  Outcome: Progressing  1/2/2025 1510 by Emilia White RN  Outcome: Progressing     Problem: Discharge Planning  Goal: Discharge to home or other facility with appropriate resources  1/3/2025 0015 by Marcelo Salinas RN  Outcome: Progressing  1/2/2025 1510 by Emilia White RN  Outcome: Progressing     Problem: Safety - Adult  Goal: Free from fall injury  1/3/2025 0015 by Marcelo Salinas RN  Outcome: Progressing  1/2/2025 1510 by Emilia White RN  Outcome: Progressing     Problem: Skin/Tissue Integrity  Goal: Absence of new skin breakdown  Description: 1.  Monitor for areas of redness and/or skin breakdown  2.  Assess vascular access sites hourly  3.  Every 4-6 hours minimum:  Change oxygen saturation probe site  4.  Every 4-6 hours:  If on nasal continuous positive airway pressure, respiratory therapy assess nares and determine need for appliance change or resting period.  1/2/2025 1510 by Emilia White RN  Outcome: Progressing

## 2025-01-04 VITALS
OXYGEN SATURATION: 100 % | SYSTOLIC BLOOD PRESSURE: 147 MMHG | WEIGHT: 215 LBS | TEMPERATURE: 98.1 F | HEART RATE: 90 BPM | RESPIRATION RATE: 18 BRPM | HEIGHT: 64 IN | DIASTOLIC BLOOD PRESSURE: 88 MMHG | BODY MASS INDEX: 36.7 KG/M2

## 2025-01-04 LAB
ACCESSION NUMBER, LLC1M: ABNORMAL
ACINETOBACTER CALCOAC BAUMANNII COMPLEX BY PCR: NOT DETECTED
ANION GAP SERPL CALC-SCNC: 5 MMOL/L (ref 2–12)
BACTEROIDES FRAGILIS BY PCR: NOT DETECTED
BASOPHILS # BLD: 0 K/UL (ref 0–0.1)
BASOPHILS NFR BLD: 0 % (ref 0–1)
BIOFIRE TEST COMMENT: ABNORMAL
BUN SERPL-MCNC: 17 MG/DL (ref 6–20)
BUN/CREAT SERPL: 24 (ref 12–20)
CA-I BLD-MCNC: 8.9 MG/DL (ref 8.5–10.1)
CANDIDA ALBICANS BY PCR: NOT DETECTED
CANDIDA AURIS BY PCR: NOT DETECTED
CANDIDA GLABRATA: NOT DETECTED
CANDIDA KRUSEI BY PCR: NOT DETECTED
CANDIDA PARAPSILOSIS BY PCR: NOT DETECTED
CANDIDA TROPICALIS BY PCR: NOT DETECTED
CHLORIDE SERPL-SCNC: 110 MMOL/L (ref 97–108)
CO2 SERPL-SCNC: 24 MMOL/L (ref 21–32)
CREAT SERPL-MCNC: 0.72 MG/DL (ref 0.55–1.02)
CRP SERPL-MCNC: 1.16 MG/DL (ref 0–0.3)
CRYPTOCOCCUS NEOFORMANS/GATTII BY PCR: NOT DETECTED
DIFFERENTIAL METHOD BLD: NORMAL
ENTEROBACTER CLOACAE COMPLEX BY PCR: NOT DETECTED
ENTEROBACTERALES BY PCR: NOT DETECTED
ENTEROCOCCUS FAECALIS BY PCR: NOT DETECTED
ENTEROCOCCUS FAECIUM BY PCR: NOT DETECTED
EOSINOPHIL # BLD: 0.3 K/UL (ref 0–0.4)
EOSINOPHIL NFR BLD: 3 % (ref 0–7)
ERYTHROCYTE [DISTWIDTH] IN BLOOD BY AUTOMATED COUNT: 12.6 % (ref 11.5–14.5)
ESCHERICHIA COLI: NOT DETECTED
GLUCOSE BLD STRIP.AUTO-MCNC: 136 MG/DL (ref 65–100)
GLUCOSE BLD STRIP.AUTO-MCNC: 170 MG/DL (ref 65–100)
GLUCOSE SERPL-MCNC: 140 MG/DL (ref 65–100)
HAEMOPHILUS INFLUENZAE BY PCR: NOT DETECTED
HCT VFR BLD AUTO: 35.6 % (ref 35–47)
HGB BLD-MCNC: 11.8 G/DL (ref 11.5–16)
IMM GRANULOCYTES # BLD AUTO: 0 K/UL (ref 0–0.04)
IMM GRANULOCYTES NFR BLD AUTO: 0 % (ref 0–0.5)
KLEBSIELLA AEROGENES BY PCR: NOT DETECTED
KLEBSIELLA OXYTOCA BY PCR: NOT DETECTED
KLEBSIELLA PNEUMONIAE GROUP BY PCR: NOT DETECTED
LISTERIA MONOCYTOGENES BY PCR: NOT DETECTED
LYMPHOCYTES # BLD: 3.5 K/UL (ref 0.8–3.5)
LYMPHOCYTES NFR BLD: 38 % (ref 12–49)
MCH RBC QN AUTO: 29.4 PG (ref 26–34)
MCHC RBC AUTO-ENTMCNC: 33.1 G/DL (ref 30–36.5)
MCV RBC AUTO: 88.8 FL (ref 80–99)
MONOCYTES # BLD: 0.7 K/UL (ref 0–1)
MONOCYTES NFR BLD: 7 % (ref 5–13)
NEISSERIA MENINGITIDIS BY PCR: NOT DETECTED
NEUTS SEG # BLD: 4.6 K/UL (ref 1.8–8)
NEUTS SEG NFR BLD: 52 % (ref 32–75)
NRBC # BLD: 0 K/UL (ref 0–0.01)
NRBC BLD-RTO: 0 PER 100 WBC
PERFORMED BY:: ABNORMAL
PERFORMED BY:: ABNORMAL
PLATELET # BLD AUTO: 264 K/UL (ref 150–400)
PMV BLD AUTO: 9.7 FL (ref 8.9–12.9)
POTASSIUM SERPL-SCNC: 3.9 MMOL/L (ref 3.5–5.1)
PROTEUS BY PCR: NOT DETECTED
PSEUDOMONAS AERUGINOSA, PSAEP: NOT DETECTED
RBC # BLD AUTO: 4.01 M/UL (ref 3.8–5.2)
RESISTANT GENE TARGETS: ABNORMAL
SALMONELLA SPECIES BY PCR: NOT DETECTED
SERRATIA MARCESCENS BY PCR: NOT DETECTED
SODIUM SERPL-SCNC: 139 MMOL/L (ref 136–145)
STAPHYLOCOCCUS AUREUS: NOT DETECTED
STAPHYLOCOCCUS EPIDERMIDIS BY PCR: NOT DETECTED
STAPHYLOCOCCUS LUGDUNENSIS BY PCR: NOT DETECTED
STAPHYLOCOCCUS: NOT DETECTED
STENOTROPHOMONAS MALTOPHILIA BY PCR: NOT DETECTED
STREPTOCOCCUS AGALACTIAE (GROUP B): NOT DETECTED
STREPTOCOCCUS PNEUMONIAE , SPNP: NOT DETECTED
STREPTOCOCCUS PYOGENES (GROUP A), SPYOP: NOT DETECTED
STREPTOCOCCUS: DETECTED
WBC # BLD AUTO: 9.2 K/UL (ref 3.6–11)

## 2025-01-04 PROCEDURE — 85025 COMPLETE CBC W/AUTO DIFF WBC: CPT

## 2025-01-04 PROCEDURE — G0378 HOSPITAL OBSERVATION PER HR: HCPCS

## 2025-01-04 PROCEDURE — 80048 BASIC METABOLIC PNL TOTAL CA: CPT

## 2025-01-04 PROCEDURE — 82962 GLUCOSE BLOOD TEST: CPT

## 2025-01-04 PROCEDURE — 86140 C-REACTIVE PROTEIN: CPT

## 2025-01-04 PROCEDURE — 36415 COLL VENOUS BLD VENIPUNCTURE: CPT

## 2025-01-04 ASSESSMENT — ENCOUNTER SYMPTOMS
VOMITING: 0
DIARRHEA: 0
ABDOMINAL PAIN: 0
SHORTNESS OF BREATH: 0

## 2025-01-04 NOTE — CONSULTS
Podiatry Consult Note    Subjective:         Date of Consultation: January 4, 2025     Referring Physician: Silviano Barba MD     Patient is a 54 y.o.  female who is being seen for right foot Charcot.   Patient has a hx of diabetes mellitus, right Charcot foot deformity, history of osteomyelitis in the right lower extremity, peripheral neuropathy, hyperlipidemia and hypertension.  Patient had a fever into the right foot decided to come to emergency department for higher level care.  Patient is a patient of Dr. Moreno in which she has performed various surgeries to her foot.    Patient Active Problem List    Diagnosis Date Noted    Fever due to infection 01/02/2025    Immunocompromised, acquired (HCC) 01/02/2025    Otic barotrauma 09/11/2024    Iron deficiency 09/06/2024    Diabetic ulcer of right midfoot associated with type 2 diabetes mellitus, with fat layer exposed (HCC) 08/14/2024    Acute osteomyelitis of right foot 08/14/2024    Essential hypertension 08/14/2024    Functional urinary incontinence 06/05/2024    Charcot's joint of right foot 09/10/2023    Diabetic Charcot foot (HCC) 08/17/2023    Insomnia 08/17/2023    Class 1 obesity due to excess calories with serious comorbidity and body mass index (BMI) of 34.0 to 34.9 in adult 08/17/2023    Type 2 diabetes mellitus with hyperglycemia, with long-term current use of insulin (HCC) 12/23/2016    Hyperlipidemia 08/04/2014     Past Medical History:   Diagnosis Date    Anxiety     Atopic dermatitis     COVID 2023    Diabetic ulcer of foot with fat layer exposed ([facility])     Hyperlipidemia     Hypertension     Neuropathy     Obesity     Osteomyelitis     right foot    Type II or unspecified type diabetes mellitus without mention of complication, uncontrolled       Past Surgical History:   Procedure Laterality Date    ACHILLES TENDON SURGERY Right 10/10/2024    RIGHT TENDOACHILLES LENGTHENING AND EXOSECTOMY PLANTAR EXOSTOSIS performed by Ashutosh Moreno  devitalized soft tissue along the plantar aspect of the midfoot with suggestion of a sinus tract extending to the superficial skin. Area measures 3.2 cm in transverse dimension extending to the underlying cortex resulting in cortical erosion and irregularity along the plantar aspect of the cuboid. AP dimension not well assessed given extension more posterior to the field-of-view, but at least 3.6 cm.    Diffuse edema throughout the midfoot.    IMPRESSION(S):    1.  Findings compatible with Charcot/neuropathic arthropathy with associated cortical destruction and signal abnormality along the plantar aspect of the cuboid consistent with acute osteomyelitis.  2.  Diffuse intramuscular signal abnormality concerning for infectious/inflammatory myositis with small focal area of central nonenhancement in the plantar musculature, which may represent myonecrosis versus small developing abscess.  3.  Additional large area of plantar subcutaneous soft tissues devitalization along the midfoot containing a sinus tract to the skin surface.    Dictated By: Juan Cardoso 7/24/2024 10:28 PM    A IP Potentially Concerning message has been communicated to JODIE GAGE via the [employer] Critical Results application on 7/25/2024 9:20 AM, Message ID [id number].    I have reviewed the images and dictated, reviewed or edited the final report.    Dictated By: Juan Cardoso  Electronically Verified by: BLAKE GALE MD 7/25/2024 9:20 AM       XR Results (most recent):  XR CHEST PORTABLE 01/02/2025    Narrative  EXAM:  XR CHEST PORTABLE    INDICATION: fever    COMPARISON: Chest radiograph 8/19/2024    TECHNIQUE: Upright portable chest AP view    FINDINGS: The cardiac silhouette is within normal limits. The pulmonary  vasculature is within normal limits.    The lungs and pleural spaces are clear. The visualized bones and upper abdomen  are age-appropriate.    Impression  No acute process on portable chest.      Electronically signed by Trena

## 2025-01-04 NOTE — DISCHARGE SUMMARY
Discharge Summary    Name: Sesar Echavarria  [id number]  YOB: 1970 (Age: 54 y.o.)   Date of Admission: 1/2/2025  Date of Discharge: 1/4/2025  Attending Physician: No att. providers found    Discharge Diagnosis:   Principal Problem:    Fever due to infection  Active Problems:    Type 2 diabetes mellitus with hyperglycemia, with long-term current use of insulin (HCC)    Charcot's joint of right foot    Essential hypertension    Immunocompromised, acquired (HCC)  Resolved Problems:    * No resolved hospital problems. *       Consultations:  IP CONSULT TO ORTHOPEDIC SURGERY  IP CONSULT TO PODIATRY      Brief Admission History/Reason for Admission Per Raji Steve MD:   Fever, foot ulcer secondary to diabetes    Brief Hospital Course by Main Problems:    Sesar Echavarria is a 54 y.o. female with a pmh of diabetes mellitus, right Charcot foot deformity, history of osteomyelitis in the right lower extremity, peripheral neuropathy, hyperlipidemia and hypertension who was admitted 1/2/2025 with fever and right foot pain.  X-ray foot unremarkable.  Patient received vancomycin in ED.  WBC and procalcitonin WNL.  COVID, flu negative.  CXR negative for acute processes.  Blood cultures initially positive for 1 of 2 bottles with Streptococcus species growing.  Repeat culture shows no growth to date.  Podiatry consulted, no evidence of osteomyelitis on x-ray.  Degenerative changes noted to midfoot.  Patient does have some abrasions on her toe on her right foot, patient is getting new shoes on Monday that hopefully will help with this.  Otherwise patient should follow-up outpatient in office in 2 weeks.  Will discharge at this time have patient follow-up with podiatry in 2 weeks.    Discharge Exam:  Patient seen and examined by me on discharge day.  Patient resting comfortably, anxious to leave.  Patient denies any acute complaints.  Patient states she does not have any fever,  MG/0.5ML Sopn  Generic drug: Dulaglutide  Inject 4.5 mg into the skin once a week E11.65     TURMERIC PO     TYLENOL PO     Vibramycin 100 MG capsule  Generic drug: doxycycline hyclate     VITAMIN A PO     vitamin B-12 1000 MCG tablet  Commonly known as: CYANOCOBALAMIN     vitamin D3 125 MCG (5000 UT) Tabs tablet  Commonly known as: CHOLECALCIFEROL     VITAMIN E PO     VITAMIN K PO                  DISPOSITION:    Home with Family: x      Home with HH/PT/OT/RN:    SNF/LTC:    USHA:    OTHER:            Code status: full  Recommended diet: diabetic diet  Recommended activity: activity as tolerated  Wound care: None      Follow up with:   PCP : Arabella Moreira DO Mihelic, Jennifer J, DO  [address]  [place] [id number] [phone]    Follow up in 1 week(s)  Follow-up after hospitalization for management of medications and other comorbidities    Ashutosh Moreno MD  [address]  [facility] [address]  [place] [address] [phone]    Follow up in 1 week(s)  Follow-up after hospitalization for Charcot foot    Brian Milligan, CRISTAL  [address] [facility] [id number] [phone]    Follow up in 2 week(s)  Follow-up after hospitalization for continued management of foot ulcers          Total time in minutes spent coordinating this discharge (includes going over instructions, follow-up, prescriptions, and preparing report for sign off to her PCP) :  35 minutes

## 2025-01-04 NOTE — PROGRESS NOTES
Hospitalist Progress Note    NAME:   Sesar Echavarria   : 1970   MRN: [id number]     Date/Time: 1/3/2025 10:21 PM  Patient PCP: Arabella Moreira DO    Estimated discharge date: 24-48-hours  Barriers: Blood culture results, Ortho and podiatry recs    Hospital course: Sesar Echavarria is a 54 y.o. female with a pmh of diabetes mellitus, right Charcot foot deformity, history of osteomyelitis in the right lower extremity, peripheral neuropathy, hyperlipidemia and hypertension who presents with fever and right foot pain.     Patient previously had an open wound which led to osteomyelitis in the right lower extremity.  At 1 point there was concern that she might need amputation but she responded well to a combination of antibiotics and hyperbaric treatment.  She states that her diabetes is typically well-controlled.  She has had 3 days of fever as high as 103.4 °F.  She also complains of pain in her right foot.  She was advised to come to the emergency room by her surgeon Dr. Moreno.  Evaluation in the emergency room showed a normal white blood cell count and procalcitonin level.  Patient is not febrile here.  Vancomycin was administered by ER provider for possible foot infection.  X-ray of the foot unremarkable.    Assessment / Plan:  Subjective fevers  -Afebrile in ED  -No leukocytosis, Pro-Pj not elevated  -COVID and flu negative  -Chest x-ray negative for acute processes  -Continue to monitor vital signs daily labs  -Blood culture sent    Right foot Charcot deformity, acute pain  -Ortho and podiatry consults  -X-ray right foot negative for acute osteomyelitis    Type 2 diabetes  -Continue home glargine with lispro sliding scale    Hypertension  -Continue home lisinopril    Medical Decision Making:   [x] High (any 2)    A. Problems (any 1)  [x] Acute/Chronic Illness/injury posing threat to life or bodily function:    [] Severe exacerbation of chronic illness:

## 2025-01-04 NOTE — PLAN OF CARE
Problem: Pain  Goal: Verbalizes/displays adequate comfort level or baseline comfort level  1/3/2025 2342 by Marcelo Salinas RN  Outcome: Progressing  1/3/2025 1019 by Neftaly Blair RN  Outcome: Progressing     Problem: Chronic Conditions and Co-morbidities  Goal: Patient's chronic conditions and co-morbidity symptoms are monitored and maintained or improved  1/3/2025 2342 by Marcelo Salinas RN  Outcome: Progressing  1/3/2025 1019 by Neftaly Blair RN  Outcome: Progressing     Problem: Discharge Planning  Goal: Discharge to home or other facility with appropriate resources  1/3/2025 2342 by Marcelo Salinas RN  Outcome: Progressing  1/3/2025 1019 by Neftaly Blair RN  Outcome: Progressing     Problem: Safety - Adult  Goal: Free from fall injury  1/3/2025 2342 by Marcelo Salinas RN  Outcome: Progressing  1/3/2025 1019 by Neftaly Blair RN  Outcome: Progressing     Problem: Skin/Tissue Integrity  Goal: Absence of new skin breakdown  Description: 1.  Monitor for areas of redness and/or skin breakdown  2.  Assess vascular access sites hourly  3.  Every 4-6 hours minimum:  Change oxygen saturation probe site  4.  Every 4-6 hours:  If on nasal continuous positive airway pressure, respiratory therapy assess nares and determine need for appliance change or resting period.  1/3/2025 2342 by Marcelo Salinas, RN  Outcome: Progressing  1/3/2025 1019 by Neftaly Blair, RN  Outcome: Progressing

## 2025-01-04 NOTE — PLAN OF CARE
Problem: Pain  Goal: Verbalizes/displays adequate comfort level or baseline comfort level  1/4/2025 0928 by Neftaly Blair RN  Outcome: Progressing  1/3/2025 2342 by Marcelo Salinas RN  Outcome: Progressing     Problem: Chronic Conditions and Co-morbidities  Goal: Patient's chronic conditions and co-morbidity symptoms are monitored and maintained or improved  1/4/2025 0928 by Neftaly Blair RN  Outcome: Progressing  1/3/2025 2342 by Marcelo Salinas RN  Outcome: Progressing     Problem: Discharge Planning  Goal: Discharge to home or other facility with appropriate resources  1/4/2025 0928 by Neftaly Blair RN  Outcome: Progressing  1/3/2025 2342 by Marcelo Salinas RN  Outcome: Progressing     Problem: Safety - Adult  Goal: Free from fall injury  1/4/2025 0928 by Neftaly Blair RN  Outcome: Progressing  1/3/2025 2342 by Marcelo Salinas RN  Outcome: Progressing  Flowsheets (Taken 1/3/2025 2105)  Free From Fall Injury: Instruct family/caregiver on patient safety     Problem: Skin/Tissue Integrity  Goal: Absence of new skin breakdown  Description: 1.  Monitor for areas of redness and/or skin breakdown  2.  Assess vascular access sites hourly  3.  Every 4-6 hours minimum:  Change oxygen saturation probe site  4.  Every 4-6 hours:  If on nasal continuous positive airway pressure, respiratory therapy assess nares and determine need for appliance change or resting period.  1/4/2025 0928 by Neftaly Blair RN  Outcome: Progressing  1/3/2025 2342 by Marcelo Salinas RN  Outcome: Progressing

## 2025-01-05 LAB
BACTERIA SPEC CULT: ABNORMAL
BACTERIA SPEC CULT: ABNORMAL
BACTERIA SPEC CULT: NORMAL
BACTERIA SPEC CULT: NORMAL
Lab: ABNORMAL
Lab: NORMAL
Lab: NORMAL

## 2025-01-06 ENCOUNTER — TELEPHONE (OUTPATIENT)
Age: 55
End: 2025-01-06

## 2025-01-08 LAB
BACTERIA SPEC CULT: NORMAL
BACTERIA SPEC CULT: NORMAL
Lab: NORMAL
Lab: NORMAL

## 2025-01-15 ENCOUNTER — TELEMEDICINE (OUTPATIENT)
Age: 55
End: 2025-01-15

## 2025-01-15 ENCOUNTER — HOSPITAL ENCOUNTER (OUTPATIENT)
Facility: HOSPITAL | Age: 55
Discharge: HOME | End: 2025-01-15
Attending: FAMILY MEDICINE
Payer: MEDICAID

## 2025-01-15 VITALS
TEMPERATURE: 97.2 F | DIASTOLIC BLOOD PRESSURE: 80 MMHG | SYSTOLIC BLOOD PRESSURE: 144 MMHG | HEART RATE: 97 BPM | RESPIRATION RATE: 16 BRPM

## 2025-01-15 DIAGNOSIS — L97.511 CHRONIC ULCER OF GREAT TOE OF RIGHT FOOT, LIMITED TO BREAKDOWN OF SKIN (HCC): ICD-10-CM

## 2025-01-15 DIAGNOSIS — Z79.4 TYPE 2 DIABETES MELLITUS WITH HYPERGLYCEMIA, WITH LONG-TERM CURRENT USE OF INSULIN (HCC): Primary | ICD-10-CM

## 2025-01-15 DIAGNOSIS — I10 ESSENTIAL HYPERTENSION: ICD-10-CM

## 2025-01-15 DIAGNOSIS — E11.65 TYPE 2 DIABETES MELLITUS WITH HYPERGLYCEMIA, WITH LONG-TERM CURRENT USE OF INSULIN (HCC): Primary | ICD-10-CM

## 2025-01-15 DIAGNOSIS — L97.512 RIGHT SECOND TOE ULCER, WITH FAT LAYER EXPOSED (HCC): Primary | ICD-10-CM

## 2025-01-15 DIAGNOSIS — E78.2 MIXED HYPERLIPIDEMIA: ICD-10-CM

## 2025-01-15 PROCEDURE — 99213 OFFICE O/P EST LOW 20 MIN: CPT

## 2025-01-15 PROCEDURE — 11042 DBRDMT SUBQ TIS 1ST 20SQCM/<: CPT

## 2025-01-15 RX ORDER — MUPIROCIN 20 MG/G
OINTMENT TOPICAL ONCE
Status: CANCELLED | OUTPATIENT
Start: 2025-01-15 | End: 2025-01-15

## 2025-01-15 RX ORDER — CLOBETASOL PROPIONATE 0.5 MG/G
OINTMENT TOPICAL ONCE
Status: CANCELLED | OUTPATIENT
Start: 2025-01-15 | End: 2025-01-15

## 2025-01-15 RX ORDER — LIDOCAINE HYDROCHLORIDE 20 MG/ML
JELLY TOPICAL ONCE
Status: CANCELLED | OUTPATIENT
Start: 2025-01-15 | End: 2025-01-15

## 2025-01-15 RX ORDER — LIDOCAINE 40 MG/G
CREAM TOPICAL ONCE
Status: CANCELLED | OUTPATIENT
Start: 2025-01-15 | End: 2025-01-15

## 2025-01-15 RX ORDER — MUPIROCIN 20 MG/G
OINTMENT TOPICAL
COMMUNITY

## 2025-01-15 RX ORDER — TRAMADOL HYDROCHLORIDE 50 MG/1
TABLET ORAL
COMMUNITY
Start: 2024-10-11

## 2025-01-15 RX ORDER — NEOMYCIN/BACITRACIN/POLYMYXINB 3.5-400-5K
OINTMENT (GRAM) TOPICAL ONCE
Status: CANCELLED | OUTPATIENT
Start: 2025-01-15 | End: 2025-01-15

## 2025-01-15 RX ORDER — LIDOCAINE HYDROCHLORIDE 40 MG/ML
SOLUTION TOPICAL ONCE
Status: CANCELLED | OUTPATIENT
Start: 2025-01-15 | End: 2025-01-15

## 2025-01-15 RX ORDER — SODIUM CHLOR/HYPOCHLOROUS ACID 0.033 %
SOLUTION, IRRIGATION IRRIGATION ONCE
Status: CANCELLED | OUTPATIENT
Start: 2025-01-15 | End: 2025-01-15

## 2025-01-15 RX ORDER — GINSENG 100 MG
CAPSULE ORAL ONCE
Status: CANCELLED | OUTPATIENT
Start: 2025-01-15 | End: 2025-01-15

## 2025-01-15 RX ORDER — LIDOCAINE 50 MG/G
OINTMENT TOPICAL ONCE
Status: CANCELLED | OUTPATIENT
Start: 2025-01-15 | End: 2025-01-15

## 2025-01-15 RX ORDER — CLOBETASOL PROPIONATE 0.5 MG/G
OINTMENT TOPICAL
COMMUNITY
Start: 2025-01-04

## 2025-01-15 RX ORDER — TRIAMCINOLONE ACETONIDE 1 MG/G
OINTMENT TOPICAL ONCE
Status: CANCELLED | OUTPATIENT
Start: 2025-01-15 | End: 2025-01-15

## 2025-01-15 RX ORDER — BETAMETHASONE DIPROPIONATE 0.5 MG/G
CREAM TOPICAL ONCE
Status: CANCELLED | OUTPATIENT
Start: 2025-01-15 | End: 2025-01-15

## 2025-01-15 RX ORDER — GENTAMICIN SULFATE 1 MG/G
OINTMENT TOPICAL ONCE
Status: CANCELLED | OUTPATIENT
Start: 2025-01-15 | End: 2025-01-15

## 2025-01-15 RX ORDER — BACITRACIN ZINC AND POLYMYXIN B SULFATE 500; 1000 [USP'U]/G; [USP'U]/G
OINTMENT TOPICAL ONCE
Status: CANCELLED | OUTPATIENT
Start: 2025-01-15 | End: 2025-01-15

## 2025-01-15 RX ORDER — CALCIUM CITRATE/VITAMIN D3 200MG-6.25
TABLET ORAL
COMMUNITY
Start: 2025-01-09

## 2025-01-15 NOTE — FLOWSHEET NOTE
01/15/25 1355   Wound 01/15/25 Toe (Comment  which one) Right;Anterior 1st toe   Date First Assessed/Time First Assessed: 01/15/25 1312   Present on Original Admission: Yes  Wound Approximate Age at First Assessment (Weeks): 4 weeks  Location: Toe (Comment  which one)  Wound Location Orientation: Right;Anterior  Wound Description ...   Dressing Status New dressing applied   Wound Cleansed Betadine/povidone iodine   Dressing/Treatment Gauze dressing/dressing sponge;Roll gauze;Tape/Soft cloth adhesive tape   Offloading for Diabetic Foot Ulcers Offloading ordered   Wound 01/15/25 Toe (Comment  which one) Right 2nd toe   Date First Assessed/Time First Assessed: 01/15/25 1313   Present on Original Admission: Yes  Wound Approximate Age at First Assessment (Weeks): 4 weeks  Location: Toe (Comment  which one)  Wound Location Orientation: Right  Wound Description (Comments...   Dressing Status New dressing applied   Wound Cleansed Betadine/povidone iodine   Dressing/Treatment Gauze dressing/dressing sponge;Roll gauze;Tape/Soft cloth adhesive tape   Offloading for Diabetic Foot Ulcers Offloading ordered

## 2025-01-15 NOTE — PROGRESS NOTES
Chief Complaint   Patient presents with    Follow-up    Diabetes     Type 2           There were no vitals filed for this visit.   Due to this being a VV      1. Have you been to the ER, urgent care clinic since your last visit?  Hospitalized since your last visit?  Yes [facility] for right foot pains    2. Have you seen or consulted any other health care providers outside of the [facility] since your last visit?  Include any pap smears or colon screening. No

## 2025-01-15 NOTE — FLOWSHEET NOTE
01/15/25 1313   Anesthetic   Anesthetic 4% Lidocaine Cream   Wound 01/15/25 Toe (Comment  which one) Right;Anterior 1st toe   Date First Assessed/Time First Assessed: 01/15/25 1312   Present on Original Admission: Yes  Wound Approximate Age at First Assessment (Weeks): 4 weeks  Location: Toe (Comment  which one)  Wound Location Orientation: Right;Anterior  Wound Description ...   Wound Image    Dressing Status Intact   Wound Cleansed Cleansed with saline   Offloading for Diabetic Foot Ulcers Offloading ordered   Wound Length (cm) 0.4 cm   Wound Width (cm) 0.7 cm   Wound Depth (cm) 0.2 cm   Wound Surface Area (cm^2) 0.28 cm^2   Wound Volume (cm^3) 0.056 cm^3   Wound Assessment Pink/red;Eschar dry   Drainage Amount Small (< 25%)   Drainage Description Serous   Odor Mild   Melanie-wound Assessment Intact   Margins Defined edges   Wound Thickness Description not for Pressure Injury Full thickness   Wound 01/15/25 Toe (Comment  which one) Right 2nd toe   Date First Assessed/Time First Assessed: 01/15/25 1313   Present on Original Admission: Yes  Wound Approximate Age at First Assessment (Weeks): 4 weeks  Location: Toe (Comment  which one)  Wound Location Orientation: Right  Wound Description (Comments...   Wound Image    Dressing Status Intact   Wound Cleansed Cleansed with saline   Offloading for Diabetic Foot Ulcers Offloading not required   Wound Length (cm) 0.4 cm   Wound Width (cm) 0.5 cm   Wound Depth (cm) 0.2 cm   Wound Surface Area (cm^2) 0.2 cm^2   Wound Volume (cm^3) 0.04 cm^3   Wound Assessment Pink/red;Eschar dry   Drainage Amount Small (< 25%)   Drainage Description Serosanguinous   Odor Mild   Melanie-wound Assessment Intact   Margins Defined edges   Wound Thickness Description not for Pressure Injury Full thickness     BP (!) 144/80   Pulse 97   Temp 97.2 °F (36.2 °C) (Temporal)   Resp 16

## 2025-01-15 NOTE — PROGRESS NOTES
education level: None   Tobacco Use    Smoking status: Never    Smokeless tobacco: Never   Vaping Use    Vaping status: Never Used   Substance and Sexual Activity    Alcohol use: No    Drug use: Never    Sexual activity: Yes     Social Determinants of Health     Financial Resource Strain: Low Risk  (8/17/2023)    Overall Financial Resource Strain (CARDIA)     Difficulty of Paying Living Expenses: Not hard at all   Food Insecurity: No Food Insecurity (1/2/2025)    Hunger Vital Sign     Worried About Running Out of Food in the Last Year: Never true     Ran Out of Food in the Last Year: Never true   Transportation Needs: No Transportation Needs (1/2/2025)    PRAPARE - Transportation     Lack of Transportation (Medical): No     Lack of Transportation (Non-Medical): No   Physical Activity: Insufficiently Active (7/21/2022)    Exercise Vital Sign     Days of Exercise per Week: 1 day     Minutes of Exercise per Session: 10 min   Intimate Partner Violence: Not At Risk (7/21/2022)    Humiliation, Afraid, Rape, and Kick questionnaire     Fear of Current or Ex-Partner: No     Emotionally Abused: No     Physically Abused: No     Sexually Abused: No   Housing Stability: High Risk (1/2/2025)    Housing Stability Vital Sign     Unable to Pay for Housing in the Last Year: No     Number of Times Moved in the Last Year: 2     Homeless in the Last Year: No        Prior to Admission medications    Medication Sig Start Date End Date Taking? Authorizing Provider   Potassium (POTASSIMIN PO) Take 200 mg by mouth once   Yes Michael Ramirez MD   traMADol (ULTRAM) 50 MG tablet TAKE 1 TAB BY MOUTH EVERY 6 HOURS AS NEEDED FOR MODERATE OR SEVERE PAIN SCALE 4-6 FOR UP TO 5 DAYS 10/11/24  Yes Michael Ramirez MD   mupirocin (BACTROBAN) 2 % ointment APPLY OINTMENT APPLY TWO TIMES DAILY TO AREAS OF OPEN WOUNDS OR SUSPECTED INFECTION   Yes Michael Ramirez MD   TRUE METRIX BLOOD GLUCOSE TEST strip  1/9/25  Yes Michael Ramirez MD

## 2025-01-15 NOTE — PROGRESS NOTES
[facility] DIABETES AND ENDOCRINOLOGY                 Yusra Washburn MD           Referred by: Self referred       Sesar Echavarria  was evaluated through a synchronous (real-time) audio-video encounter. The patient (or guardian if applicable) is aware that this is a billable service, which includes applicable co-pays. This Virtual Visit was conducted with patient's (and/or legal guardian's) consent. Patient identification was verified, and a caregiver was present when appropriate.   The patient was located at Home:   Provider was located at Facility (Appt Dept): [address] [place] [id number]            Chief Complaint   Patient presents with    Follow-up    Diabetes     Type 2         Type 2 diabetes mellitus on insulin  Trulicity, decreased appetite  Pre -Dinner BG - < 130   Off  Lantus  2024: Osteomyelitis, 1 PICC line, antibiotics  History of Present Illness  The patient presents for evaluation of diabetes.    Admitted to the ER on 01/02/2025 due to a blood glucose spike (196), attributed to steroid use. Discontinued steroids and prescribed vancomycin for osteomyelitis. Blood glucose levels are decreasing, but she reports a loss of appetite. Not on insulin therapy.   Morning levels range from < 120  occasionally dropping to 99. Scheduled for a podiatrist consultation tomorrow for potential big toe surgery. Completed 15 sessions of hyperbaric oxygen therapy with significant improvement. Diet includes water, herbal tea, egg whites, yogurt with nuts, and salads. Post-dinner blood glucose levels vary between 125 and 155. Eliminated sugar and regular bread, opting for Italian bread. No cravings since discontinuing sugar. Not taking Lantus.             Prior history February 2019   She has not been checking the blood glucose .she is sleeping better.  She is on Metformin and self stopped Lantus .   I have asked her to resume Lantus several times, she is going by the symptoms and

## 2025-01-15 NOTE — PATIENT INSTRUCTIONS
Discharge Instructions/Wound Care Orders  [facility]   [address] [address]  Phone: [phone] Fax: [phone]    NAME:  Sesar Echavarria  YOB: 1970  MEDICAL RECORD NUMBER:  [id number]  DATE:  January 15, 2025    Wound Care Orders:  Right toe wounds-Cleanse with baby shampoo. Allow to lather. Rinse and pat dry. Apply betadine wet to dry dressing to wounds. Secure with roll guaze and tape. Change three times a week.    Activity:  [x] Elevate leg(s) above the level of the heart when sitting and avoid prolonged standing in one place.    [x] Wear off-loading shoe/boot on the affected foot when walking.  [x] Do not get dressing wet.    Dietary:  [] Diet as tolerated      [x] Diabetic Diet            [] Increase Protein: examples (Meat, cheese, eggs, greek yogurt, fish, nuts)          [] [employer] Therapeutic Nutrition Powder  [] Other:  [] Dial a Dietician : Call [employer] at [phone] enter code (249) when prompted. M-F 9am-5pm EST.   Follow-up:  [x] Return Appointment: As Needed  [] Ordered tests:         Wound Care Center Information: Should you experience any significant changes in your wound(s) or have questions about your wound care, please contact the [facility] at MONDAY - FRIDAY 8:00 am - 4:30.  If you need help with your wound outside these hours and cannot wait until we are again available, contact your PCP or go to the hospital emergency room.     PLEASE NOTE: IF YOU ARE UNABLE TO OBTAIN WOUND SUPPLIES, CONTINUE TO USE THE SUPPLIES YOU HAVE AVAILABLE UNTIL YOU ARE ABLE TO REACH US. IT IS MOST IMPORTANT TO KEEP THE WOUND COVERED AT ALL TIMES.     Physician Signature:_______________________    Date: ___________ Time:  ____________

## 2025-01-16 RX ORDER — MUPIROCIN 20 MG/G
OINTMENT TOPICAL ONCE
Status: CANCELLED | OUTPATIENT
Start: 2025-01-16 | End: 2025-01-16

## 2025-01-16 RX ORDER — TRIAMCINOLONE ACETONIDE 1 MG/G
OINTMENT TOPICAL ONCE
Status: CANCELLED | OUTPATIENT
Start: 2025-01-16 | End: 2025-01-16

## 2025-01-16 RX ORDER — LIDOCAINE HYDROCHLORIDE 20 MG/ML
JELLY TOPICAL ONCE
Status: CANCELLED | OUTPATIENT
Start: 2025-01-16 | End: 2025-01-16

## 2025-01-16 RX ORDER — GINSENG 100 MG
CAPSULE ORAL ONCE
Status: CANCELLED | OUTPATIENT
Start: 2025-01-16 | End: 2025-01-16

## 2025-01-16 RX ORDER — GENTAMICIN SULFATE 1 MG/G
OINTMENT TOPICAL ONCE
Status: CANCELLED | OUTPATIENT
Start: 2025-01-16 | End: 2025-01-16

## 2025-01-16 RX ORDER — CLOBETASOL PROPIONATE 0.5 MG/G
OINTMENT TOPICAL ONCE
Status: CANCELLED | OUTPATIENT
Start: 2025-01-16 | End: 2025-01-16

## 2025-01-16 RX ORDER — BACITRACIN ZINC AND POLYMYXIN B SULFATE 500; 1000 [USP'U]/G; [USP'U]/G
OINTMENT TOPICAL ONCE
Status: CANCELLED | OUTPATIENT
Start: 2025-01-16 | End: 2025-01-16

## 2025-01-16 RX ORDER — BETAMETHASONE DIPROPIONATE 0.5 MG/G
CREAM TOPICAL ONCE
Status: CANCELLED | OUTPATIENT
Start: 2025-01-16 | End: 2025-01-16

## 2025-01-16 RX ORDER — LIDOCAINE 40 MG/G
CREAM TOPICAL ONCE
Status: CANCELLED | OUTPATIENT
Start: 2025-01-16 | End: 2025-01-16

## 2025-01-16 RX ORDER — LIDOCAINE 50 MG/G
OINTMENT TOPICAL ONCE
Status: CANCELLED | OUTPATIENT
Start: 2025-01-16 | End: 2025-01-16

## 2025-01-16 RX ORDER — NEOMYCIN/BACITRACIN/POLYMYXINB 3.5-400-5K
OINTMENT (GRAM) TOPICAL ONCE
Status: CANCELLED | OUTPATIENT
Start: 2025-01-16 | End: 2025-01-16

## 2025-01-16 RX ORDER — SODIUM CHLOR/HYPOCHLOROUS ACID 0.033 %
SOLUTION, IRRIGATION IRRIGATION ONCE
Status: CANCELLED | OUTPATIENT
Start: 2025-01-16 | End: 2025-01-16

## 2025-01-16 RX ORDER — LIDOCAINE HYDROCHLORIDE 40 MG/ML
SOLUTION TOPICAL ONCE
Status: CANCELLED | OUTPATIENT
Start: 2025-01-16 | End: 2025-01-16

## 2025-01-21 ENCOUNTER — OFFICE VISIT (OUTPATIENT)
Facility: CLINIC | Age: 55
End: 2025-01-21

## 2025-01-21 VITALS
OXYGEN SATURATION: 99 % | HEIGHT: 64 IN | DIASTOLIC BLOOD PRESSURE: 84 MMHG | TEMPERATURE: 97.8 F | HEART RATE: 84 BPM | RESPIRATION RATE: 16 BRPM | SYSTOLIC BLOOD PRESSURE: 143 MMHG | WEIGHT: 215 LBS | BODY MASS INDEX: 36.7 KG/M2

## 2025-01-21 DIAGNOSIS — L97.411 DIABETIC ULCER OF RIGHT MIDFOOT ASSOCIATED WITH TYPE 2 DIABETES MELLITUS, LIMITED TO BREAKDOWN OF SKIN (HCC): ICD-10-CM

## 2025-01-21 DIAGNOSIS — E78.2 MIXED HYPERLIPIDEMIA: ICD-10-CM

## 2025-01-21 DIAGNOSIS — I10 ESSENTIAL HYPERTENSION: ICD-10-CM

## 2025-01-21 DIAGNOSIS — R80.9 MICROALBUMINURIA: ICD-10-CM

## 2025-01-21 DIAGNOSIS — E11.610 DIABETIC CHARCOT FOOT (HCC): ICD-10-CM

## 2025-01-21 DIAGNOSIS — Z87.39 HISTORY OF OSTEOMYELITIS: ICD-10-CM

## 2025-01-21 DIAGNOSIS — Z79.4 TYPE 2 DIABETES MELLITUS WITH HYPERGLYCEMIA, WITH LONG-TERM CURRENT USE OF INSULIN (HCC): ICD-10-CM

## 2025-01-21 DIAGNOSIS — Z09 HOSPITAL DISCHARGE FOLLOW-UP: Primary | ICD-10-CM

## 2025-01-21 DIAGNOSIS — E11.65 TYPE 2 DIABETES MELLITUS WITH HYPERGLYCEMIA, WITH LONG-TERM CURRENT USE OF INSULIN (HCC): ICD-10-CM

## 2025-01-21 DIAGNOSIS — E11.621 DIABETIC ULCER OF RIGHT MIDFOOT ASSOCIATED WITH TYPE 2 DIABETES MELLITUS, LIMITED TO BREAKDOWN OF SKIN (HCC): ICD-10-CM

## 2025-01-21 PROBLEM — B99.9 FEVER DUE TO INFECTION: Status: RESOLVED | Noted: 2025-01-02 | Resolved: 2025-01-21

## 2025-01-21 PROBLEM — E66.811 CLASS 1 OBESITY DUE TO EXCESS CALORIES WITH SERIOUS COMORBIDITY AND BODY MASS INDEX (BMI) OF 34.0 TO 34.9 IN ADULT: Status: RESOLVED | Noted: 2023-08-17 | Resolved: 2025-01-21

## 2025-01-21 PROBLEM — E66.09 CLASS 1 OBESITY DUE TO EXCESS CALORIES WITH SERIOUS COMORBIDITY AND BODY MASS INDEX (BMI) OF 34.0 TO 34.9 IN ADULT: Status: RESOLVED | Noted: 2023-08-17 | Resolved: 2025-01-21

## 2025-01-21 PROBLEM — M86.171 ACUTE OSTEOMYELITIS OF RIGHT FOOT: Status: RESOLVED | Noted: 2024-08-14 | Resolved: 2025-01-21

## 2025-01-21 ASSESSMENT — PATIENT HEALTH QUESTIONNAIRE - PHQ9
1. LITTLE INTEREST OR PLEASURE IN DOING THINGS: NOT AT ALL
SUM OF ALL RESPONSES TO PHQ QUESTIONS 1-9: 0
SUM OF ALL RESPONSES TO PHQ QUESTIONS 1-9: 0
SUM OF ALL RESPONSES TO PHQ9 QUESTIONS 1 & 2: 0
2. FEELING DOWN, DEPRESSED OR HOPELESS: NOT AT ALL
SUM OF ALL RESPONSES TO PHQ QUESTIONS 1-9: 0
SUM OF ALL RESPONSES TO PHQ QUESTIONS 1-9: 0

## 2025-01-21 NOTE — PROGRESS NOTES
Post-Discharge Transitional Care  Follow Up      Sesar Echavarria   YOB: 1970    Date of Office Visit:  1/21/2025  Date of Hospital Admission: 1/2/25  Date of Hospital Discharge: 1/4/25  Risk of hospital readmission (high >=14%. Medium >=10%) :Readmission Risk Score: 7.9      Care management risk score Rising risk (score 2-5) and Complex Care (Scores >=6): No Risk Score On File     Non face to face  following discharge, date last encounter closed (first attempt may have been earlier): *No documented post hospital discharge outreach found in the last 14 days    Call initiated 2 business days of discharge: *No response recorded in the last 14 days    ASSESSMENT/PLAN:   Hospital discharge follow-up  -     MD DISCHARGE MEDS RECONCILED W/ CURRENT OUTPATIENT MED LIST  Diabetic Charcot foot (HCC)  Type 2 diabetes mellitus with hyperglycemia, with long-term current use of insulin (HCC)  History of osteomyelitis  Mixed hyperlipidemia  Essential hypertension  Microalbuminuria  Diabetic ulcer of right midfoot associated with type 2 diabetes mellitus, with fat layer exposed (HCC)    Medical Decision Making: moderate complexity  Return in about 6 months (around 7/21/2025) for OVE/ microalbuminuria/Dm2/HTN.    On this date 1/21/2025 I have spent 40 minutes reviewing previous notes, test results and face to face with the patient discussing the diagnosis and importance of compliance with the treatment plan as well as documenting on the day of the visit.     Assessment & Plan  1. Post-hospitalization follow-up.  Her condition has shown significant improvement since the initial encounter. She was advised to maintain her current regimen of lisinopril, which provides renal protection in the context of her diabetes. She was also encouraged to continue her efforts in managing her blood glucose levels. She was informed that the paperwork for her disability application would be forwarded to her primary care physician. She was

## 2025-01-21 NOTE — PROGRESS NOTES
\"Have you been to the ER, urgent care clinic since your last visit?  Hospitalized since your last visit?\"    2 nights [facility] foot infection     “Have you seen or consulted any other health care providers outside our system since your last visit?”    NO     “Have you had a pap smear?”    NO    No cervical cancer screening on file       “Have you had a colorectal cancer screening such as a colonoscopy/FIT/Cologuard?    NO    No colonoscopy on file  No cologuard on file  No FIT/FOBT on file   No flexible sigmoidoscopy on file     “Have you had a diabetic eye exam?”    NO     Date of last diabetic eye exam: 7/7/2023   Chief Complaint   Patient presents with    Follow-Up from Hospital     BP (!) 143/84 (Site: Left Upper Arm, Position: Sitting, Cuff Size: Large Adult)   Pulse 84   Temp 97.8 °F (36.6 °C) (Temporal)   Resp 16   Ht 1.626 m (5' 4\")   Wt 97.5 kg (215 lb)   SpO2 99%   BMI 36.90 kg/m²

## 2025-02-03 DIAGNOSIS — R80.9 MICROALBUMINURIA: ICD-10-CM

## 2025-02-03 RX ORDER — LISINOPRIL 2.5 MG/1
2.5 TABLET ORAL DAILY
Qty: 90 TABLET | Refills: 1 | Status: SHIPPED | OUTPATIENT
Start: 2025-02-03

## 2025-02-03 NOTE — TELEPHONE ENCOUNTER
Requested Prescriptions     Pending Prescriptions Disp Refills    lisinopril (PRINIVIL;ZESTRIL) 2.5 MG tablet [Pharmacy Med Name: LISINOPRIL 2.5 MG TABLET] 30 tablet 3     Sig: TAKE 1 TABLET BY MOUTH EVERY DAY            Date of last OV:1/21/25  Future OV visit scheduled:  [x] Yes -> Date: 7/22/25  [] No    Last Refill: [] N/A  Date:9/6/24  Qty:30  # of refills:3    Med pending for provider review:  [x] Yes  [] No (provide reason why):     Requested Pharmacy updated in [facility]:  [x] Yes    Applicable labs (provide date of completion):  [] Diabetic med- A1c:  [] Cholesterol med- Lipids:  [] BP med- CMP or BMP:   [] Thyroid med- TSH:  [] Gout med- Uric acid:  [] Prostate med- PSA:  [] Other (provide what type of med and lab):    Additional notes:

## 2025-03-03 RX ORDER — ROSUVASTATIN CALCIUM 20 MG/1
20 TABLET, COATED ORAL NIGHTLY
Qty: 90 TABLET | Refills: 3 | Status: SHIPPED | OUTPATIENT
Start: 2025-03-03

## 2025-04-02 DIAGNOSIS — E11.65 TYPE 2 DIABETES MELLITUS WITH HYPERGLYCEMIA, WITH LONG-TERM CURRENT USE OF INSULIN (HCC): ICD-10-CM

## 2025-04-02 DIAGNOSIS — Z79.4 TYPE 2 DIABETES MELLITUS WITH HYPERGLYCEMIA, WITH LONG-TERM CURRENT USE OF INSULIN (HCC): ICD-10-CM

## 2025-04-02 RX ORDER — DULAGLUTIDE 3 MG/.5ML
INJECTION, SOLUTION SUBCUTANEOUS
Qty: 2 ML | Refills: 11 | Status: SHIPPED | OUTPATIENT
Start: 2025-04-02

## 2025-04-02 RX ORDER — CALCIUM CITRATE/VITAMIN D3 200MG-6.25
TABLET ORAL
Qty: 100 STRIP | Refills: 11 | Status: SHIPPED | OUTPATIENT
Start: 2025-04-02

## 2025-04-07 PROBLEM — L03.115 CELLULITIS OF RIGHT FOOT: Status: ACTIVE | Noted: 2024-10-13

## 2025-04-07 PROBLEM — M86.9 OSTEOMYELITIS (HCC): Status: ACTIVE | Noted: 2024-01-04

## 2025-04-18 ENCOUNTER — LAB (OUTPATIENT)
Age: 55
End: 2025-04-18

## 2025-04-19 LAB
ALBUMIN/CREAT UR: 304 MG/G CREAT (ref 0–29)
CREAT UR-MCNC: 29.9 MG/DL
HBA1C MFR BLD: 8.6 % (ref 4.8–5.6)
LDLC SERPL DIRECT ASSAY-MCNC: 140 MG/DL (ref 0–99)
MICROALBUMIN UR-MCNC: 90.8 UG/ML

## 2025-04-24 ENCOUNTER — OFFICE VISIT (OUTPATIENT)
Age: 55
End: 2025-04-24
Payer: MEDICAID

## 2025-04-24 VITALS
RESPIRATION RATE: 18 BRPM | SYSTOLIC BLOOD PRESSURE: 128 MMHG | HEART RATE: 80 BPM | DIASTOLIC BLOOD PRESSURE: 67 MMHG | BODY MASS INDEX: 36.7 KG/M2 | OXYGEN SATURATION: 100 % | HEIGHT: 64 IN | WEIGHT: 215 LBS | TEMPERATURE: 98 F

## 2025-04-24 DIAGNOSIS — Z79.4 TYPE 2 DIABETES MELLITUS WITH HYPERGLYCEMIA, WITH LONG-TERM CURRENT USE OF INSULIN (HCC): Primary | ICD-10-CM

## 2025-04-24 DIAGNOSIS — E78.2 MIXED HYPERLIPIDEMIA: ICD-10-CM

## 2025-04-24 DIAGNOSIS — E11.65 TYPE 2 DIABETES MELLITUS WITH HYPERGLYCEMIA, WITH LONG-TERM CURRENT USE OF INSULIN (HCC): Primary | ICD-10-CM

## 2025-04-24 DIAGNOSIS — I10 ESSENTIAL HYPERTENSION: ICD-10-CM

## 2025-04-24 PROCEDURE — 3052F HG A1C>EQUAL 8.0%<EQUAL 9.0%: CPT | Performed by: INTERNAL MEDICINE

## 2025-04-24 PROCEDURE — 99215 OFFICE O/P EST HI 40 MIN: CPT | Performed by: INTERNAL MEDICINE

## 2025-04-24 PROCEDURE — 3074F SYST BP LT 130 MM HG: CPT | Performed by: INTERNAL MEDICINE

## 2025-04-24 PROCEDURE — G2211 COMPLEX E/M VISIT ADD ON: HCPCS | Performed by: INTERNAL MEDICINE

## 2025-04-24 PROCEDURE — 3078F DIAST BP <80 MM HG: CPT | Performed by: INTERNAL MEDICINE

## 2025-04-24 RX ORDER — INSULIN GLARGINE 100 [IU]/ML
INJECTION, SOLUTION SUBCUTANEOUS
COMMUNITY

## 2025-04-24 NOTE — PROGRESS NOTES
Sesar Echavarria is a 54 y.o. female here for   Chief Complaint   Patient presents with    Diabetes       1. Have you been to the ER, urgent care clinic since your last visit?  Hospitalized since your last visit? -no    2. Have you seen or consulted any other health care providers outside of the [facility] since your last visit?  Include any pap smears or colon screening.-Ortho and eye doc

## 2025-04-24 NOTE — PROGRESS NOTES
[facility] DIABETES AND ENDOCRINOLOGY                 Yusra Washburn MD           Referred by: Self referred       Sesar Echavarria  was evaluated through a synchronous (real-time) audio-video encounter. The patient (or guardian if applicable) is aware that this is a billable service, which includes applicable co-pays. This Virtual Visit was conducted with patient's (and/or legal guardian's) consent. Patient identification was verified, and a caregiver was present when appropriate.   The patient was located at Home:   Provider [facility] [address] [place] [id number]            Chief Complaint   Patient presents with    Diabetes         Type 2 diabetes mellitus on insulin  2024: Osteomyelitis, 1 PICC line, antibiotics  History of Present Illness    The patient presents for evaluation of diabetes mellitus, Charcot foot, and hypercholesterolemia.    Chief complaint: management of diabetes and associated complications. She manages blood glucose through dietary modifications, avoiding sweets, cakes, and certain carbohydrates now.   She indulged in high-sugar foods during a trip to [place]. Medications: Trulicity and Lantus.   She advised against walking >1 hour due to foot condition. Consumes protein shakes at 2:00 or 3:00 AM.    History of Charcot foot,    High-fat diet during recent trip         Prior history February 2019   She has not been checking the blood glucose .she is sleeping better.  She is on Metformin and self stopped Lantus .   I have asked her to resume Lantus several times, she is going by the symptoms and since she feels better she is not taking insulin.   She is taking only metformin   She is not checking the blood glucose   She has been gaining weight      She was diagnosed with type 2 diabetes several years ago   She is a hospice nurse by profession, knows about all complications and also the nutritional facts but it is just

## 2025-04-28 ENCOUNTER — COMMUNITY OUTREACH (OUTPATIENT)
Facility: CLINIC | Age: 55
End: 2025-04-28

## 2025-05-13 DIAGNOSIS — Z79.4 TYPE 2 DIABETES MELLITUS WITH HYPERGLYCEMIA, WITH LONG-TERM CURRENT USE OF INSULIN (HCC): Primary | ICD-10-CM

## 2025-05-13 DIAGNOSIS — E11.65 TYPE 2 DIABETES MELLITUS WITH HYPERGLYCEMIA, WITH LONG-TERM CURRENT USE OF INSULIN (HCC): Primary | ICD-10-CM

## 2025-05-14 RX ORDER — INSULIN GLARGINE 100 [IU]/ML
10 INJECTION, SOLUTION SUBCUTANEOUS NIGHTLY
Qty: 15 ML | Refills: 3 | Status: SHIPPED | OUTPATIENT
Start: 2025-05-14

## 2025-05-22 ENCOUNTER — HOSPITAL ENCOUNTER (OUTPATIENT)
Facility: HOSPITAL | Age: 55
Discharge: HOME | End: 2025-05-22
Attending: ORTHOPAEDIC SURGERY
Payer: MEDICAID

## 2025-05-22 VITALS
DIASTOLIC BLOOD PRESSURE: 69 MMHG | TEMPERATURE: 97.9 F | HEIGHT: 64 IN | WEIGHT: 215 LBS | BODY MASS INDEX: 36.7 KG/M2 | RESPIRATION RATE: 17 BRPM | HEART RATE: 85 BPM | SYSTOLIC BLOOD PRESSURE: 136 MMHG

## 2025-05-22 DIAGNOSIS — S91.101A OPEN WOUND OF RIGHT GREAT TOE, INITIAL ENCOUNTER: Primary | ICD-10-CM

## 2025-05-22 DIAGNOSIS — S91.101D OPEN WOUND OF RIGHT GREAT TOE, SUBSEQUENT ENCOUNTER: ICD-10-CM

## 2025-05-22 PROCEDURE — 99213 OFFICE O/P EST LOW 20 MIN: CPT

## 2025-05-22 PROCEDURE — 11042 DBRDMT SUBQ TIS 1ST 20SQCM/<: CPT

## 2025-05-22 RX ORDER — SILVER SULFADIAZINE 10 MG/G
CREAM TOPICAL PRN
OUTPATIENT
Start: 2025-05-22

## 2025-05-22 RX ORDER — SODIUM CHLOR/HYPOCHLOROUS ACID 0.033 %
SOLUTION, IRRIGATION IRRIGATION PRN
OUTPATIENT
Start: 2025-05-22

## 2025-05-22 RX ORDER — CLOBETASOL PROPIONATE 0.5 MG/G
OINTMENT TOPICAL PRN
Status: CANCELLED | OUTPATIENT
Start: 2025-05-22

## 2025-05-22 RX ORDER — GENTAMICIN SULFATE 1 MG/G
OINTMENT TOPICAL PRN
Status: CANCELLED | OUTPATIENT
Start: 2025-05-22

## 2025-05-22 RX ORDER — GINSENG 100 MG
CAPSULE ORAL PRN
Status: CANCELLED | OUTPATIENT
Start: 2025-05-22

## 2025-05-22 RX ORDER — MUPIROCIN 20 MG/G
OINTMENT TOPICAL PRN
OUTPATIENT
Start: 2025-05-22

## 2025-05-22 RX ORDER — LIDOCAINE 40 MG/G
CREAM TOPICAL PRN
OUTPATIENT
Start: 2025-05-22

## 2025-05-22 RX ORDER — LIDOCAINE HYDROCHLORIDE 40 MG/ML
SOLUTION TOPICAL ONCE
Status: DISCONTINUED | OUTPATIENT
Start: 2025-05-22 | End: 2025-05-23 | Stop reason: HOSPADM

## 2025-05-22 RX ORDER — CLOBETASOL PROPIONATE 0.5 MG/G
OINTMENT TOPICAL PRN
OUTPATIENT
Start: 2025-05-22

## 2025-05-22 RX ORDER — BACITRACIN ZINC AND POLYMYXIN B SULFATE 500; 1000 [USP'U]/G; [USP'U]/G
OINTMENT TOPICAL PRN
Status: CANCELLED | OUTPATIENT
Start: 2025-05-22

## 2025-05-22 RX ORDER — SILVER SULFADIAZINE 10 MG/G
CREAM TOPICAL PRN
Status: CANCELLED | OUTPATIENT
Start: 2025-05-22

## 2025-05-22 RX ORDER — GENTAMICIN SULFATE 1 MG/G
OINTMENT TOPICAL PRN
OUTPATIENT
Start: 2025-05-22

## 2025-05-22 RX ORDER — NEOMYCIN/BACITRACIN/POLYMYXINB 3.5-400-5K
OINTMENT (GRAM) TOPICAL PRN
Status: CANCELLED | OUTPATIENT
Start: 2025-05-22

## 2025-05-22 RX ORDER — LIDOCAINE 50 MG/G
OINTMENT TOPICAL PRN
Status: CANCELLED | OUTPATIENT
Start: 2025-05-22

## 2025-05-22 RX ORDER — NEOMYCIN/BACITRACIN/POLYMYXINB 3.5-400-5K
OINTMENT (GRAM) TOPICAL PRN
OUTPATIENT
Start: 2025-05-22

## 2025-05-22 RX ORDER — TRIAMCINOLONE ACETONIDE 1 MG/G
OINTMENT TOPICAL PRN
Status: CANCELLED | OUTPATIENT
Start: 2025-05-22

## 2025-05-22 RX ORDER — LIDOCAINE HYDROCHLORIDE 20 MG/ML
JELLY TOPICAL PRN
OUTPATIENT
Start: 2025-05-22

## 2025-05-22 RX ORDER — GINSENG 100 MG
CAPSULE ORAL PRN
OUTPATIENT
Start: 2025-05-22

## 2025-05-22 RX ORDER — LIDOCAINE 40 MG/G
CREAM TOPICAL PRN
Status: CANCELLED | OUTPATIENT
Start: 2025-05-22

## 2025-05-22 RX ORDER — LIDOCAINE HYDROCHLORIDE 40 MG/ML
SOLUTION TOPICAL PRN
OUTPATIENT
Start: 2025-05-22

## 2025-05-22 RX ORDER — MUPIROCIN 20 MG/G
OINTMENT TOPICAL PRN
Status: CANCELLED | OUTPATIENT
Start: 2025-05-22

## 2025-05-22 RX ORDER — BETAMETHASONE DIPROPIONATE 0.5 MG/G
CREAM TOPICAL PRN
Status: CANCELLED | OUTPATIENT
Start: 2025-05-22

## 2025-05-22 RX ORDER — SODIUM CHLOR/HYPOCHLOROUS ACID 0.033 %
SOLUTION, IRRIGATION IRRIGATION PRN
Status: CANCELLED | OUTPATIENT
Start: 2025-05-22

## 2025-05-22 RX ORDER — BETAMETHASONE DIPROPIONATE 0.5 MG/G
CREAM TOPICAL PRN
OUTPATIENT
Start: 2025-05-22

## 2025-05-22 RX ORDER — LIDOCAINE HYDROCHLORIDE 20 MG/ML
JELLY TOPICAL PRN
Status: CANCELLED | OUTPATIENT
Start: 2025-05-22

## 2025-05-22 RX ORDER — BACITRACIN ZINC AND POLYMYXIN B SULFATE 500; 1000 [USP'U]/G; [USP'U]/G
OINTMENT TOPICAL PRN
OUTPATIENT
Start: 2025-05-22

## 2025-05-22 RX ORDER — LIDOCAINE 50 MG/G
OINTMENT TOPICAL PRN
OUTPATIENT
Start: 2025-05-22

## 2025-05-22 RX ORDER — TRIAMCINOLONE ACETONIDE 1 MG/G
OINTMENT TOPICAL PRN
OUTPATIENT
Start: 2025-05-22

## 2025-05-22 RX ORDER — LIDOCAINE HYDROCHLORIDE 40 MG/ML
SOLUTION TOPICAL PRN
Status: CANCELLED | OUTPATIENT
Start: 2025-05-22

## 2025-05-22 ASSESSMENT — PAIN SCALES - GENERAL: PAINLEVEL_OUTOF10: 8

## 2025-05-22 ASSESSMENT — PAIN DESCRIPTION - LOCATION: LOCATION: FOOT

## 2025-05-22 ASSESSMENT — PAIN DESCRIPTION - DESCRIPTORS: DESCRIPTORS: ACHING;BURNING;STABBING

## 2025-05-22 ASSESSMENT — PAIN DESCRIPTION - ORIENTATION: ORIENTATION: RIGHT

## 2025-05-22 NOTE — WOUND CARE
Wound Clinic Physician Orders and Discharge Instructions  [facility]  [address], [address]  [place] [address]  Telephone: [phone]     FAX [phone]    NAME:  Sesar Echavarria  YOB: 1970  MEDICAL RECORD NUMBER:  [id number]  DATE:  5/22/2025      Return Appointment:  [x] Dressing Supply Provider: NAS   [] Home Healthcare:  [x] Return Appointment: 3 Week(s)  [] Nurse Visit:      [] Discharge from [facility]: [] Healed        [] Refer to Provider:         [] Consult    Follow-up Information:  [] Ordered Tests:  [] Referrals:   [] Rx:   [] Would Culture:  [] Other:       Wound Cleansing:   Do not scrub or use excessive force.  Cleanse wound prior to applying a clean dressing with:  [x] Normal Saline OR   [x] Cleanse wound with Mild Soap & Water     [] Wound Cleanser   [] Keep Wound Dry in Shower  [] Wear a cast cover to shower  [] Other:       Topical Treatments:  Do not apply lotions, creams, or ointments to wound bed unless directed.   [] Apply moisturizing lotion to skin surrounding the wound prior to dressing change.  [] Apply antifungal ointment to skin surrounding the wound prior to dressing change.  [] Apply thin film of moisture barrier ointment to skin immediately around wound.  [] Apply Betadine to skin immediately around wound   [] Apply Skin Prep to skin immediately around wound  [] Other:      Dressings:           Wound Location RIGHT FOOT    [x] Apply Primary Dressing:       [] MediHoney Gel [] MediHoney Alginate  [] Calcium Alginate with Silver   [] Calcium Alginate without silver   [] Collagen with silver [] Collagen without Silver    [] Santyl with moist saline gauze     [x] Hydrofera Blue (cut to size and moistened with normal saline)  [] Hydrofera Blue Ready (cut to size)      [] Normal Saline wet to dry  [] Betadine wet to dry    [] Hydrogel  [] Mepitel     [] Bactroban/Mupirocin   [] Iodoform Packing Strip [] Plain Packing Strip   [] Skin 
    Wound Care Supplies      Supply Company:     [employer] [address] [place] [address] f: [phone] f: [phone] p: [phone] [email or web address]      Ordering Center:     [facility]  [address] [place] [address], [address] [address]    Phone: [phone]  Fax: [phone]    Patient Information:      Sesar Echavarria  [address]  [address]  [facility] [address]   [phone]   : 1970  AGE: 54 y.o.     GENDER: female   EPISODE DATE: 2025    Insurance:      PRIMARY INSURANCE:  Plan: [facility] CCCP HEALTHKEEPERS PLUS  Coverage: [facility] MEDICAID  Effective Date: 2018  Group Number: [unfilled]  Subscriber Number: [id number] - (Medicaid Managed)    Payer/Plan Subscr  Sex Relation Sub. Ins. ID Effective Group Num   1. [facility] MEDIC* SESAR ECHAVARRIA 1970 Female Self [id number]                                   [address] [address]       Patient Wound Information:      Problem List Items Addressed This Visit    None      WOUNDS REQUIRING DRESSING SUPPLIES:     Wound 25 Foot Right #1 (Active)   Wound Image   25 1012   Wound Etiology Pressure Unstageable 25 1012   Dressing Status Other (Comment) 25 1012   Wound Cleansed Cleansed with saline 25 1012   Wound Length (cm) 0.7 cm 25 1012   Wound Width (cm) 0.7 cm 25 1012   Wound Depth (cm) 0.2 cm 25 1012   Wound Surface Area (cm^2) 0.49 cm^2 25 1012   Wound Volume (cm^3) 0.098 cm^3 25 1012   Wound Assessment Slough 25 1012   Drainage Amount Moderate (25-50%) 25 1012   Drainage Description Serosanguinous 25 1012   Odor None 25 1012   Melanie-wound Assessment Hyperpigmented 25 1012   Margins Attached edges 25 1012   Number of days: 0          Supplies Requested :      WOUND #: 1   PRIMARY DRESSING:  Hydrofera Blue pad    Cover and Secure with: 2X2 gauze pad  Other 4x4 
MG/0.5ML SOAJ INJECT 3 MG UNDER THE SKIN ONCE A WEEK STOP 1.5 MG (Patient not taking: Reported on 4/24/2025) 2 mL 11    rosuvastatin (CRESTOR) 20 MG tablet TAKE 1 TABLET BY MOUTH EVERY DAY AT NIGHT 90 tablet 3    lisinopril (PRINIVIL;ZESTRIL) 2.5 MG tablet TAKE 1 TABLET BY MOUTH EVERY DAY 90 tablet 1    Potassium (POTASSIMIN PO) Take 200 mg by mouth once      clobetasol (TEMOVATE) 0.05 % ointment PLEASE SEE ATTACHED FOR DETAILED DIRECTIONS      hydrOXYzine HCl (ATARAX) 25 MG tablet Take 1 tablet by mouth nightly as needed for Itching 90 tablet 1    Ashwagandha 500 MG CAPS Take 500 mg by mouth daily      Nutritional Supplements (NUTRITIONAL SUPPLEMENT PLUS PO) Take 1 tablet by mouth daily Indications: Self reported, CBD chewable supplement Self reported, CBD chewable supplement      ferrous sulfate (IRON 325) 325 (65 Fe) MG tablet Take 1 tablet by mouth daily (with breakfast)      insulin lispro, 1 Unit Dial, (HUMALOG KWIKPEN) 100 UNIT/ML SOPN Use with SSI. Max units daily: 30 units 30 mL 3    Dulaglutide (TRULICITY) 4.5 MG/0.5ML SOPN Inject 4.5 mg into the skin once a week E11.65 12 Adjustable Dose Pre-filled Pen Syringe 3    Acetaminophen (TYLENOL PO) Take 625 mg by mouth daily      TURMERIC PO 1 tablet by Other route daily      triamcinolone (KENALOG) 0.1 % cream       nystatin (MYCOSTATIN) 217759 UNIT/GM cream       Multiple Vitamin (DAILY VITES) TABS Take by mouth daily Two in AM      MAGNESIUM ASPARTATE PO Take 4 tablets by mouth nightly And one in the AM      dupilumab (DUPIXENT) 300 MG/2ML SOPN injection Inject 2 mLs into the skin every 14 days      VITAMIN K PO Take 1 capsule by mouth daily      VITAMIN E PO Take 1 capsule by mouth daily      vitamin B-12 (CYANOCOBALAMIN) 1000 MCG tablet Take 1 tablet by mouth daily      VITAMIN A PO Take 1 capsule by mouth daily      vitamin D3 (CHOLECALCIFEROL) 125 MCG (5000 UT) TABS tablet Take by mouth daily      hydrocortisone 1 % ointment Apply topically as needed

## 2025-05-22 NOTE — DISCHARGE INSTRUCTIONS
Megan Osorio RN  Registered Nurse     Wound Care     Signed     Date of Service: 5/22/2025 10:00 AM     Signed                          Wound Clinic Physician Orders and Discharge Instructions  [facility]  [facility] [address]  [place] [id number]  Telephone: [phone]     FAX [phone]     NAME:  Sesar Echavarria  YOB: 1970  MEDICAL RECORD NUMBER:  [id number]  DATE:  5/22/2025        Return Appointment:  [x] Dressing Supply Provider: NAS   [] Home Healthcare:  [x] Return Appointment: 3 Week(s)  [] Nurse Visit:       [] Discharge from [facility]: [] Healed        [] Refer to Provider:         [] Consult     Follow-up Information:  [] Ordered Tests:  [] Referrals:   [] Rx:   [] Would Culture:  [] Other:         Wound Cleansing:   Do not scrub or use excessive force.  Cleanse wound prior to applying a clean dressing with:  [x] Normal Saline OR    [x] Cleanse wound with Mild Soap & Water     [] Wound Cleanser   [] Keep Wound Dry in Shower  [] Wear a cast cover to shower  [] Other:        Topical Treatments:  Do not apply lotions, creams, or ointments to wound bed unless directed.   [] Apply moisturizing lotion to skin surrounding the wound prior to dressing change.  [] Apply antifungal ointment to skin surrounding the wound prior to dressing change.  [] Apply thin film of moisture barrier ointment to skin immediately around wound.  [] Apply Betadine to skin immediately around wound   [] Apply Skin Prep to skin immediately around wound  [] Other:                 Dressings:                  Wound Location RIGHT FOOT         [x] Apply Primary Dressing:                                          [] MediHoney Gel      [] MediHoney Alginate  [] Calcium Alginate with Silver   [] Calcium Alginate without silver              [] Collagen with silver            [] Collagen without Silver    [] Santyl with moist saline gauze                [x] Hydrofera Blue (cut to size

## 2025-06-10 DIAGNOSIS — G47.00 INSOMNIA, UNSPECIFIED TYPE: ICD-10-CM

## 2025-06-10 NOTE — TELEPHONE ENCOUNTER
Requested Prescriptions     Pending Prescriptions Disp Refills    hydrOXYzine HCl (ATARAX) 25 MG tablet 90 tablet 1     Sig: Take 1 tablet by mouth nightly as needed for Itching

## 2025-06-10 NOTE — TELEPHONE ENCOUNTER
Method of communication:  [x]Fax []Phone call []In person   []Other:    Who is making request:[facility]  What medication/s (include strength and dosing):  Hydroxyzine HCL 25 mg tablet  Qty 90    This is for a:   [x]Refill    []New medication request  []Follow up on prior request    Pharmacy:[place] [address] [facility]  Best contact for patient:[phone]    Additional notes:

## 2025-06-12 ENCOUNTER — HOSPITAL ENCOUNTER (OUTPATIENT)
Facility: HOSPITAL | Age: 55
Discharge: HOME | End: 2025-06-12
Attending: ORTHOPAEDIC SURGERY
Payer: MEDICAID

## 2025-06-12 VITALS
DIASTOLIC BLOOD PRESSURE: 81 MMHG | TEMPERATURE: 97.9 F | SYSTOLIC BLOOD PRESSURE: 135 MMHG | HEART RATE: 87 BPM | RESPIRATION RATE: 18 BRPM

## 2025-06-12 DIAGNOSIS — S91.101D OPEN WOUND OF RIGHT GREAT TOE, SUBSEQUENT ENCOUNTER: Primary | ICD-10-CM

## 2025-06-12 PROCEDURE — 11042 DBRDMT SUBQ TIS 1ST 20SQCM/<: CPT

## 2025-06-12 RX ORDER — SODIUM CHLOR/HYPOCHLOROUS ACID 0.033 %
SOLUTION, IRRIGATION IRRIGATION PRN
OUTPATIENT
Start: 2025-06-12

## 2025-06-12 RX ORDER — SILVER SULFADIAZINE 10 MG/G
CREAM TOPICAL PRN
OUTPATIENT
Start: 2025-06-12

## 2025-06-12 RX ORDER — LIDOCAINE HYDROCHLORIDE 20 MG/ML
JELLY TOPICAL PRN
OUTPATIENT
Start: 2025-06-12

## 2025-06-12 RX ORDER — GINSENG 100 MG
CAPSULE ORAL PRN
OUTPATIENT
Start: 2025-06-12

## 2025-06-12 RX ORDER — LIDOCAINE HYDROCHLORIDE 40 MG/ML
SOLUTION TOPICAL PRN
OUTPATIENT
Start: 2025-06-12

## 2025-06-12 RX ORDER — BETAMETHASONE DIPROPIONATE 0.5 MG/G
CREAM TOPICAL PRN
OUTPATIENT
Start: 2025-06-12

## 2025-06-12 RX ORDER — GENTAMICIN SULFATE 1 MG/G
OINTMENT TOPICAL PRN
OUTPATIENT
Start: 2025-06-12

## 2025-06-12 RX ORDER — BACITRACIN ZINC AND POLYMYXIN B SULFATE 500; 1000 [USP'U]/G; [USP'U]/G
OINTMENT TOPICAL PRN
OUTPATIENT
Start: 2025-06-12

## 2025-06-12 RX ORDER — LIDOCAINE 50 MG/G
OINTMENT TOPICAL PRN
OUTPATIENT
Start: 2025-06-12

## 2025-06-12 RX ORDER — NEOMYCIN/BACITRACIN/POLYMYXINB 3.5-400-5K
OINTMENT (GRAM) TOPICAL PRN
OUTPATIENT
Start: 2025-06-12

## 2025-06-12 RX ORDER — MUPIROCIN 2 %
OINTMENT (GRAM) TOPICAL PRN
OUTPATIENT
Start: 2025-06-12

## 2025-06-12 RX ORDER — CLOBETASOL PROPIONATE 0.5 MG/G
OINTMENT TOPICAL PRN
OUTPATIENT
Start: 2025-06-12

## 2025-06-12 RX ORDER — TRIAMCINOLONE ACETONIDE 1 MG/G
OINTMENT TOPICAL PRN
OUTPATIENT
Start: 2025-06-12

## 2025-06-12 RX ORDER — LIDOCAINE 40 MG/G
CREAM TOPICAL PRN
OUTPATIENT
Start: 2025-06-12

## 2025-06-12 ASSESSMENT — PAIN SCALES - GENERAL: PAINLEVEL_OUTOF10: 9

## 2025-06-12 ASSESSMENT — PAIN DESCRIPTION - LOCATION: LOCATION: FOOT

## 2025-06-12 ASSESSMENT — PAIN DESCRIPTION - ORIENTATION: ORIENTATION: RIGHT

## 2025-06-12 NOTE — WOUND CARE
[facility]   Wound Care and Hyperbaric Oxygen Therapy Center   Medical Staff Progress Note     Sesar Echavarria  MEDICAL RECORD NUMBER:  [id number]  AGE: 54 y.o.   GENDER: female  : 1970  EPISODE DATE:  2025    Chief complaint and reason for visit:     Chief Complaint   Patient presents with    Wound Check     R foot            HISTORY of PRESENT ILLNESS HPI     Sesar Echavarria is a 54 y.o. female who presents today for wound/ulcer evaluation.  Patient is doing well, sugars still out of control, wound is stable over the right foot dorsum, she has got superficial blistering on the other foot also,        Objective:    /81   Pulse 87   Temp 97.9 °F (36.6 °C) (Temporal)   Resp 18   Wt Readings from Last 3 Encounters:   25 97.5 kg (215 lb)   25 97.5 kg (215 lb)   25 97.5 kg (215 lb)       Using: curette the wound(s)/ulcer(s) was/were debrided down through and including the removal of subcutaneous tissue.  Performed by: Ashutosh Moreno MD  Consent obtained: yes  Time out taken: yes  Pain Control: Anesthetic  Anesthetic: 4% Lidocaine Liquid Topical       Devitalized Tissue Debrided: biofilm    Pre Debridement Measurements:  Are located in the Wound/Ulcer Documentation Flow Sheet    Diabetic/Pressure/Non Pressure Ulcers only:  Ulcer: Diabetic ulcer, muscle necrosis     Wound/Ulcer #: 1  Post Debridement Measurements:  Wound/Ulcer Descriptions are Pre Debridement except measurements:  Wound 25 Foot Right #1 (Active)   Wound Image   25 1045   Wound Etiology Pressure Stage 3 25 1045   Dressing Status Clean;Dry;Intact 25 1045   Wound Cleansed Cleansed with saline 25 1045   Wound Length (cm) 0.7 cm 25 1045   Wound Width (cm) 0.8 cm 25 1045   Wound Depth (cm) 0.3 cm 25 1045   Wound Surface Area (cm^2) 0.56 cm^2 25 1045   Change in Wound Size % (l*w) -14.29 25 1045   Wound Volume (cm^3) 0.168 cm^3 25 1045

## 2025-06-12 NOTE — WOUND CARE
Wound Care Supplies      Supply Company:     [employer] [address] [place] [address] f: [phone] f: [phone] p: [phone] [email or web address]      Ordering Center:     [facility]  [address] [place] [address], [address] [address]    Phone: [phone]  Fax: [phone]    Patient Information:      Sesar Echavarria  [address]  [address]  [facility] [address]   [phone]   : 1970  AGE: 54 y.o.     GENDER: female   EPISODE DATE: 2025    Insurance:      PRIMARY INSURANCE:  Plan: [facility] CCCP HEALTHKEEPERS PLUS  Coverage: [facility] MEDICAID  Effective Date: 2018  Group Number: [unfilled]  Subscriber Number: [id number] - (Medicaid Managed)    Payer/Plan Subscr  Sex Relation Sub. Ins. ID Effective Group Num   1. [facility] MEDIC* SESAR ECHAVARRIA 1970 Female Self [id number] [facility]                                   [address] [address]       Patient Wound Information:      Problem List Items Addressed This Visit          Other    Open wound of right great toe - Primary    Relevant Medications    lidocaine 4 % jelly    Other Relevant Orders    MD COMMUNICATION 1    MD COMMUNICATION 2    Initiate Outpatient Wound Care Protocol    Initiate Oxygen Therapy Protocol       WOUNDS REQUIRING DRESSING SUPPLIES:     Wound 25 Foot Right #1 (Active)   Wound Image   25 1045   Wound Etiology Pressure Stage 3 25 1045   Dressing Status Clean;Dry;Intact 25 1045   Wound Cleansed Cleansed with saline 25 1045   Wound Length (cm) 0.7 cm 25 1045   Wound Width (cm) 0.8 cm 25 1045   Wound Depth (cm) 0.3 cm 25 1045   Wound Surface Area (cm^2) 0.56 cm^2 25 1045   Change in Wound Size % (l*w) -14.29 25 1045   Wound Volume (cm^3) 0.168 cm^3 25 1045   Wound Healing % -71 25 1045   Post-Procedure Length (cm) 0.9 cm 25 1105   Post-Procedure Width (cm) 1 cm  Called Fabiola Otoole. Left VM conveying DARRYN Escalante's recommendations below.    Updated med dose in med list.    Provided clinic phone number for questions/concerns.    Encounter closed.

## 2025-06-12 NOTE — WOUND CARE
Wound Clinic Physician Orders and Discharge Instructions  [facility]  [address], [address]  [place] [address]  Telephone: [phone]     FAX [phone]    NAME:  Sesar Echavarria  YOB: 1970  MEDICAL RECORD NUMBER:  [id number]  DATE:  6/12/2025      Return Appointment:  [x] Dressing Supply Provider: NAS   [] Home Healthcare:  [x] Return Appointment: 1 Week(s)  [] Nurse Visit:      [] Discharge from [facility]: [] Healed        [] Refer to Provider:         [] Consult    Follow-up Information:  [] Ordered Tests:  [] Referrals:   [] Rx:   [] Would Culture:  [] Other:       Wound Cleansing:   Do not scrub or use excessive force.  Cleanse wound prior to applying a clean dressing with:  [x] Normal Saline OR   [x] Cleanse wound with Mild Soap & Water     [] Wound Cleanser   [] Keep Wound Dry in Shower  [] Wear a cast cover to shower  [] Other:       Topical Treatments:  Do not apply lotions, creams, or ointments to wound bed unless directed.   [] Apply moisturizing lotion to skin surrounding the wound prior to dressing change.  [] Apply antifungal ointment to skin surrounding the wound prior to dressing change.  [] Apply thin film of moisture barrier ointment to skin immediately around wound.  [] Apply Betadine to skin immediately around wound   [] Apply Skin Prep to skin immediately around wound  [] Other:      Dressings:           Wound Location RIGHT FOOT    [x] Apply Primary Dressing:       [] MediHoney Gel [] MediHoney Alginate  [] Calcium Alginate with Silver   [] Calcium Alginate without silver   [] Collagen with silver [] Collagen without Silver    [] Santyl with moist saline gauze     [x] Hydrofera Blue (cut to size and moistened with normal saline)  [] Hydrofera Blue Ready (cut to size)      [] Normal Saline wet to dry  [] Betadine wet to dry    [] Hydrogel  [] Mepitel     [] Bactroban/Mupirocin   [] Iodoform Packing Strip [] Plain Packing Strip   [] Skin  DISPLAY PLAN FREE TEXT

## 2025-06-12 NOTE — DISCHARGE INSTRUCTIONS
Megan Osorio RN  Registered Nurse     Wound Care     Signed     Date of Service: 6/12/2025 10:30 AM     Signed                          Wound Clinic Physician Orders and Discharge Instructions  [facility]  [facility] [address]  [place] [id number]  Telephone: [phone]     FAX [phone]     NAME:  Sesar Echavarria  YOB: 1970  MEDICAL RECORD NUMBER:  [id number]  DATE:  6/12/2025        Return Appointment:  [x] Dressing Supply Provider: NAS   [] Home Healthcare:  [x] Return Appointment: 1 Week(s)  [] Nurse Visit:       [] Discharge from [facility]: [] Healed        [] Refer to Provider:         [] Consult     Follow-up Information:  [] Ordered Tests:  [] Referrals:   [] Rx:   [] Would Culture:  [] Other:         Wound Cleansing:   Do not scrub or use excessive force.  Cleanse wound prior to applying a clean dressing with:  [x] Normal Saline OR    [x] Cleanse wound with Mild Soap & Water     [] Wound Cleanser   [] Keep Wound Dry in Shower  [] Wear a cast cover to shower  [] Other:        Topical Treatments:  Do not apply lotions, creams, or ointments to wound bed unless directed.   [] Apply moisturizing lotion to skin surrounding the wound prior to dressing change.  [] Apply antifungal ointment to skin surrounding the wound prior to dressing change.  [] Apply thin film of moisture barrier ointment to skin immediately around wound.  [] Apply Betadine to skin immediately around wound   [] Apply Skin Prep to skin immediately around wound  [] Other:                 Dressings:                  Wound Location RIGHT FOOT         [x] Apply Primary Dressing:                                          [] MediHoney Gel      [] MediHoney Alginate  [] Calcium Alginate with Silver   [] Calcium Alginate without silver              [] Collagen with silver            [] Collagen without Silver    [] Santyl with moist saline gauze                [x] Hydrofera Blue (cut to size

## 2025-06-16 RX ORDER — HYDROXYZINE HYDROCHLORIDE 25 MG/1
25 TABLET, FILM COATED ORAL NIGHTLY PRN
Qty: 60 TABLET | Refills: 0 | Status: SHIPPED | OUTPATIENT
Start: 2025-06-16

## 2025-06-19 ENCOUNTER — HOSPITAL ENCOUNTER (OUTPATIENT)
Facility: HOSPITAL | Age: 55
Discharge: HOME | End: 2025-06-19
Attending: ORTHOPAEDIC SURGERY
Payer: MEDICAID

## 2025-06-19 VITALS
SYSTOLIC BLOOD PRESSURE: 132 MMHG | DIASTOLIC BLOOD PRESSURE: 75 MMHG | HEART RATE: 89 BPM | RESPIRATION RATE: 18 BRPM | TEMPERATURE: 97.3 F

## 2025-06-19 DIAGNOSIS — S91.101D OPEN WOUND OF RIGHT GREAT TOE, SUBSEQUENT ENCOUNTER: Primary | ICD-10-CM

## 2025-06-19 PROCEDURE — 11042 DBRDMT SUBQ TIS 1ST 20SQCM/<: CPT

## 2025-06-19 RX ORDER — BETAMETHASONE DIPROPIONATE 0.5 MG/G
CREAM TOPICAL PRN
OUTPATIENT
Start: 2025-06-19

## 2025-06-19 RX ORDER — LIDOCAINE HYDROCHLORIDE 40 MG/ML
SOLUTION TOPICAL PRN
OUTPATIENT
Start: 2025-06-19

## 2025-06-19 RX ORDER — GENTAMICIN SULFATE 1 MG/G
OINTMENT TOPICAL PRN
OUTPATIENT
Start: 2025-06-19

## 2025-06-19 RX ORDER — SILVER SULFADIAZINE 10 MG/G
CREAM TOPICAL PRN
OUTPATIENT
Start: 2025-06-19

## 2025-06-19 RX ORDER — SODIUM CHLOR/HYPOCHLOROUS ACID 0.033 %
SOLUTION, IRRIGATION IRRIGATION PRN
OUTPATIENT
Start: 2025-06-19

## 2025-06-19 RX ORDER — LIDOCAINE 40 MG/G
CREAM TOPICAL PRN
OUTPATIENT
Start: 2025-06-19

## 2025-06-19 RX ORDER — LIDOCAINE 50 MG/G
OINTMENT TOPICAL PRN
OUTPATIENT
Start: 2025-06-19

## 2025-06-19 RX ORDER — TRIAMCINOLONE ACETONIDE 1 MG/G
OINTMENT TOPICAL PRN
OUTPATIENT
Start: 2025-06-19

## 2025-06-19 RX ORDER — GINSENG 100 MG
CAPSULE ORAL PRN
OUTPATIENT
Start: 2025-06-19

## 2025-06-19 RX ORDER — LIDOCAINE HYDROCHLORIDE 20 MG/ML
JELLY TOPICAL PRN
OUTPATIENT
Start: 2025-06-19

## 2025-06-19 RX ORDER — MUPIROCIN 2 %
OINTMENT (GRAM) TOPICAL PRN
OUTPATIENT
Start: 2025-06-19

## 2025-06-19 RX ORDER — CLOBETASOL PROPIONATE 0.5 MG/G
OINTMENT TOPICAL PRN
OUTPATIENT
Start: 2025-06-19

## 2025-06-19 RX ORDER — BACITRACIN ZINC AND POLYMYXIN B SULFATE 500; 1000 [USP'U]/G; [USP'U]/G
OINTMENT TOPICAL PRN
OUTPATIENT
Start: 2025-06-19

## 2025-06-19 RX ORDER — NEOMYCIN/BACITRACIN/POLYMYXINB 3.5-400-5K
OINTMENT (GRAM) TOPICAL PRN
OUTPATIENT
Start: 2025-06-19

## 2025-06-19 ASSESSMENT — PAIN DESCRIPTION - ORIENTATION: ORIENTATION: RIGHT

## 2025-06-19 ASSESSMENT — PAIN SCALES - GENERAL: PAINLEVEL_OUTOF10: 8

## 2025-06-19 ASSESSMENT — PAIN DESCRIPTION - LOCATION: LOCATION: FOOT

## 2025-06-19 ASSESSMENT — PAIN DESCRIPTION - DESCRIPTORS: DESCRIPTORS: ACHING

## 2025-06-19 NOTE — WOUND CARE
Wound Clinic Physician Orders and Discharge Instructions  [facility]  [address]  [place] [address]  Telephone: [phone]     FAX [phone]    NAME:  Sesar Echavarria  YOB: 1970  MEDICAL RECORD NUMBER:  [id number]  DATE:  6/19/2025      Return Appointment:  [] Dressing Supply Provider: NAS   [] Home Healthcare:  [x] Return Appointment: 2 Week(s)  [] Nurse Visit:      [] Discharge from [facility]: [] Healed        [] Refer to Provider:         [] Consult    Follow-up Information:  [] Ordered Tests:  [] Referrals:   [] Rx:   [] Would Culture:  [] Other:       Wound Cleansing:   Do not scrub or use excessive force.  Cleanse wound prior to applying a clean dressing with:  [x] Normal Saline OR   [x] Cleanse wound with Mild Soap & Water     [] Wound Cleanser   [] Keep Wound Dry in Shower  [] Wear a cast cover to shower  [] Other:       Topical Treatments:  Do not apply lotions, creams, or ointments to wound bed unless directed.   [] Apply moisturizing lotion to skin surrounding the wound prior to dressing change.  [] Apply antifungal ointment to skin surrounding the wound prior to dressing change.  [] Apply thin film of moisture barrier ointment to skin immediately around wound.  [] Apply Betadine to skin immediately around wound   [] Apply Skin Prep to skin immediately around wound  [] Other:      Dressings:           Wound Location RIGHT FOOT    [x] Apply Primary Dressing:       [] MediHoney Gel [] MediHoney Alginate  [] Calcium Alginate with Silver   [] Calcium Alginate without silver   [] Collagen with silver [] Collagen without Silver    [] Santyl with moist saline gauze     [x] Hydrofera Blue (cut to size and moistened with normal saline)  [] Hydrofera Blue Ready (cut to size)      [] Normal Saline wet to dry  [] Betadine wet to dry    [] Hydrogel  [] Mepitel     [] Bactroban/Mupirocin   [] Iodoform Packing Strip [] Plain Packing Strip   [] Skin

## 2025-06-19 NOTE — DISCHARGE INSTRUCTIONS
Wound Clinic Physician Orders and Discharge Instructions  [facility]  [address]  [place] [address]  Telephone: [phone]     FAX [phone]    NAME:  Sesar Echavarria  YOB: 1970  MEDICAL RECORD NUMBER:  [id number]  DATE:  6/19/2025      Return Appointment:  [] Dressing Supply Provider: NAS   [] Home Healthcare:  [x] Return Appointment: 2 Week(s)  [] Nurse Visit:      [] Discharge from [facility]: [] Healed        [] Refer to Provider:         [] Consult    Follow-up Information:  [] Ordered Tests:  [] Referrals:   [] Rx:   [] Would Culture:  [] Other:       Wound Cleansing:   Do not scrub or use excessive force.  Cleanse wound prior to applying a clean dressing with:  [x] Normal Saline OR   [x] Cleanse wound with Mild Soap & Water     [] Wound Cleanser   [] Keep Wound Dry in Shower  [] Wear a cast cover to shower  [] Other:       Topical Treatments:  Do not apply lotions, creams, or ointments to wound bed unless directed.   [] Apply moisturizing lotion to skin surrounding the wound prior to dressing change.  [] Apply antifungal ointment to skin surrounding the wound prior to dressing change.  [] Apply thin film of moisture barrier ointment to skin immediately around wound.  [] Apply Betadine to skin immediately around wound   [] Apply Skin Prep to skin immediately around wound  [] Other:      Dressings:           Wound Location RIGHT FOOT    [x] Apply Primary Dressing:       [] MediHoney Gel [] MediHoney Alginate  [] Calcium Alginate with Silver   [] Calcium Alginate without silver   [] Collagen with silver [] Collagen without Silver    [] Santyl with moist saline gauze     [x] Hydrofera Blue (cut to size and moistened with normal saline)  [] Hydrofera Blue Ready (cut to size)      [] Normal Saline wet to dry  [] Betadine wet to dry    [] Hydrogel  [] Mepitel     [] Bactroban/Mupirocin   [] Iodoform Packing Strip [] Plain Packing Strip   [] Skin

## 2025-06-19 NOTE — WOUND CARE
[facility]   Wound Care and Hyperbaric Oxygen Therapy Center   Medical Staff Progress Note     Sesar Echavarria  MEDICAL RECORD NUMBER:  [id number]  AGE: 54 y.o.   GENDER: female  : 1970  EPISODE DATE:  2025    Chief complaint and reason for visit:     Chief Complaint   Patient presents with    Wound Check     Right foot wound             HISTORY of PRESENT ILLNESS HPI     Sesar Echavarria is a 54 y.o. female who presents today for wound/ulcer evaluation.  Patient is doing well, sugar seems to be getting under control,        Objective:    /75   Pulse 89   Temp 97.3 °F (36.3 °C) (Temporal)   Resp 18   Wt Readings from Last 3 Encounters:   25 97.5 kg (215 lb)   25 97.5 kg (215 lb)   25 97.5 kg (215 lb)       Using: #15 surgical blade the wound(s)/ulcer(s) was/were debrided down through and including the removal of subcutaneous tissue.  Performed by: Ashutosh Moreno MD  Consent obtained: yes  Time out taken: yes  Pain Control: Anesthetic  Anesthetic: 4% Lidocaine Liquid Topical       Devitalized Tissue Debrided: slough    Pre Debridement Measurements:  Are located in the Wound/Ulcer Documentation Flow Sheet    Diabetic/Pressure/Non Pressure Ulcers only:  Ulcer: Diabetic ulcer, fat layer exposed     Wound/Ulcer #: 1  Post Debridement Measurements:  Wound/Ulcer Descriptions are Pre Debridement except measurements:  Wound 25 Foot Right #1 (Active)   Wound Image   25 1111   Wound Etiology Pressure Stage 3 25 1111   Dressing Status Clean;Dry;Intact 25 1111   Wound Cleansed Cleansed with saline 25 1111   Dressing/Treatment Hydrofera blue;Gauze dressing/dressing sponge;Foam 25 1213   Wound Length (cm) 0.8 cm 25 1111   Wound Width (cm) 1.1 cm 25 1111   Wound Depth (cm) 0.2 cm 25 1111   Wound Surface Area (cm^2) 0.88 cm^2 25 1111   Change in Wound Size % (l*w) -79.59 25 1111   Wound Volume (cm^3) 0.176

## 2025-06-20 NOTE — WOUND CARE
Voicemail message received from patient in reference to wound care supplies. Patient stated that she had not received any supplies and the DME company did not receive a new order from us. In the voicemail message patient refers to us at the wound clinic as \"incompetent\" and \"lazy\" and stated that \"it would be a shame for me to have to tell Dr. Moreno that the reason I stopped coming is because your staff are lazy and do not do their jobs.\"    Writer spoke with Carlos Alberto from [facility] who confirmed a supply order had been shipped to patient 5/22 and her insurance would allow a new shipment 6/22/25. He saw where we had also placed an order for supplies 6/12 and stated they should be able to ship it by tomorrow. New order placed and faxed to [facility]. Carlos Alberto stated he would call the patient and explain to her how often she can get supplies.

## 2025-06-20 NOTE — WOUND CARE
w    Wound Care Supplies      Supply Company:     [employer] [address] [place] [id number] f: [phone] f: [phone] p: [phone] [email or web address]      Ordering Center:     [facility]  [address] [place] [address], [address] [address]    Phone: [phone]  Fax: [phone]    Patient Information:      Sesar Echavarria  [address], [address]  [address]  [facility] [id number] [phone]   : 1970  AGE: 54 y.o.     GENDER: female   EPISODE DATE: 2025    Insurance:      PRIMARY INSURANCE:  Plan: [facility] CCC HE[employer]KEEPERS PLUS  Coverage: [facility] MEDICAID  Effective Date: 2018  Group Number: [unfilled]  Subscriber Number: [id number] - (Medicaid Managed)    Payer/Plan Subscr  Sex Relation Sub. Ins. ID Effective Group Num   1. [facility] MEDIC* SESAR ECHAVARRIA 1970 Female Self [id number]                                   [address] [address]       Patient Wound Information:      Problem List Items Addressed This Visit          Other    Open wound of right great toe - Primary       WOUNDS REQUIRING DRESSING SUPPLIES:     Wound 25 Foot Right #1 (Active)   Wound Image   25 1111   Wound Etiology Pressure Stage 3 25 1111   Dressing Status Clean;Dry;Intact 25 1111   Wound Cleansed Cleansed with saline 25 1111   Dressing/Treatment Hydrofera blue;Gauze dressing/dressing sponge;Foam 25 1213   Wound Length (cm) 0.8 cm 25 1111   Wound Width (cm) 1.1 cm 25 1111   Wound Depth (cm) 0.2 cm 25 1111   Wound Surface Area (cm^2) 0.88 cm^2 25 1111   Change in Wound Size % (l*w) -79.59 25 1111   Wound Volume (cm^3) 0.176 cm^3 25 1111   Wound Healing % -80 25 1111   Post-Procedure Length (cm) 1 cm 25 1154   Post-Procedure Width (cm) 1.3 cm 25 1154   Post-Procedure Depth (cm) 0.4 cm 25   Post-Procedure Surface Area (cm^2) 1.3 cm^2

## 2025-06-27 ENCOUNTER — TELEPHONE (OUTPATIENT)
Facility: HOSPITAL | Age: 55
End: 2025-06-27

## 2025-06-27 DIAGNOSIS — S91.101D OPEN WOUND OF RIGHT GREAT TOE, SUBSEQUENT ENCOUNTER: Primary | ICD-10-CM

## 2025-06-27 NOTE — TELEPHONE ENCOUNTER
Received call back from patient regarding appointment. Informed her Dr. Moreno will no longer be treating her here at the wound clinic and at his ortho office. Offered her to see another provider. Patient stated she had been to [facility] in the past and had a good experience there and she would just return there. Patient asked why Dr. Moreno could no longer see her and I explained to her that we had received her voicemail regarding a supply issue and her complaints and inappropriate remarks of the staff in the office and Dr. Moreno had been notified of the concerns. She stated she was only repeating what she was told by the DME company [employer].     Called to notify [facility] office as patient has gone there for primary care services in the past, and was informed that the provider the patient was seeing is no longer there and the patient will not be following up there anymore.

## 2025-06-27 NOTE — TELEPHONE ENCOUNTER
Left message to reschedule appointment with Dr. Moreno and switch patient to a new provider as Dr. Moreno stated he would no longer be able to see the patient due to voicemail she left at the clinic calling the staff \"lazy and incompetent.\"

## 2025-07-09 ENCOUNTER — OFFICE VISIT (OUTPATIENT)
Facility: CLINIC | Age: 55
End: 2025-07-09
Payer: MEDICAID

## 2025-07-09 VITALS
TEMPERATURE: 98.9 F | SYSTOLIC BLOOD PRESSURE: 136 MMHG | DIASTOLIC BLOOD PRESSURE: 84 MMHG | BODY MASS INDEX: 37.39 KG/M2 | WEIGHT: 219 LBS | OXYGEN SATURATION: 98 % | HEART RATE: 96 BPM | RESPIRATION RATE: 16 BRPM | HEIGHT: 64 IN

## 2025-07-09 DIAGNOSIS — E66.812 CLASS 2 SEVERE OBESITY DUE TO EXCESS CALORIES WITH SERIOUS COMORBIDITY AND BODY MASS INDEX (BMI) OF 37.0 TO 37.9 IN ADULT (HCC): ICD-10-CM

## 2025-07-09 DIAGNOSIS — E11.65 TYPE 2 DIABETES MELLITUS WITH HYPERGLYCEMIA, WITH LONG-TERM CURRENT USE OF INSULIN (HCC): ICD-10-CM

## 2025-07-09 DIAGNOSIS — Z12.11 SCREENING FOR COLON CANCER: ICD-10-CM

## 2025-07-09 DIAGNOSIS — R26.89 BALANCE PROBLEM: ICD-10-CM

## 2025-07-09 DIAGNOSIS — D50.8 OTHER IRON DEFICIENCY ANEMIA: ICD-10-CM

## 2025-07-09 DIAGNOSIS — M14.671 CHARCOT'S JOINT OF RIGHT FOOT: ICD-10-CM

## 2025-07-09 DIAGNOSIS — E78.2 MIXED HYPERLIPIDEMIA: ICD-10-CM

## 2025-07-09 DIAGNOSIS — Z91.81 RISK FOR FALLS: ICD-10-CM

## 2025-07-09 DIAGNOSIS — E11.610 DIABETIC CHARCOT FOOT (HCC): ICD-10-CM

## 2025-07-09 DIAGNOSIS — R26.9 ABNORMALITY OF GAIT: ICD-10-CM

## 2025-07-09 DIAGNOSIS — S91.101S OPEN WOUND OF RIGHT GREAT TOE, SEQUELA: ICD-10-CM

## 2025-07-09 DIAGNOSIS — I10 ESSENTIAL HYPERTENSION: ICD-10-CM

## 2025-07-09 DIAGNOSIS — R29.6 FREQUENT FALLS: ICD-10-CM

## 2025-07-09 DIAGNOSIS — Z79.4 TYPE 2 DIABETES MELLITUS WITH HYPERGLYCEMIA, WITH LONG-TERM CURRENT USE OF INSULIN (HCC): ICD-10-CM

## 2025-07-09 DIAGNOSIS — E66.01 CLASS 2 SEVERE OBESITY DUE TO EXCESS CALORIES WITH SERIOUS COMORBIDITY AND BODY MASS INDEX (BMI) OF 37.0 TO 37.9 IN ADULT (HCC): ICD-10-CM

## 2025-07-09 DIAGNOSIS — Z00.01 ENCOUNTER FOR GENERAL ADULT MEDICAL EXAMINATION WITH ABNORMAL FINDINGS: Primary | ICD-10-CM

## 2025-07-09 DIAGNOSIS — L60.8 NAIL DISCOLORATION: ICD-10-CM

## 2025-07-09 DIAGNOSIS — R22.2 SUBCUTANEOUS MASS OF BACK: ICD-10-CM

## 2025-07-09 PROCEDURE — 3079F DIAST BP 80-89 MM HG: CPT | Performed by: PHYSICIAN ASSISTANT

## 2025-07-09 PROCEDURE — 3075F SYST BP GE 130 - 139MM HG: CPT | Performed by: PHYSICIAN ASSISTANT

## 2025-07-09 PROCEDURE — 3052F HG A1C>EQUAL 8.0%<EQUAL 9.0%: CPT | Performed by: PHYSICIAN ASSISTANT

## 2025-07-09 PROCEDURE — 99214 OFFICE O/P EST MOD 30 MIN: CPT | Performed by: PHYSICIAN ASSISTANT

## 2025-07-09 PROCEDURE — 99396 PREV VISIT EST AGE 40-64: CPT | Performed by: PHYSICIAN ASSISTANT

## 2025-07-09 SDOH — HEALTH STABILITY: PHYSICAL HEALTH: ON AVERAGE, HOW MANY DAYS PER WEEK DO YOU ENGAGE IN MODERATE TO STRENUOUS EXERCISE (LIKE A BRISK WALK)?: 7 DAYS

## 2025-07-09 SDOH — HEALTH STABILITY: PHYSICAL HEALTH: ON AVERAGE, HOW MANY MINUTES DO YOU ENGAGE IN EXERCISE AT THIS LEVEL?: 60 MIN

## 2025-07-09 ASSESSMENT — ENCOUNTER SYMPTOMS
VOMITING: 0
RHINORRHEA: 0
NAUSEA: 0
DIARRHEA: 0
SHORTNESS OF BREATH: 0
SORE THROAT: 0
COUGH: 0

## 2025-07-09 NOTE — PROGRESS NOTES
Sesar Echavarria is a 54 y.o. female    Identified pt with two pt identifiers(name and ). Reviewed record in preparation for visit and have obtained necessary documentation. All patient medications has been reviewed.    Chief Complaint   Patient presents with    New Patient    Establish Care    Referral - General     Physical Therapy    Nail Problem    Mass     Bump on right side of back that is getting bigger       Wt Readings from Last 3 Encounters:   25 99.3 kg (219 lb)   25 97.5 kg (215 lb)   25 97.5 kg (215 lb)     Temp Readings from Last 3 Encounters:   25 97.3 °F (36.3 °C) (Temporal)   25 97.9 °F (36.6 °C) (Temporal)     BP Readings from Last 3 Encounters:   25 132/75   25 135/81   25 136/69     Pulse Readings from Last 3 Encounters:   25 89   25 87   25 85       Have you been to the ER, urgent care clinic since your last visit?  Hospitalized since your last visit?   NO    Have you seen or consulted any other health care providers outside our system since your last visit?   NO     “Have you had a pap smear?”    NO    No cervical cancer screening on file       “Have you had a colorectal cancer screening such as a colonoscopy/FIT/Cologuard?    NO    No colonoscopy on file  No cologuard on file  No FIT/FOBT on file   No flexible sigmoidoscopy on file     “Have you had a diabetic eye exam?”    Yes     Date of last diabetic eye exam: 2023           This patient is accompanied in the office by her child. Verified via verbal consent with Sesar Echavarria that they were allowed in the room during the visit today and can hear any/all medical information that may be discussed.  
Future  -     Ferritin; Future  -     CBC with Auto Differential; Future  -     Comprehensive Metabolic Panel; Future  12. Balance problem  -     External Referral To Physical Therapy  13. Abnormality of gait  -     External Referral To Physical Therapy  14. Screening for colon cancer  -     Amb External Referral To Gastroenterology  15. Class 2 severe obesity due to excess calories with serious comorbidity and body mass index (BMI) of 37.0 to 37.9 in adult (HCC)  -     TSH; Future  16. Risk for falls     Order for lab work, will follow-up with results.  Referral to GI for screening colonoscopy.  Order for physical therapy.  Keep follow-up with endocrinology as scheduled.  Order for ultrasound of lump.  Further workup after ultrasound results.  Follow-up in 6 months or earlier as scheduled.    Return in about 6 months (around 1/9/2026) for Cholesterol, HTN, Diabetes, Follow up.     Call 911 or go to ER if shortness of breath,Chest pain or worsening of symptoms.    Diagnosis and plan discussed with patient who verbillized understanding.    Addend: 07/10/25, patient is called 11.05 a.m, informed to  lab orders, PT orders and ultrasound order from clinic.  Understands.      Please note that this dictation may have been completed with Dragon, the [employer] voice recognition software.  Quite often unanticipated grammatical, syntax, homophones, and other interpretive errors are inadvertently transcribed by the computer software.  Please disregard these errors.  Please excuse any errors that have escaped final proofreading.

## 2025-07-10 PROBLEM — Z91.81 RISK FOR FALLS: Status: ACTIVE | Noted: 2025-07-10

## 2025-07-10 RX ORDER — DULAGLUTIDE 4.5 MG/.5ML
INJECTION, SOLUTION SUBCUTANEOUS
Qty: 2 ML | Refills: 11 | Status: ACTIVE | OUTPATIENT
Start: 2025-07-10

## 2025-07-10 RX ORDER — INSULIN LISPRO 100 [IU]/ML
INJECTION, SOLUTION INTRAVENOUS; SUBCUTANEOUS
Qty: 15 ML | Refills: 11 | Status: SHIPPED | OUTPATIENT
Start: 2025-07-10

## 2025-07-16 DIAGNOSIS — G47.00 INSOMNIA, UNSPECIFIED TYPE: ICD-10-CM

## 2025-07-16 NOTE — TELEPHONE ENCOUNTER
Requested Prescriptions     Pending Prescriptions Disp Refills    hydrOXYzine HCl (ATARAX) 25 MG tablet [Pharmacy Med Name: HYDROXYZINE HCL 25 MG TABLET] 90 tablet 1     Sig: TAKE 1 TABLET BY MOUTH NIGHTLY AS NEEDED FOR ANXIETY OR ITCHING

## 2025-07-18 RX ORDER — HYDROXYZINE HYDROCHLORIDE 25 MG/1
25 TABLET, FILM COATED ORAL NIGHTLY PRN
Qty: 90 TABLET | Refills: 1 | Status: SHIPPED | OUTPATIENT
Start: 2025-07-18

## 2025-07-24 ENCOUNTER — TELEPHONE (OUTPATIENT)
Facility: CLINIC | Age: 55
End: 2025-07-24

## 2025-07-24 NOTE — TELEPHONE ENCOUNTER
Spoke with [facility] Scheduling and the order CHELSEA Peck put in was for the biopsy itself and not the ultrasound he was looking for. Was able to track down the CPT code with Scheduling and provided it to CHELSEA Peck to change code.

## 2025-07-25 ENCOUNTER — TELEPHONE (OUTPATIENT)
Facility: CLINIC | Age: 55
End: 2025-07-25

## 2025-07-26 LAB
FERRITIN SERPL-MCNC: 179 NG/ML (ref 15–150)
IRON SATN MFR SERPL: 18 % (ref 15–55)
IRON SERPL-MCNC: 48 UG/DL (ref 27–159)
TIBC SERPL-MCNC: 270 UG/DL (ref 250–450)
UIBC SERPL-MCNC: 222 UG/DL (ref 131–425)

## 2025-08-06 ENCOUNTER — TELEPHONE (OUTPATIENT)
Facility: CLINIC | Age: 55
End: 2025-08-06

## 2025-08-06 DIAGNOSIS — R22.2 SUBCUTANEOUS MASS OF BACK: Primary | ICD-10-CM

## 2025-08-14 ENCOUNTER — LAB (OUTPATIENT)
Age: 55
End: 2025-08-14

## 2025-08-14 DIAGNOSIS — E11.65 TYPE 2 DIABETES MELLITUS WITH HYPERGLYCEMIA, WITH LONG-TERM CURRENT USE OF INSULIN (HCC): ICD-10-CM

## 2025-08-14 DIAGNOSIS — I10 ESSENTIAL HYPERTENSION: ICD-10-CM

## 2025-08-14 DIAGNOSIS — E78.2 MIXED HYPERLIPIDEMIA: ICD-10-CM

## 2025-08-14 DIAGNOSIS — Z79.4 TYPE 2 DIABETES MELLITUS WITH HYPERGLYCEMIA, WITH LONG-TERM CURRENT USE OF INSULIN (HCC): ICD-10-CM

## 2025-08-15 LAB
BUN SERPL-MCNC: 15 MG/DL (ref 6–24)
BUN/CREAT SERPL: 17 (ref 9–23)
CALCIUM SERPL-MCNC: 9.4 MG/DL (ref 8.7–10.2)
CHLORIDE SERPL-SCNC: 100 MMOL/L (ref 96–106)
CO2 SERPL-SCNC: 19 MMOL/L (ref 20–29)
CREAT SERPL-MCNC: 0.9 MG/DL (ref 0.57–1)
EGFRCR SERPLBLD CKD-EPI 2021: 75 ML/MIN/1.73
EST. AVERAGE GLUCOSE BLD GHB EST-MCNC: 183 MG/DL
GLUCOSE SERPL-MCNC: 171 MG/DL (ref 70–99)
HBA1C MFR BLD: 8 % (ref 4.8–5.6)
LDLC SERPL DIRECT ASSAY-MCNC: 131 MG/DL (ref 0–99)
POTASSIUM SERPL-SCNC: 4.8 MMOL/L (ref 3.5–5.2)
SODIUM SERPL-SCNC: 136 MMOL/L (ref 134–144)

## 2025-08-16 LAB
ALBUMIN/CREAT UR: 312 MG/G CREAT (ref 0–29)
CREAT UR-MCNC: 18.9 MG/DL
MICROALBUMIN UR-MCNC: 58.9 UG/ML

## 2025-08-21 ENCOUNTER — OFFICE VISIT (OUTPATIENT)
Age: 55
End: 2025-08-21
Payer: MEDICAID

## 2025-08-21 VITALS
TEMPERATURE: 98.9 F | SYSTOLIC BLOOD PRESSURE: 132 MMHG | DIASTOLIC BLOOD PRESSURE: 61 MMHG | HEART RATE: 86 BPM | BODY MASS INDEX: 38.82 KG/M2 | WEIGHT: 227.4 LBS | OXYGEN SATURATION: 100 % | HEIGHT: 64 IN

## 2025-08-21 DIAGNOSIS — I10 ESSENTIAL HYPERTENSION: ICD-10-CM

## 2025-08-21 DIAGNOSIS — E78.2 MIXED HYPERLIPIDEMIA: ICD-10-CM

## 2025-08-21 DIAGNOSIS — E11.65 TYPE 2 DIABETES MELLITUS WITH HYPERGLYCEMIA, WITH LONG-TERM CURRENT USE OF INSULIN (HCC): Primary | ICD-10-CM

## 2025-08-21 DIAGNOSIS — Z79.4 TYPE 2 DIABETES MELLITUS WITH HYPERGLYCEMIA, WITH LONG-TERM CURRENT USE OF INSULIN (HCC): Primary | ICD-10-CM

## 2025-08-21 PROCEDURE — 99214 OFFICE O/P EST MOD 30 MIN: CPT | Performed by: INTERNAL MEDICINE

## 2025-08-21 PROCEDURE — 3075F SYST BP GE 130 - 139MM HG: CPT | Performed by: INTERNAL MEDICINE

## 2025-08-21 PROCEDURE — 3052F HG A1C>EQUAL 8.0%<EQUAL 9.0%: CPT | Performed by: INTERNAL MEDICINE

## 2025-08-21 PROCEDURE — 3078F DIAST BP <80 MM HG: CPT | Performed by: INTERNAL MEDICINE

## 2025-08-27 ENCOUNTER — COMMUNITY OUTREACH (OUTPATIENT)
Facility: CLINIC | Age: 55
End: 2025-08-27

## (undated) DEVICE — TIP SUCTION FLANGE YANKAUER FLX NO VENT FN CAP STRL

## (undated) DEVICE — SUTURE ABSORBABLE BRAIDED 0 CP-1 27 IN COAT UD VICRYL + VCP267H

## (undated) DEVICE — PAD,ABDOMINAL,8"X7.5",STERILE,LF,1/PK: Brand: MEDLINE

## (undated) DEVICE — T-DRAPE,EXTREMITY,STERILE: Brand: MEDLINE

## (undated) DEVICE — INTENDED FOR TISSUE SEPARATION, AND OTHER PROCEDURES THAT REQUIRE A SHARP SURGICAL BLADE TO PUNCTURE OR CUT.: Brand: BARD-PARKER SAFETY BLADES SIZE 15, STERILE

## (undated) DEVICE — STAPLER SKIN H3.9MM WIRE DIA0.58MM CRWN 6.9MM 35 STPL FIX

## (undated) DEVICE — C-ARMOR C-ARM EQUIPMENT COVERS CLEAR STERILE UNIVERSAL FIT 12 PER CASE: Brand: C-ARMOR

## (undated) DEVICE — PREP PAD BNS: Brand: CONVERTORS

## (undated) DEVICE — SUTURE NONABSORBABLE MONOFILAMENT 2-0 FS 18 IN ETHILON 664H

## (undated) DEVICE — BURR STRAIGHT 19.5MM DIAM 2.0 MM

## (undated) DEVICE — DRAPE EQUIP C ARM 74X42 IN MOB XR W/ TIE RUBBER BND LF

## (undated) DEVICE — GLOVE SURG SZ 8 L12IN FNGR THK79MIL GRN LTX FREE

## (undated) DEVICE — CORD BPLR 12FT SGL USE CLR

## (undated) DEVICE — UPPER EXTREMITY: Brand: MEDLINE INDUSTRIES, INC.

## (undated) DEVICE — GAUZE,SPONGE,4"X4",16PLY,STRL,LF,10/TRAY: Brand: MEDLINE

## (undated) DEVICE — BASIC SINGLE BASIN-LF: Brand: MEDLINE INDUSTRIES, INC.

## (undated) DEVICE — BANDAGE COBAN 4 IN COMPR W4INXL5YD FOAM COHESIVE QUIK STK SELF ADH SFT

## (undated) DEVICE — GARMENT,MEDLINE,DVT,INT,CALF,MED, GEN2: Brand: MEDLINE

## (undated) DEVICE — SUTURE VICRYL + SZ 2-0 L27IN ABSRB UD CP-1 1/2 CIR REV CUT VCP266H

## (undated) DEVICE — PADDING CAST W6INXL4YD ST COT RAYON MICROPLEATED HIGHLY

## (undated) DEVICE — 3M™ COBAN™ NL STERILE NON-LATEX SELF-ADHERENT WRAP, 2084S, 4 IN X 5 YD (10 CM X 4,5 M), 18 ROLLS/CASE: Brand: 3M™ COBAN™

## (undated) DEVICE — GLOVE ORTHO 8   MSG9480

## (undated) DEVICE — TUBING, SUCTION, 1/4" X 12', STRAIGHT: Brand: MEDLINE

## (undated) DEVICE — INTENT TO BE USED WITH SUTURE MATERIAL FOR TISSUE CLOSURE: Brand: RICHARD-ALLAN® NEEDLE 3/8 CIRCLE TROCAR

## (undated) DEVICE — ZIMMER® STERILE DISPOSABLE TOURNIQUET CUFF WITH PLC, DUAL PORT, SINGLE BLADDER, 34 IN. (86 CM)

## (undated) DEVICE — SUTURE VICRYL SZ 0 L27IN ABSRB UD L36MM CP-1 1/2 CIR REV CUT J267H

## (undated) DEVICE — PADDING CAST W4INXL4YD ST COT RAYON MICROPLEATED HIGHLY

## (undated) DEVICE — SUTURE MONOCRYL SZ 2-0 L36IN ABSRB UD L36MM CT-1 1/2 CIR Y945H

## (undated) DEVICE — PENCIL ES CRD L10FT HND SWCHING ROCK SWCH W/ EDGE COAT BLDE